# Patient Record
Sex: FEMALE | Race: BLACK OR AFRICAN AMERICAN
[De-identification: names, ages, dates, MRNs, and addresses within clinical notes are randomized per-mention and may not be internally consistent; named-entity substitution may affect disease eponyms.]

---

## 2017-11-11 NOTE — ER DOCUMENT REPORT
HPI





- HPI


Patient complains to provider of: foot ulcer


Onset: Other - Chronic, worse over 2 days


Onset/Duration: Worse


Quality of pain: Achy


Pain Level: 2


Context: 





Patient states that she has a chronic wound to her left foot that started as a 

blister one year ago and then developed into a callus.  Patient states she cut 

the callus off 2 days ago after he started to lift up.  Patient states she has 

noticed malodorous drainage from her foot.  Patient denies any fever or 

significant foot tenderness.  Patient does have a history of high blood 

pressure diabetes although has not been on any medication for the past 3 months 

as she had temporarily lost her insurance.  Patient does have insurance now and 

has an appointment with her primary doctor in 2 weeks for recheck.


Associated Symptoms: Other - Foot ulcer.  denies: Fever


Exacerbated by: Denies


Relieved by: Denies


Similar symptoms previously: Yes


Recently seen / treated by doctor: No





- ROS


ROS below otherwise negative: Yes


Systems Reviewed and Negative: Yes All other systems reviewed and negative





- CONSTITUTIONAL


Constitutional: DENIES: Fever, Chills





- NEURO


Neurology: DENIES: Headache





- CARDIOVASCULAR


Cardiovascular: DENIES: Chest pain





- RESPIRATORY


Respiratory: DENIES: Trouble Breathing, Coughing





- GASTROINTESTINAL


Gastrointestinal: DENIES: Nausea





- REPRODUCTIVE


Reproductive: DENIES: Pregnant:





- MUSCULOSKELETAL


Musculoskeletal: DENIES: Swelling





- DERM


Skin Color: Normal


Skin Problems: Ulcer





Past Medical History





- General


Information source: Patient





- Social History


Smoking Status: Current Every Day Smoker


Smoking Education Provided: Yes


Drug Abuse: None


Occupation: Assembly


Lives with: Family


Family History: Reviewed & Not Pertinent, Hypertension


Patient has suicidal ideation: No


Patient has homicidal ideation: No





- Past Medical History


Cardiac Medical History: Reports: Hx Hypertension


Endocrine Medical History: Reports: Hx Diabetes Mellitus Type 2


Renal/ Medical History: Denies: Hx Peritoneal Dialysis


Surgical Hx: Negative





- Immunizations


Hx Diphtheria, Pertussis, Tetanus Vaccination: No





Vertical Provider Document





- CONSTITUTIONAL


Agree With Documented VS: Yes


Exam Limitations: No Limitations


General Appearance: WD/WN, No Apparent Distress, Obese





- INFECTION CONTROL


TRAVEL OUTSIDE OF THE [place] IN LAST 30 DAYS: No





- HEENT


HEENT: Atraumatic, Normocephalic





- NECK


Neck: Normal Inspection, Supple





- RESPIRATORY


Respiratory: Breath Sounds Normal, No Respiratory Distress


O2 Sat by Pulse Oximetry: 99





- CARDIOVASCULAR


Cardiovascular: Regular Rate, Regular Rhythm


Pulses: Normal: Dorsalis pedis





- BACK


Back: Normal Inspection





- MUSCULOSKELETAL/EXTREMETIES


Musculoskeletal/Extremeties: SARAH, FROM





- NEURO


Level of Consciousness: Awake, Alert, Appropriate


Motor/Sensory: No Motor Deficit





- DERM


Integumentary: Warm, Dry.  negative: Abscess


Notes: 





Patient with chronic foot ulcer to plantar surface of left foot.  Wound 

nontender.  No surrounding erythema.  No overt odor appreciated.  Wound with a 

sloughy yellow-brown appearance





Course





- Re-evaluation


Re-evalutation: 





11/11/17 04:15


Patient states that she has previously taken glipizide to manage her diabetes 

but ran out a few months ago.  Patient is uncertain of the blood pressure 

medication but does suspect that she has taken lisinopril to treat her blood 

pressure.





Discussed results of patient's diagnostic tests with her.  Patient encouraged 

to follow-up with primary doctor for recheck of her foot wound as well as her 

high blood pressure and diabetes management.  Patient encouraged to follow-up 

with the wound clinic for further management of her chronic foot ulcer.  

Patient without any signs concerning for osteomyelitis or cellulitis at this 

time.  Will place patient on antibiotic to cover given her reported history of 

malodorous drainage.





- Vital Signs


Vital signs: 


 











Temp Pulse Resp BP Pulse Ox


 


 98.9 F   85   16   182/119 H  99 


 


 11/11/17 00:58  11/11/17 00:58  11/11/17 02:20  11/11/17 00:58  11/11/17 02:20














- Laboratory


Result Diagrams: 


 11/11/17 01:57





 11/11/17 01:57


Laboratory results interpreted by me: 





11/11/17 06:02


 Labs- Entire Visit











  11/11/17 11/11/17





  01:57 01:57


 


WBC  5.9 


 


RBC  5.21 


 


Hgb  14.1 


 


Hct  41.9 


 


MCV  80 


 


MCH  26.9 L 


 


MCHC  33.5 


 


RDW  14.3 H 


 


Plt Count  198 


 


Seg Neutrophils %  46.8 


 


Lymphocytes %  45.3 H 


 


Monocytes %  5.7 


 


Eosinophils %  1.7 


 


Basophils %  0.5 


 


Absolute Neutrophils  2.7 


 


Absolute Lymphocytes  2.7 


 


Absolute Monocytes  0.3 


 


Absolute Eosinophils  0.1 


 


Absolute Basophils  0.0 


 


Sodium   139.8


 


Potassium   3.6


 


Chloride   102


 


Carbon Dioxide   25


 


Anion Gap   13


 


BUN   14


 


Creatinine   0.57


 


Est GFR ( Amer)   > 60


 


Est GFR (Non-Af Amer)   > 60


 


Glucose   251 H


 


Calcium   8.9


 


Total Bilirubin   0.6


 


Direct Bilirubin   0.3


 


Indirect Bilirubin   Not Reportable


 


Neonat Total Bilirubin   Not Reportable


 


AST   29


 


ALT   20


 


Alkaline Phosphatase   79


 


Total Protein   7.7


 


Albumin   3.8














- Diagnostic Test


Radiology reviewed: Reports reviewed





Discharge





- Discharge


Clinical Impression: 


 Hx of essential hypertension





Diabetic foot ulcer


Qualifiers:


 Diabetic foot ulcer location: unspecified part of foot Diabetes mellitus type: 

type 2 Laterality: left Non-pressure ulcer stage: unspecified non-pressure 

ulcer stage Qualified Code(s): E11.621 - Type 2 diabetes mellitus with foot 

ulcer





Condition: Stable


Disposition: HOME, SELF-CARE


Instructions:  Diabetes ([facility]), Foot or Leg Ulcer ([facility]), High Blood Pressure, 

Requiring Treatment ([facility])


Additional Instructions: 


Return immediately for any new or worsening symptoms





Followup with your primary care provider, call tomorrow to make a followup 

appointment





Follow-up with the wound clinic for further management of diabetic foot ulcer





Keep a log of your blood pressure as well as your blood sugar readings to take 

to your appointment with your primary doctor in 2 weeks.





Monitor your blood sugar daily


Prescriptions: 


Ciprofloxacin HCl [Cipro 500 mg Tablet] 500 mg PO BID #14 tablet


Glipizide [Glipizide ER] 2.5 mg PO DAILY #15 tab.er.[id number]


Lisinopril 10 mg PO DAILY #15 tablet


Forms:  Elevated Blood Pressure


Referrals: 


ERIKA CHILD MD [Primary Care Provider] - Follow up in 3-5 days


[facility] [Provider Group] - 11/13/17

## 2017-11-11 NOTE — RADIOLOGY REPORT (SQ)
EXAM DESCRIPTION:  FOOT LEFT COMPLETE



COMPLETED DATE/TIME:  11/11/2017 3:07 am



REASON FOR STUDY:  foot wound, hx DM



COMPARISON:  None.



NUMBER OF VIEWS:  Three views.



TECHNIQUE:  AP, lateral and oblique  radiographic images acquired of the left foot.



LIMITATIONS:  None.



FINDINGS:  MINERALIZATION: Normal.

BONES: No acute fracture or dislocation.  No worrisome bone lesions.  Small calcaneal enthesophytes. 
 Minimal osteophyte of the navicular at the talonavicular joint.

JOINTS: No effusions.

SOFT TISSUES: No soft tissue swelling.  No foreign body.

OTHER: No other significant finding.



IMPRESSION:  No acute findings.



TECHNICAL DOCUMENTATION:  JOB ID:  [id number]

 2011 [employer]- All Rights Reserved

## 2019-02-27 ENCOUNTER — HOSPITAL ENCOUNTER (EMERGENCY)
Dept: HOSPITAL 62 - ER | Age: 34
Discharge: HOME | End: 2019-02-27
Payer: COMMERCIAL

## 2019-02-27 VITALS — DIASTOLIC BLOOD PRESSURE: 105 MMHG | SYSTOLIC BLOOD PRESSURE: 156 MMHG

## 2019-02-27 DIAGNOSIS — O16.1: ICD-10-CM

## 2019-02-27 DIAGNOSIS — E11.9: ICD-10-CM

## 2019-02-27 DIAGNOSIS — O99.281: Primary | ICD-10-CM

## 2019-02-27 DIAGNOSIS — R42: ICD-10-CM

## 2019-02-27 DIAGNOSIS — Z79.84: ICD-10-CM

## 2019-02-27 DIAGNOSIS — E86.0: ICD-10-CM

## 2019-02-27 DIAGNOSIS — O24.111: ICD-10-CM

## 2019-02-27 DIAGNOSIS — R11.0: ICD-10-CM

## 2019-02-27 DIAGNOSIS — Z3A.01: ICD-10-CM

## 2019-02-27 DIAGNOSIS — O26.891: ICD-10-CM

## 2019-02-27 LAB
ADD MANUAL DIFF: NO
ALBUMIN SERPL-MCNC: 3.9 G/DL (ref 3.5–5)
ALP SERPL-CCNC: 65 U/L (ref 38–126)
ALT SERPL-CCNC: 16 U/L (ref 9–52)
ANION GAP SERPL CALC-SCNC: 11 MMOL/L (ref 5–19)
APPEARANCE UR: CLEAR
APTT PPP: YELLOW S
AST SERPL-CCNC: 13 U/L (ref 14–36)
BASOPHILS # BLD AUTO: 0.1 10^3/UL (ref 0–0.2)
BASOPHILS NFR BLD AUTO: 1 % (ref 0–2)
BILIRUB DIRECT SERPL-MCNC: 0.2 MG/DL (ref 0–0.4)
BILIRUB SERPL-MCNC: 0.6 MG/DL (ref 0.2–1.3)
BILIRUB UR QL STRIP: NEGATIVE
BUN SERPL-MCNC: 9 MG/DL (ref 7–20)
CALCIUM: 9.5 MG/DL (ref 8.4–10.2)
CHLORIDE SERPL-SCNC: 100 MMOL/L (ref 98–107)
CO2 SERPL-SCNC: 23 MMOL/L (ref 22–30)
EOSINOPHIL # BLD AUTO: 0.1 10^3/UL (ref 0–0.6)
EOSINOPHIL NFR BLD AUTO: 1.9 % (ref 0–6)
ERYTHROCYTE [DISTWIDTH] IN BLOOD BY AUTOMATED COUNT: 16.1 % (ref 11.5–14)
GLUCOSE SERPL-MCNC: 256 MG/DL (ref 75–110)
GLUCOSE UR STRIP-MCNC: >=500 MG/DL
HCT VFR BLD CALC: 38.6 % (ref 36–47)
HGB BLD-MCNC: 13.2 G/DL (ref 12–15.5)
KETONES UR STRIP-MCNC: (no result) MG/DL
LYMPHOCYTES # BLD AUTO: 1.8 10^3/UL (ref 0.5–4.7)
LYMPHOCYTES NFR BLD AUTO: 34.6 % (ref 13–45)
MCH RBC QN AUTO: 26.3 PG (ref 27–33.4)
MCHC RBC AUTO-ENTMCNC: 34.2 G/DL (ref 32–36)
MCV RBC AUTO: 77 FL (ref 80–97)
MONOCYTES # BLD AUTO: 0.4 10^3/UL (ref 0.1–1.4)
MONOCYTES NFR BLD AUTO: 8.3 % (ref 3–13)
NEUTROPHILS # BLD AUTO: 2.9 10^3/UL (ref 1.7–8.2)
NEUTS SEG NFR BLD AUTO: 54.2 % (ref 42–78)
NITRITE UR QL STRIP: NEGATIVE
PH UR STRIP: 6 [PH] (ref 5–9)
PLATELET # BLD: 234 10^3/UL (ref 150–450)
POTASSIUM SERPL-SCNC: 4.5 MMOL/L (ref 3.6–5)
PROT SERPL-MCNC: 7.3 G/DL (ref 6.3–8.2)
PROT UR STRIP-MCNC: NEGATIVE MG/DL
RBC # BLD AUTO: 5.02 10^6/UL (ref 3.72–5.28)
SODIUM SERPL-SCNC: 134.3 MMOL/L (ref 137–145)
SP GR UR STRIP: 1.04
TOTAL CELLS COUNTED % (AUTO): 100 %
UROBILINOGEN UR-MCNC: NEGATIVE MG/DL (ref ?–2)
WBC # BLD AUTO: 5.3 10^3/UL (ref 4–10.5)

## 2019-02-27 PROCEDURE — 96361 HYDRATE IV INFUSION ADD-ON: CPT

## 2019-02-27 PROCEDURE — 96360 HYDRATION IV INFUSION INIT: CPT

## 2019-02-27 PROCEDURE — 81025 URINE PREGNANCY TEST: CPT

## 2019-02-27 PROCEDURE — 99284 EMERGENCY DEPT VISIT MOD MDM: CPT

## 2019-02-27 PROCEDURE — 36415 COLL VENOUS BLD VENIPUNCTURE: CPT

## 2019-02-27 PROCEDURE — 85025 COMPLETE CBC W/AUTO DIFF WBC: CPT

## 2019-02-27 PROCEDURE — 81001 URINALYSIS AUTO W/SCOPE: CPT

## 2019-02-27 PROCEDURE — 87086 URINE CULTURE/COLONY COUNT: CPT

## 2019-02-27 PROCEDURE — 82962 GLUCOSE BLOOD TEST: CPT

## 2019-02-27 PROCEDURE — 80053 COMPREHEN METABOLIC PANEL: CPT

## 2019-02-27 NOTE — ER DOCUMENT REPORT
ED General





- General


Chief Complaint: Dizziness


Stated Complaint: DIZZY, NAUSEA, VISION ISSUE


Time Seen by Provider: 02/27/19 08:19


Primary Care Provider: 


ERIKA CHILD MD [Primary Care Provider] - Follow up as needed


Notes: 





Patient is a 33-year-old female presents to the emergency department for 

generalized nausea, dizziness vision loss morning.  Patient states she feels as 

though she was standing for an extended period of time when she got really 

nauseous, dizzy, lightheaded broke out in a sweat.  Patient states she feels as 

though she was seeing spots and potentially had tunnel vision.  States she sat 

down and overall felt a lot better.  Patient states she was sitting in a dark 

room at work and feels as though she may have fell asleep and felt a whole lot 

better.  Patient states when she stood up again to go back to work she continued

to feel nauseous so her job told her to come to the emergency room.  Patient 

states currently while lying flat in the hospital bed she feels a whole lot 

better.  She states she has a slight amount of nausea but is denying any 

dizziness, lightheadedness, headache, change in vision, chest pain, abdominal 

pain.  Patient is denying any actual syncopal episode, she is denying hitting 

her head, neck, back or pain in any.





Past medical history: Diabetes, hypertension


Medications: Clonidine, glipizide Janumet


Allergies: None


Last menstrual period 1/15/2019


TRAVEL OUTSIDE OF THE [place] IN LAST 30 DAYS: No





- Related Data


Allergies/Adverse Reactions: 


                                        





No Known Allergies Allergy (Verified 02/27/19 07:26)


   











Past Medical History





- General


Information source: Patient





- Social History


Smoking Status: Unknown if Ever Smoked


Family History: Reviewed & Not Pertinent, Hypertension


Patient has suicidal ideation: No


Patient has homicidal ideation: No





- Past Medical History


Cardiac Medical History: Reports: Hx Hypertension


Endocrine Medical History: Reports: Hx Diabetes Mellitus Type 2


Renal/ Medical History: Denies: Hx Peritoneal Dialysis





- Immunizations


Hx Diphtheria, Pertussis, Tetanus Vaccination: No





Review of Systems





- Review of Systems


Constitutional: See HPI


EENT: See HPI


Cardiovascular: See HPI


Respiratory: No symptoms reported


Gastrointestinal: See HPI


Genitourinary: No symptoms reported


Female Genitourinary: See HPI


Musculoskeletal: No symptoms reported


Skin: No symptoms reported


Hematologic/Lymphatic: No symptoms reported


Neurological/Psychological: See HPI





Physical Exam





- Vital signs


Vitals: 


                                        











Temp Pulse Resp BP Pulse Ox


 


 98.6 F   79   18   154/99 H  100 


 


 02/27/19 07:31  02/27/19 07:31  02/27/19 07:31  02/27/19 07:31  02/27/19 07:31














- Notes


Notes: 





GENERAL: Alert, interacts well. No acute distress.


HEAD: Normocephalic, atraumatic.


EYES: Pupils equal, round, and reactive to light. Extraocular movements intact.


ENT: Oral mucosa moist, tongue midline. 


NECK: Full range of motion. Supple. Trachea midline.


LUNGS: Clear to auscultation bilaterally, no wheezes, rales, or rhonchi. No 

respiratory distress.


HEART: Regular rate and rhythm. No murmur


ABDOMEN: Soft, non-tender. Non-distended. Bowel sounds present in all 4 

quadrants.


EXTREMITIES: Moves all 4 extremities spontaneously. No edema, normal radial and 

dorsalis pedis pulses bilaterally. No cyanosis.


BACK: no cervical, thoracic, lumbar midline tenderness. No saddle anesthesia, 

normal distal neurovascular exam. 


NEUROLOGICAL: Alert and oriented x3. Normal speech. cranial nerves II through 

XII grossly intact 


PSYCH: Normal affect, normal mood.


SKIN: Warm, dry, normal turgor. No rashes or lesions noted.








Course





- Re-evaluation


Re-evalutation: 





02/27/19 10:23


Patient's labs reveal no signs of leukocytosis, no signs of anemia.  Patient's 

sodium is 134.3, treated with normal saline solution in the emergency 

department.  Patient's initial blood sugar glucose is 256 with positive ketones 

noted on her urine.  Patient's specific gravity was also elevated at 1.043.  

Treated with 2 total liters of fluid resuscitation in the emergency room.  

Patient does have a positive hCG.  Patient also had positive orthostatics noted 

with a heart rate at 66 while lying flat and went up to 92 while standing.  

Again patient fluid resuscitated in the emergency department and overall feels a

lot better.


02/27/19 12:47


After fluid resuscitation in the emergency department patient states she overall

feels a lot better.  Patient's blood sugar glucose is down to 172.  Discussed 

close follow-up with primary care provider to inevitably get in with an OB/GYN. 

Discussed continued care of her diabetes closely and need to discuss continued 

medications for her hypertension now due to her being pregnant.  Patient voices 

understanding and states she will call her primary care provider today.  Close 

return precautions discussed.





- Vital Signs


Vital signs: 


                                        











Temp Pulse Resp BP Pulse Ox


 


 98.6 F   66   20   137/79 H  100 


 


 02/27/19 07:31  02/27/19 10:14  02/27/19 10:13  02/27/19 10:14  02/27/19 10:13














- Laboratory


Result Diagrams: 


                                 02/27/19 08:35





                                 02/27/19 08:35


Laboratory results interpreted by me: 


                                        











  02/27/19 02/27/19 02/27/19





  08:35 08:35 09:03


 


MCV  77 L  


 


MCH  26.3 L  


 


RDW  16.1 H  


 


Sodium   134.3 L 


 


Glucose   256 H 


 


POC Glucose   


 


AST   13 L 


 


Urine Glucose (UA)    >=500 H


 


Urine Ketones    TRACE H


 


Urine HCG, Qual    POSITIVE H














  02/27/19





  12:05


 


MCV 


 


MCH 


 


RDW 


 


Sodium 


 


Glucose 


 


POC Glucose  172 H


 


AST 


 


Urine Glucose (UA) 


 


Urine Ketones 


 


Urine HCG, Qual 














Discharge





- Discharge


Clinical Impression: 


 Dehydration





Pregnancy


Qualifiers:


 Weeks of gestation: less than 8 weeks Qualified Code(s): Z3A.01 - Less than 8 

weeks gestation of pregnancy





Condition: Stable


Disposition: HOME, SELF-CARE


Instructions:  Dehydration ([facility]), Pregnancy ([facility])


Additional Instructions: 


As we discussed today your labs reveal signs of dehydration.  You are also 

pregnant.  It is unsure of exactly how far along you are C need to follow-up 

with your primary care provider and inevitably OB/GYN.  Also as we discussed you

need to call your primary care provider today in order to make an appointment in

a rather emergent fashion due to the medications that you are on and them not 

being safe in pregnancy.  Please make sure if you have any other concerns to 

return to the emergency room.


Referrals: 


ERIKA CHILD MD [Primary Care Provider] - Follow up as needed

## 2019-03-05 ENCOUNTER — HOSPITAL ENCOUNTER (OUTPATIENT)
Dept: HOSPITAL 62 - ER | Age: 34
Setting detail: OBSERVATION
LOS: 2 days | Discharge: HOME | End: 2019-03-07
Attending: INTERNAL MEDICINE | Admitting: INTERNAL MEDICINE
Payer: COMMERCIAL

## 2019-03-05 DIAGNOSIS — Z82.49: ICD-10-CM

## 2019-03-05 DIAGNOSIS — Z79.899: ICD-10-CM

## 2019-03-05 DIAGNOSIS — O26.891: ICD-10-CM

## 2019-03-05 DIAGNOSIS — O99.211: ICD-10-CM

## 2019-03-05 DIAGNOSIS — E11.65: ICD-10-CM

## 2019-03-05 DIAGNOSIS — Z3A.01: ICD-10-CM

## 2019-03-05 DIAGNOSIS — E87.1: ICD-10-CM

## 2019-03-05 DIAGNOSIS — O16.1: ICD-10-CM

## 2019-03-05 DIAGNOSIS — E66.01: ICD-10-CM

## 2019-03-05 DIAGNOSIS — Z79.84: ICD-10-CM

## 2019-03-05 DIAGNOSIS — O24.111: Primary | ICD-10-CM

## 2019-03-05 LAB
ADD MANUAL DIFF: NO
ALBUMIN SERPL-MCNC: 4.2 G/DL (ref 3.5–5)
ALP SERPL-CCNC: 71 U/L (ref 38–126)
ALT SERPL-CCNC: 24 U/L (ref 9–52)
ANION GAP SERPL CALC-SCNC: 11 MMOL/L (ref 5–19)
APPEARANCE UR: CLEAR
APTT PPP: YELLOW S
AST SERPL-CCNC: 14 U/L (ref 14–36)
BASOPHILS # BLD AUTO: 0.1 10^3/UL (ref 0–0.2)
BASOPHILS NFR BLD AUTO: 0.8 % (ref 0–2)
BILIRUB DIRECT SERPL-MCNC: 0.2 MG/DL (ref 0–0.4)
BILIRUB SERPL-MCNC: 0.6 MG/DL (ref 0.2–1.3)
BILIRUB UR QL STRIP: NEGATIVE
BUN SERPL-MCNC: 11 MG/DL (ref 7–20)
CALCIUM: 9.4 MG/DL (ref 8.4–10.2)
CHLORIDE SERPL-SCNC: 97 MMOL/L (ref 98–107)
CO2 SERPL-SCNC: 24 MMOL/L (ref 22–30)
EOSINOPHIL # BLD AUTO: 0.1 10^3/UL (ref 0–0.6)
EOSINOPHIL NFR BLD AUTO: 1.2 % (ref 0–6)
ERYTHROCYTE [DISTWIDTH] IN BLOOD BY AUTOMATED COUNT: 16 % (ref 11.5–14)
GLUCOSE SERPL-MCNC: 271 MG/DL (ref 75–110)
GLUCOSE UR STRIP-MCNC: >=500 MG/DL
HCT VFR BLD CALC: 38.3 % (ref 36–47)
HGB BLD-MCNC: 12.9 G/DL (ref 12–15.5)
KETONES UR STRIP-MCNC: NEGATIVE MG/DL
LYMPHOCYTES # BLD AUTO: 2.6 10^3/UL (ref 0.5–4.7)
LYMPHOCYTES NFR BLD AUTO: 31.7 % (ref 13–45)
MCH RBC QN AUTO: 25.9 PG (ref 27–33.4)
MCHC RBC AUTO-ENTMCNC: 33.6 G/DL (ref 32–36)
MCV RBC AUTO: 77 FL (ref 80–97)
MONOCYTES # BLD AUTO: 0.7 10^3/UL (ref 0.1–1.4)
MONOCYTES NFR BLD AUTO: 8.2 % (ref 3–13)
NEUTROPHILS # BLD AUTO: 4.7 10^3/UL (ref 1.7–8.2)
NEUTS SEG NFR BLD AUTO: 58.1 % (ref 42–78)
NITRITE UR QL STRIP: NEGATIVE
PH UR STRIP: 6 [PH] (ref 5–9)
PLATELET # BLD: 255 10^3/UL (ref 150–450)
POTASSIUM SERPL-SCNC: 4.2 MMOL/L (ref 3.6–5)
PROT SERPL-MCNC: 7.6 G/DL (ref 6.3–8.2)
PROT UR STRIP-MCNC: NEGATIVE MG/DL
RBC # BLD AUTO: 4.96 10^6/UL (ref 3.72–5.28)
SODIUM SERPL-SCNC: 131.5 MMOL/L (ref 137–145)
SP GR UR STRIP: 1.03
TOTAL CELLS COUNTED % (AUTO): 100 %
UROBILINOGEN UR-MCNC: NEGATIVE MG/DL (ref ?–2)
WBC # BLD AUTO: 8.2 10^3/UL (ref 4–10.5)

## 2019-03-05 PROCEDURE — 81025 URINE PREGNANCY TEST: CPT

## 2019-03-05 PROCEDURE — 80048 BASIC METABOLIC PNL TOTAL CA: CPT

## 2019-03-05 PROCEDURE — 36415 COLL VENOUS BLD VENIPUNCTURE: CPT

## 2019-03-05 PROCEDURE — 85025 COMPLETE CBC W/AUTO DIFF WBC: CPT

## 2019-03-05 PROCEDURE — 76801 OB US < 14 WKS SINGLE FETUS: CPT

## 2019-03-05 PROCEDURE — 84460 ALANINE AMINO (ALT) (SGPT): CPT

## 2019-03-05 PROCEDURE — 80053 COMPREHEN METABOLIC PANEL: CPT

## 2019-03-05 PROCEDURE — 82962 GLUCOSE BLOOD TEST: CPT

## 2019-03-05 PROCEDURE — 96360 HYDRATION IV INFUSION INIT: CPT

## 2019-03-05 PROCEDURE — 96361 HYDRATE IV INFUSION ADD-ON: CPT

## 2019-03-05 PROCEDURE — 84550 ASSAY OF BLOOD/URIC ACID: CPT

## 2019-03-05 PROCEDURE — 84443 ASSAY THYROID STIM HORMONE: CPT

## 2019-03-05 PROCEDURE — 99284 EMERGENCY DEPT VISIT MOD MDM: CPT

## 2019-03-05 PROCEDURE — 84450 TRANSFERASE (AST) (SGOT): CPT

## 2019-03-05 PROCEDURE — 81001 URINALYSIS AUTO W/SCOPE: CPT

## 2019-03-05 PROCEDURE — G0378 HOSPITAL OBSERVATION PER HR: HCPCS

## 2019-03-05 PROCEDURE — 84439 ASSAY OF FREE THYROXINE: CPT

## 2019-03-05 PROCEDURE — 83036 HEMOGLOBIN GLYCOSYLATED A1C: CPT

## 2019-03-05 NOTE — ER DOCUMENT REPORT
ED Medical Screen (RME)





- General


Chief Complaint: High Blood Sugar


Stated Complaint: BLOOD SUGAR ISSUE


Time Seen by Provider: 19 21:52


Primary Care Provider: 


ERIKA CHILD MD [Primary Care Provider] - Follow up as needed


Notes: 





Patient is a 33-year-old female recently told she was pregnant,  believe 

she is 7 weeks pregnant presents to the emergency department for an elevation in

her blood sugar.  Patient states she reported to this facility recently for 

generalized nausea and vomiting.  States she was told she was pregnant and 

followed up with her primary care.  Primary care changed her medications to 

Metformin and labetalol for her diabetes and hypertension.  Patient states they 

tried to place her on Humalog and then inevitably NovoLog but patient states 

that the medication was over $600 so she was on to get it.  Patient states she 

took her blood sugar today and it was over 300 which is why she presents to the 

emergency room.  Patient states she did vomit once today but states she feels as

though that may be associated with her morning sickness.





Past medical history: Diabetes, hypertension


Medications: Metformin, labetalol


Allergies: None





GENERAL: Alert, interacts well. No acute distress.


HEAD: Normocephalic, atraumatic.


ABDOMEN: Soft, non-tender. Non-distended. Bowel sounds present in all 4 

quadrants.


EXTREMITIES: Moves all 4 extremities spontaneously. No edema, normal radial and 

dorsalis pedis pulses bilaterally. No cyanosis.


SKIN: Warm, dry, normal turgor. No rashes or lesions noted.








I have greeted and performed a rapid initial assessment of this patient.  A 

comprehensive ED assessment and evaluation of the patient, analysis of test 

results and completion of the medical decision making process will be conducted 

by additional ED providers.


TRAVEL OUTSIDE OF THE [place] IN LAST 30 DAYS: No





- Related Data


Allergies/Adverse Reactions: 


                                        





No Known Allergies Allergy (Verified 19 07:26)


   











Past Medical History





- Past Medical History


Cardiac Medical History: Reports: Hx Hypertension


Endocrine Medical History: Reports: Hx Diabetes Mellitus Type 2


Renal/ Medical History: Denies: Hx Peritoneal Dialysis





- Immunizations


Hx Diphtheria, Pertussis, Tetanus Vaccination: No





Physical Exam





- Vital signs


Vitals: 





                                        











Temp Pulse Resp BP Pulse Ox


 


 99.5 F   77   18   190/110 H  100 


 


 19 20:05  19 20:05  19 20:05  19 20:05  19 20:05














Course





- Vital Signs


Vital signs: 





                                        











Temp Pulse Resp BP Pulse Ox


 


 99.5 F   77   18   190/110 H  100 


 


 19 20:05  19 20:05  19 20:05  19 20:05  19 20:05














Doctor's Discharge





- Discharge


Referrals: 


ERIKA CHILD MD [Primary Care Provider] - Follow up as needed

## 2019-03-06 LAB
ALT SERPL-CCNC: 19 U/L (ref 9–52)
ANION GAP SERPL CALC-SCNC: 10 MMOL/L (ref 5–19)
AST SERPL-CCNC: 12 U/L (ref 14–36)
BUN SERPL-MCNC: 9 MG/DL (ref 7–20)
CALCIUM: 9.6 MG/DL (ref 8.4–10.2)
CHLORIDE SERPL-SCNC: 101 MMOL/L (ref 98–107)
CO2 SERPL-SCNC: 24 MMOL/L (ref 22–30)
FREE T4 (FREE THYROXINE): 1.72 NG/DL (ref 0.78–2.19)
GLUCOSE SERPL-MCNC: 232 MG/DL (ref 75–110)
POTASSIUM SERPL-SCNC: 4.4 MMOL/L (ref 3.6–5)
SODIUM SERPL-SCNC: 135.4 MMOL/L (ref 137–145)
TSH SERPL-ACNC: 0.17 UIU/ML (ref 0.47–4.68)
URATE SERPL-MCNC: 2.7 MG/DL (ref 2.5–6.2)

## 2019-03-06 RX ADMIN — INSULIN LISPRO SCH UNIT: 100 INJECTION, SOLUTION INTRAVENOUS; SUBCUTANEOUS at 08:23

## 2019-03-06 RX ADMIN — METFORMIN HYDROCHLORIDE SCH: 500 TABLET, FILM COATED ORAL at 18:10

## 2019-03-06 RX ADMIN — LABETALOL HYDROCHLORIDE SCH MG: 200 TABLET, FILM COATED ORAL at 22:07

## 2019-03-06 RX ADMIN — INSULIN LISPRO SCH UNIT: 100 INJECTION, SOLUTION INTRAVENOUS; SUBCUTANEOUS at 18:06

## 2019-03-06 RX ADMIN — METFORMIN HYDROCHLORIDE SCH MG: 500 TABLET, FILM COATED ORAL at 10:01

## 2019-03-06 RX ADMIN — INSULIN LISPRO SCH UNIT: 100 INJECTION, SOLUTION INTRAVENOUS; SUBCUTANEOUS at 12:10

## 2019-03-06 NOTE — RADIOLOGY REPORT (SQ)
EXAM DESCRIPTION:  U/S [id number] TRNABD 1GES W/ODOP



COMPLETED DATE/TIME:  3/6/2019 12:10 pm



REASON FOR STUDY:  pt with pos preg test believed to be 7w per LMP



COMPARISON:  None.



TECHNIQUE:  Transabdominal static and realtime grayscale images acquired of the pelvis. Additional se
lected spectral and color Doppler images recorded. All images stored on PACs.

bHCG: None available

CLINICAL DATES:  1/15/2019



LIMITATIONS:  None.



FINDINGS:  FETUS:  Single Living intrauterine pregnancy.

ULTRASOUND EGA: 7 weeks 0 days

ULTRASOUND JONNA: 10/23/2019

EFW: Not applicable less than 20 weeks.

CRL:  9.8 mm

FHR: 157  beats per minute.

FETAL SURVEY: Too early to assess

AMNIOTIC FLUID: Adequate amount.

PLACENTA: Not yet developed due to early gestation.

SUBCHORIONIC BLEED: No

SIZE OF BLEED: Not applicable.

UTERUS: No masses. No anomalies.  Uterus is 11 x 6 x 5 cm in size

CERVICAL LENGTH: Closed, 2.4 cm in length

RIGHT ADNEXA: Not visualized due to adnexal bowel gas.

LEFT ADNEXA: Not visualized due to adnexal bowel gas

FREE FLUID: None.

OTHER: No other significant finding.



IMPRESSION:  LIVING INTRAUTERINE PREGNANCY.

EGA 7 weeks 0 days

Trimester of pregnancy:  First - 0 to 13 weeks.



TECHNICAL DOCUMENTATION:  JOB ID:  [id number]

 2011 [employer]- All Rights Reserved                            rev-5/18



Reading location - IP/workstation name: [email or web address]

## 2019-03-06 NOTE — PDOC CONSULTATION
Consultation


Consult Date: 19


Attending physician:: ERIKA CHILD


Consult reason:: HTN and Type II DM and newly pregnant





History of Present Illness


Admission Date/PCP: 


  19 00:12





  ERIKA CHILD





Patient complains of: hyperglycemia and poor control of sugars and BP


History of Present Illness: 


CHEPE SCHMITT is a 33 year old female  at 7+1ega by known LMP which is c/w 

US today.  JONNA 10/22/2019 by LMP.  She reports that she presented to ER for 

severely elevated BS at 300 on 3/5.  She reports that she was on Janumet and 

Clonidine and another pill prior to pregnancy.  She is unsure of dosage.  She 

reports that she was dx with DM and HTN approx 4 years ago.  Her Hb A1c per 

report at dx was approx 11.  HbA1c 8.7 today. She reports some morning sickness 

but o/w feels ok.  She denies any other medical issues at this time.  She was 

admitted by her PCM and PCM has consulted OB due to pregnancy with comorbidities

of HTN and DM








Past Medical History


LMP: 1/15/2019


Gynecological Infection: No


Cardiac Medical History: Reports: Hypertension


Endocrine Medical History: Reports: Diabetes Mellitus Type 2





Social History


Information Source: Patient


Lives with: Family


Smoking Status: Never Smoker


Frequency of Alcohol Use: None


Hx Recreational Drug Use: No


Drugs: None


Hx Prescription Drug Abuse: No





- Advance Directive


Resuscitation Status: Full Code





Family History


Family History: Reviewed & Not Pertinent, Hypertension


Parental Family History Reviewed: No


Children Family History Reviewed: NA


Sibling(s) Family History Reviewed.: NA





Medication/Allergy


Home Medications: 








Labetalol HCl 100 mg PO BID 19 


Metformin HCl 1,000 mg PO BID 19 








Allergies/Adverse Reactions: 


                                        





No Known Allergies Allergy (Verified 19 07:26)


   











Review of Systems


Constitutional: ABSENT: chills, fever(s), headache(s), weight gain, weight loss


Ears: ABSENT: hearing changes


Respiratory: ABSENT: cough, hemoptysis


Gastrointestinal: ABSENT: abdominal pain, constipation, diarrhea, hematemesis, 

hematochezia, nausea, vomiting


Genitourinary: ABSENT: dysuria, hematuria


Neurological: ABSENT: abnormal gait, abnormal speech, confusion, dizziness, 

focal weakness, syncope


Endocrine: ABSENT: cold intolerance, heat intolerance, polydipsia, polyuria


Hematologic/Lymphatic: ABSENT: easy bleeding, easy bruising





Physical Exam





- Physical Exam


Vital Signs: 


                                        











Temp Pulse Resp BP Pulse Ox


 


 98.6 F   71   18   154/95 H  100 


 


 19 16:02  19 16:02  19 16:02  19 16:02  19 16:02








                                 Intake & Output











 19





 06:59 06:59 06:59


 


Intake Total   


 


Balance   


 


Weight  149.232 kg 











General appearance: PRESENT: no acute distress, obese, well-developed, well-

nourished


Head exam: PRESENT: atraumatic, normocephalic


Neck exam: PRESENT: full ROM.  ABSENT: carotid bruit, JVD, lymphadenopathy, 

thyromegaly


Respiratory exam: PRESENT: clear to auscultation kim, symmetrical, unlabored


Cardiovascular exam: PRESENT: RRR.  ABSENT: diastolic murmur, rubs, systolic m

urmur


Pulses: PRESENT: normal dorsalis pedis pul, +2 pedal pulses bilateral


GI/Abdominal exam: PRESENT: normal bowel sounds, soft.  ABSENT: distended, 

guarding, mass, organolmegaly, rebound, tenderness


Rectal exam: PRESENT: deferred


Extremities exam: PRESENT: full ROM.  ABSENT: calf tenderness, clubbing, pedal 

edema


Neurological exam: PRESENT: alert, awake, oriented to person, oriented to place,

oriented to time, oriented to situation, CN II-XII grossly intact.  ABSENT: 

motor sensory deficit


Psychiatric exam: PRESENT: appropriate affect, normal mood.  ABSENT: homicidal 

ideation, suicidal ideation





Result


Laboratory Results: 


                                        





                                 19 22:12 





                                 19 09:06 





                                        











  19





  22:11 22:12 22:12


 


WBC   8.2 


 


RBC   4.96 


 


Hgb   12.9 


 


Hct   38.3 


 


MCV   77 L 


 


MCH   25.9 L 


 


MCHC   33.6 


 


RDW   16.0 H 


 


Plt Count   255 


 


Seg Neutrophils %   58.1 


 


Lymphocytes %   31.7 


 


Monocytes %   8.2 


 


Eosinophils %   1.2 


 


Basophils %   0.8 


 


Absolute Neutrophils   4.7 


 


Absolute Lymphocytes   2.6 


 


Absolute Monocytes   0.7 


 


Absolute Eosinophils   0.1 


 


Absolute Basophils   0.1 


 


Sodium    131.5 L


 


Potassium    4.2


 


Chloride    97 L


 


Carbon Dioxide    24


 


Anion Gap    11


 


BUN    11


 


Creatinine    0.58


 


Est GFR ( Amer)    > 60


 


Est GFR (Non-Af Amer)    > 60


 


Glucose    271 H


 


Calcium    9.4


 


Total Bilirubin    0.6


 


AST    14


 


ALT    24


 


Alkaline Phosphatase    71


 


Total Protein    7.6


 


Albumin    4.2


 


Urine Color  YELLOW  


 


Urine Appearance  CLEAR  


 


Urine pH  6.0  


 


Ur Specific Gravity  1.031  


 


Urine Protein  NEGATIVE  


 


Urine Glucose (UA)  >=500 H  


 


Urine Ketones  NEGATIVE  


 


Urine Blood  NEGATIVE  


 


Urine Nitrite  NEGATIVE  


 


Ur Leukocyte Esterase  NEGATIVE  


 


Urine WBC (Auto)  0  


 


Urine RBC (Auto)  0  














  19





  09:06


 


WBC 


 


RBC 


 


Hgb 


 


Hct 


 


MCV 


 


MCH 


 


MCHC 


 


RDW 


 


Plt Count 


 


Seg Neutrophils % 


 


Lymphocytes % 


 


Monocytes % 


 


Eosinophils % 


 


Basophils % 


 


Absolute Neutrophils 


 


Absolute Lymphocytes 


 


Absolute Monocytes 


 


Absolute Eosinophils 


 


Absolute Basophils 


 


Sodium  135.4 L


 


Potassium  4.4


 


Chloride  101


 


Carbon Dioxide  24


 


Anion Gap  10


 


BUN  9


 


Creatinine  0.64


 


Est GFR ( Amer)  > 60


 


Est GFR (Non-Af Amer)  > 60


 


Glucose  232 H


 


Calcium  9.6


 


Total Bilirubin 


 


AST 


 


ALT 


 


Alkaline Phosphatase 


 


Total Protein 


 


Albumin 


 


Urine Color 


 


Urine Appearance 


 


Urine pH 


 


Ur Specific Gravity 


 


Urine Protein 


 


Urine Glucose (UA) 


 


Urine Ketones 


 


Urine Blood 


 


Urine Nitrite 


 


Ur Leukocyte Esterase 


 


Urine WBC (Auto) 


 


Urine RBC (Auto) 











Impressions: 


                                        





Obstetrics Ultrasound  19 00:00


IMPRESSION:  LIVING INTRAUTERINE PREGNANCY.


EGA 7 weeks 0 days


Trimester of pregnancy:  First - 0 to 13 weeks.


 














Assessment & Plan





- Diagnosis


(1) Uncontrolled type 2 diabetes mellitus


Qualifiers: 


   Glycemic state: with hyperglycemia   Qualified Code(s): E11.65 - Type 2 

diabetes mellitus with hyperglycemia   


Is this a current diagnosis for this admission?: Yes   


Plan: 


Uncontrolled Type II DM


Prolonged discussion with patient regarding the risks to pregnancy with HbA1c of

8.7 - - including Miscarriage, IUFD, congenital fetal cardiac anomalies, 

labor,  delivery, PreE, NICU admission for baby etc.


Reviewed goals for DM in pregnancy are much more stringent than outside 

pregnancy and that intervention now to move toward those goals is important.


Metformin unfortunately is not achieving this goal was discontinued as Insulin 

is better to manage her accuchecks in pregnancy.


Dietician consult placed





Accucheck changed to fasting and 2hr pp


Will start insulin with targets as follows.


Accucheck Goals:


Fasting <90


2hr PP <120


(if needs to do 1 hr PP as outpatient then goal is <140)


Tighter SSI orders written.





SS consult for assistance with medicaid placed by [facility] - Thank you.





Insulin regimen starting dose based on weight and gestational age with 2/3 to 

1/3 rule (but this is just a starting dose and will need to be adjusted 

depending on her response.)  We are happy to manage this in hospital and 

outpatient.


0.3 units per kg (150Kg) - total 45 units of insulin per day in divided dosage


Per Calc:


NPH 20u QAM, 10u QHS with snack  - - adjusted to 15/10 to see how she responds 

then will increase from there.


Regular 10u ACbrkfast, 5u ACdinner 


May also need to add regular insulin coverage for lunch depending on values.


All insulin orders written.





Consults that will be needed for DM as outpatient: Ophtho for eye exam, 

Cardiology for Maternal ECHO and EKG, OB/GYN at [facility] for management of pregnancy 

(we will then place consult for MFM and Fetal ECHO).





Due to increased risks as above: needs TSH (ordered), Needs 24 hr UTP and P:C 

ratio (ordered), 











(2) Morbid (severe) obesity due to excess calories


Is this a current diagnosis for this admission?: Yes   


Plan: 


BMI 46 which puts patient at risk for PreE,  delivery, obstructed labor, 

fetal cardiac anomalies and peripartum thrombosis among many other things.


Recommendations are as above for already addressed DM.


Additionally will need SCDs and likely lovenox peripartum.


Will address these issues again in office. 








(3) Hypertension affecting pregnancy


Qualifiers: 


   Trimester: first trimester   Qualified Code(s): O16.1 - Unspecified maternal 

hypertension, first trimester   


Is this a current diagnosis for this admission?: Yes   


Plan: 


BPs still with very poor control.  Per patient reports she seems to have been on

several medications to manage her BPs.


24 hr UTP ordered.  Need to establish baseline renal function due to risk of 

PreE.


Will increase Labetolol to 200mg TID, may need a secondary agent as well.


Reviewed risks of HTN in pregnancy and postpartum.








- Time


Critical Time spent with patient: 25-34 minutes


Medications reviewed and adjusted accordingly: Yes


Anticipated discharge: Home


Within: within 48 hours





- Inpatient Certification


Based on my medical assessment, after consideration of the patient's comorbiditi

es, presenting symptoms, or acuity I expect that the services needed warrant 

INPATIENT care.: Yes


I certify that my determination is in accordance with my understanding of Med

icare's requirements for reasonable and necessary INPATIENT services [42 CFR 

412.3e].: Yes


Medical Necessity: Failure to Improve With Outpatient Therapy, Significant 

Comorbidiites Make Outpatient Treatment Too Risky, Need Close Monitoring Due to 

Risk of Patient Decompensation


Post Hospital Care: D/C Planner Documentation





- Plan Summary


Plan Summary: 


will try to continue to adjust meds and then continue management as an 

outpatient.

## 2019-03-06 NOTE — ER DOCUMENT REPORT
ED General





- General


Chief Complaint: High Blood Sugar


Stated Complaint: BLOOD SUGAR ISSUE


Time Seen by Provider: 19 21:52


Notes: 





Patient is a 33-year-old female recently told she was pregnant,  believe 

she is 7 weeks pregnant presents to the emergency department for an elevation in

her blood sugar.  Patient states she reported to this facility recently for 

generalized nausea and vomiting.  States she was told she was pregnant and 

followed up with her primary care.  Primary care changed her medications to 

Metformin and labetalol for her diabetes and hypertension.  Patient states they 

tried to place her on Humalog and then inevitably NovoLog but patient states 

that the medication was over $600 so she was on to get it.  Patient states she 

took her blood sugar today and it was over 300 which is why she presents to the 

emergency room.  Patient states she did vomit once today but states she feels as

though that may be associated with her morning sickness.





Past medical history: Diabetes, hypertension


Medications: Metformin, labetalol


Allergies: None


TRAVEL OUTSIDE OF THE [place] IN LAST 30 DAYS: No





- Related Data


Allergies/Adverse Reactions: 


                                        





No Known Allergies Allergy (Verified 19 07:26)


   











Past Medical History





- General


Information source: Patient





- Social History


Smoking Status: Never Smoker


Family History: Reviewed & Not Pertinent, Hypertension


Patient has suicidal ideation: No


Patient has homicidal ideation: No





- Past Medical History


Cardiac Medical History: Reports: Hx Hypertension


Endocrine Medical History: Reports: Hx Diabetes Mellitus Type 2


Renal/ Medical History: Denies: Hx Peritoneal Dialysis





- Immunizations


Hx Diphtheria, Pertussis, Tetanus Vaccination: No





Review of Systems





- Review of Systems


Constitutional: No symptoms reported


EENT: No symptoms reported


Cardiovascular: No symptoms reported.  denies: Chest pain, Dyspnea


Respiratory: No symptoms reported.  denies: Short of breath


Gastrointestinal: See HPI


Genitourinary: No symptoms reported


Female Genitourinary: No symptoms reported


Musculoskeletal: No symptoms reported


Skin: No symptoms reported


Hematologic/Lymphatic: No symptoms reported


Neurological/Psychological: No symptoms reported.  denies: Headaches





Physical Exam





- Vital signs


Vitals: 


                                        











Temp Pulse Resp BP Pulse Ox


 


 99.5 F   77   18   190/110 H  100 


 


 19 20:05  19 20:05  19 20:05  19 20:05  19 20:05














- Notes


Notes: 





GENERAL: Alert, interacts well. No acute distress.


HEAD: Normocephalic, atraumatic.


EYES: Pupils equal, round, and reactive to light. Extraocular movements intact.


ENT: Oral mucosa moist, tongue midline. 


NECK: Full range of motion. Supple. Trachea midline.


LUNGS: Clear to auscultation bilaterally, no wheezes, rales, or rhonchi. No 

respiratory distress.


HEART: Regular rate and rhythm. No murmur


ABDOMEN: Obese soft, non-tender. Non-distended. Bowel sounds present in all 4 

quadrants.


EXTREMITIES: Moves all 4 extremities spontaneously. No edema, normal radial and 

dorsalis pedis pulses bilaterally. No cyanosis.  5 out of 5 strength all 4 

extremities


BACK: no cervical, thoracic, lumbar midline tenderness. No saddle anesthesia, 

normal distal neurovascular exam. 


NEUROLOGICAL: Alert and oriented x3. Normal speech. cranial nerves II through 

XII grossly intact 


PSYCH: Normal affect, normal mood.


SKIN: Warm, dry, normal turgor. No rashes or lesions noted.








Course





- Re-evaluation


Re-evalutation: 


Discussed case with patient's primary care provider Dr. Ricci.  He is 

requesting admission to the hospital for her blood pressure control.  He is 

requesting sliding scale dosing for her blood sugar.


Patient's labs show an anion gap of 11, blood sugar 271, no ketones noted on her

urine.  She has not noted to be in diabetic ketoacidosis.  Her blood pressure is

noted to continue to be higher.  Treated with 100 mg of labetalol and admission 

to Dr. Ricci for observation.





- Vital Signs


Vital signs: 


                                        











Temp Pulse Resp BP Pulse Ox


 


 98.7 F   88   18   165/87 H  99 


 


 19 04:33  19 04:33  19 04:33  19 06:05  19 04:33














- Laboratory


Result Diagrams: 


                                 19 22:12





                                 19 22:12


Laboratory results interpreted by me: 


                                        











  19





  22:11 22:12 22:12


 


MCV   77 L 


 


MCH   25.9 L 


 


RDW   16.0 H 


 


Sodium    131.5 L


 


Chloride    97 L


 


Glucose    271 H


 


Urine Glucose (UA)  >=500 H  


 


Urine HCG, Qual  POSITIVE H  














Discharge





- Discharge


Clinical Impression: 


 Hyperglycemia





Hypertension affecting pregnancy


Qualifiers:


 Trimester: first trimester Qualified Code(s): O16.1 - Unspecified maternal 

hypertension, first trimester





Condition: Stable


Disposition: ADMITTED AS OBSERVATION


Admitting Provider: Radhames


Unit Admitted: Medical Floor

## 2019-03-06 NOTE — PDOC H&P
History of Present Illness


Admission Date/PCP: 


  03/06/19 00:12





  [facility]





Patient complains of: High blood sugar


History of Present Illness: 


CHEPE SCHMITT is a 33 year old female known to my practice who was recently 

diagnosed with intrauterine pregnancy. In view of her pregnancy her medication 

for diabetes mellitus and hypertension were changed accordingly. Patient 

reported to the ED due to elevated blood glucose level on home monitor. She 

claimed that her prescribed Humalog will cost about $600.00 out of pocket and 

she could not afford it. Her pharmacy is in the process of resubmitting Novolog 

insulin and she is not aware how much it will cost her due to her current 

prescription coverage limitation. She reported that her home accuchek reading 

remain persistently above 300 mg/dl. She denied any significant nausea, 

vomiting, abdominal pain, or abnormal vaginal bleeding. No fever or chills. No 

urinary frequency, dysuria or flank pain. No chest pain or difficulty with her 

breathing. Her initial evaluation in the ED was significant for hyperglycemia, 

elevated blood pressure and hyponatremia. Her morbidities include diabetes 

mellitus type 2, hypertension, and morbid obesity. She was advised 

hospitalization on observation bed for further evaluation and management.





Past Medical History


Cardiac Medical History: Reports: Hypertension


Endocrine Medical History: Reports: Diabetes Mellitus Type 2





Social History


Smoking Status: Never Smoker





- Advance Directive


Resuscitation Status: Full Code





Family History


Family History: Reviewed & Not Pertinent, Hypertension


Parental Family History Reviewed: Yes


Children Family History Reviewed: Yes


Sibling(s) Family History Reviewed.: Yes





Medication/Allergy


Home Medications: 








Labetalol HCl 100 mg PO BID 03/06/19 


Metformin HCl 1,000 mg PO BID 03/06/19 








Allergies/Adverse Reactions: 


                                        





No Known Allergies Allergy (Verified 02/27/19 07:26)


   











Review of Systems


Constitutional: ABSENT: chills, fever(s), headache(s), weight gain, weight loss


Eyes: ABSENT: visual disturbances


Ears: ABSENT: hearing changes


Cardiovascular: ABSENT: chest pain, dyspnea on exertion, edema, orthropnea, 

palpitations


Respiratory: ABSENT: cough, hemoptysis


Gastrointestinal: ABSENT: abdominal pain, constipation, diarrhea, hematemesis, 

hematochezia, nausea, vomiting


Genitourinary: ABSENT: dysuria, hematuria


Musculoskeletal: ABSENT: joint swelling


Integumentary: ABSENT: rash, wounds


Neurological: ABSENT: abnormal gait, abnormal speech, confusion, dizziness, 

focal weakness, syncope


Psychiatric: ABSENT: anxiety, depression, homidical ideation, suicidal ideation


Endocrine: ABSENT: cold intolerance, heat intolerance, polydipsia, polyuria


Hematologic/Lymphatic: ABSENT: easy bleeding, easy bruising, lymphadenopathy





Physical Exam


Vital Signs: 


                                        











Temp Pulse Resp BP Pulse Ox


 


 98.5 F   83   18   152/89 H  99 


 


 03/06/19 07:54  03/06/19 07:54  03/06/19 07:54  03/06/19 07:54  03/06/19 07:54








                                 Intake & Output











 03/05/19 03/06/19 03/07/19





 06:59 06:59 06:59


 


Intake Total  2000 


 


Balance  2000 


 


Weight  149.232 kg 











General appearance: PRESENT: no acute distress, morbidly obese


Head exam: PRESENT: atraumatic, normocephalic


Eye exam: PRESENT: conjunctiva pink, EOMI, PERRLA.  ABSENT: scleral icterus


Ear exam: PRESENT: normal external ear exam


Mouth exam: PRESENT: moist


Neck exam: PRESENT: full ROM.  ABSENT: JVD, lymphadenopathy, thyromegaly


Respiratory exam: PRESENT: clear to auscultation kim


Cardiovascular exam: PRESENT: RRR.  ABSENT: diastolic murmur, rubs, systolic 

murmur


Pulses: PRESENT: normal dorsalis pedis pul, +2 pedal pulses bilateral


Vascular exam: PRESENT: normal capillary refill.  ABSENT: pallor


GI/Abdominal exam: PRESENT: normal bowel sounds, soft.  ABSENT: distended, 

guarding, mass, organolmegaly, rebound, tenderness


Rectal exam: PRESENT: deferred


Extremities exam: ABSENT: pedal edema


Musculoskeletal exam: ABSENT: deformity


Neurological exam: PRESENT: alert, awake, oriented to person, oriented to place,

oriented to time, oriented to situation, CN II-XII grossly intact.  ABSENT: 

motor sensory deficit


Psychiatric exam: PRESENT: appropriate affect, normal mood.  ABSENT: homicidal 

ideation, suicidal ideation


Skin exam: PRESENT: dry, warm





Results


Laboratory Results: 


                                        





                                 03/05/19 22:12 





                                 03/05/19 22:12 





                                        











  03/05/19 03/05/19 03/05/19





  22:11 22:12 22:12


 


WBC   8.2 


 


RBC   4.96 


 


Hgb   12.9 


 


Hct   38.3 


 


MCV   77 L 


 


MCH   25.9 L 


 


MCHC   33.6 


 


RDW   16.0 H 


 


Plt Count   255 


 


Seg Neutrophils %   58.1 


 


Lymphocytes %   31.7 


 


Monocytes %   8.2 


 


Eosinophils %   1.2 


 


Basophils %   0.8 


 


Absolute Neutrophils   4.7 


 


Absolute Lymphocytes   2.6 


 


Absolute Monocytes   0.7 


 


Absolute Eosinophils   0.1 


 


Absolute Basophils   0.1 


 


Sodium    131.5 L


 


Potassium    4.2


 


Chloride    97 L


 


Carbon Dioxide    24


 


Anion Gap    11


 


BUN    11


 


Creatinine    0.58


 


Est GFR ( Amer)    > 60


 


Est GFR (Non-Af Amer)    > 60


 


Glucose    271 H


 


Calcium    9.4


 


Total Bilirubin    0.6


 


AST    14


 


ALT    24


 


Alkaline Phosphatase    71


 


Total Protein    7.6


 


Albumin    4.2


 


Urine Color  YELLOW  


 


Urine Appearance  CLEAR  


 


Urine pH  6.0  


 


Ur Specific Gravity  1.031  


 


Urine Protein  NEGATIVE  


 


Urine Glucose (UA)  >=500 H  


 


Urine Ketones  NEGATIVE  


 


Urine Blood  NEGATIVE  


 


Urine Nitrite  NEGATIVE  


 


Ur Leukocyte Esterase  NEGATIVE  


 


Urine WBC (Auto)  0  


 


Urine RBC (Auto)  0  














Assessment & Plan





- Diagnosis


(1) Uncontrolled type 2 diabetes mellitus


Qualifiers: 


   Glycemic state: with hyperglycemia   Qualified Code(s): E11.65 - Type 2 

diabetes mellitus with hyperglycemia   


Is this a current diagnosis for this admission?: Yes   


Plan: 


Maintain on qachs accuchek and Humalog Insulin sliding scale coverage and IV 

fluid support. She will remain on her preadmission Metformin 1000 mg p.o bid 

therapy.








(2) Uncontrolled stage 2 hypertension


Is this a current diagnosis for this admission?: Yes   


Plan: 


I will adjust her Labetalol dosage as indicated to keep her SBP < 140mmHg and 

DBP < 90 mmHg. 








(3) Pregnancy and non-insulin-dependent diabetes mellitus in first trimester


Is this a current diagnosis for this admission?: Yes   


Plan: 


Continue her medication management with Prenatal vitamin administration.








(4) Morbid (severe) obesity due to excess calories


Plan: 


Maintain on adequate calorie intake in view of her pregnancy. Encourage portion 

control and walking exercise.








- Time


Time Spent: 50 to 70 Minutes


Medications reviewed and adjusted accordingly: Yes


Anticipated discharge: Home


Within: within 24 hours





- Inpatient Certification


Post Hospital Care: D/C Planner Documentation - Patient may benefit from OB 

medicaid assistance to cover her medication during pregmnancy. I will request 

consultation with discharge planner on this issue. If her blood pressure remain 

fairly controlled and her blood glucose remain in reasonable ranhe she will be 

discharge home withing next 24 hours.





- Plan Summary


Plan Summary: 





See admitting attending physician orders as outline for above care plan.

## 2019-03-07 VITALS — SYSTOLIC BLOOD PRESSURE: 152 MMHG | DIASTOLIC BLOOD PRESSURE: 94 MMHG

## 2019-03-07 RX ADMIN — LABETALOL HYDROCHLORIDE SCH MG: 200 TABLET, FILM COATED ORAL at 06:52

## 2019-03-07 NOTE — PDOC DISCHARGE SUMMARY
General





- Admit/Disc Date/PCP


Admission Date/Primary Care Provider: 


  03/06/19 00:12





  ERIKA CHILD





Discharge Date: 03/07/19





- Discharge Diagnosis


(1) Uncontrolled type 2 diabetes mellitus


Is this a current diagnosis for this admission?: Yes   





(2) Uncontrolled stage 2 hypertension


Is this a current diagnosis for this admission?: Yes   





(3) Pregnancy and non-insulin-dependent diabetes mellitus in first trimester


Is this a current diagnosis for this admission?: Yes   





(4) Morbid (severe) obesity due to excess calories


Is this a current diagnosis for this admission?: Yes   





- Additional Information


Resuscitation Status: Full Code


Discharge Diet: Cardiac, Diabetic


Discharge Activity: Activity As Tolerated


Prescriptions: 


Insulin Regular, Human [Humulin R (Reg) Insulin 100 unit/mL] 0 - 12 unit SUBCUT 

ASDIR PRN #1000 unit


 PRN Reason: 


Labetalol HCl [Normodyne 200 mg Tablet] 200 mg PO Q8 #90 tablet


Metformin HCl 1,000 mg PO BID #60 tablet


NPH, Human Insulin Isophane [Humulin N (NPH) Insulin 100 unit/mL] 15 unit SUBCUT

BIDACBS #1000 unit


Prenatal Vit/Dha [Prenatal Multi + Dha Capsule] 1 cap PO DAILY #90 capsule


Syringe and Needle,Insulin,1Ml [Insulin Syringe 1 mL] 1 syr MC ASDIR PRN #100 

syringe


 PRN Reason: 


Home Medications: 








Insulin Regular, Human [Humulin R (Reg) Insulin 100 unit/mL] 0 - 12 unit SUBCUT 

ASDIR PRN #1000 unit 03/07/19 


Labetalol HCl [Normodyne 200 mg Tablet] 200 mg PO Q8 #90 tablet 03/07/19 


Metformin HCl 1,000 mg PO BID #60 tablet 03/07/19 


NPH, Human Insulin Isophane [Humulin N (NPH) Insulin 100 unit/mL] 15 unit SUBCUT

BIDACBS #1000 unit 03/07/19 


Prenatal Vit/Dha [Prenatal Multi + Dha Capsule] 1 cap PO DAILY #90 capsule 

03/07/19 


Syringe and Needle,Insulin,1Ml [Insulin Syringe 1 mL] 1 syr MC ASDIR PRN #100 

syringe 03/07/19 











History of Present Illness


Patient complains of: Elevated blood sugar


History of Present Illness: 


CHEPE SCHMITT is a 33 year old female known to my practice who was recently 

diagnosed with intrauterine pregnancy. In view of her pregnancy her medication 

for diabetes mellitus and hypertension were changed accordingly. Patient 

reported to the ED due to elevated blood glucose level on home monitor. She 

claimed that her prescribed Humalog will cost about $600.00 out of pocket and 

she could not afford it. Her pharmacy is in the process of resubmitting Novolog 

insulin and she is not aware how much it will cost her due to her current 

prescription coverage limitation. She reported that her home accuchek reading 

remain persistently above 300 mg/dl. She denied any significant nausea, 

vomiting, abdominal pain, or abnormal vaginal bleeding. No fever or chills. No 

urinary frequency, dysuria or flank pain. No chest pain or difficulty with her 

breathing. Her initial evaluation in the ED was significant for hyperglycemia, 

elevated blood pressure and hyponatremia. Her morbidities include diabetes 

mellitus type 2, hypertension, and morbid obesity. She was advised 

hospitalization on observation bed for further evaluation and management.





Hospital Course


Hospital Course: 


Patient was managed with IV fluid support and insulin therapy with improvement 

in her blood glucose level. She remain on oral Metformin with NPH insulin and 

humalog insulin sliding scale management. Her Labetalol dosage was adjusted for 

blood pressure control.. She was seen in consultation by Dr. Friedman, OB 

service, while on admission for her intrauterine pregnancy. She has been 

instructed to follow up upon discharge. She will follow up with me in the office

as instructed upon discharge.





Physical Exam


Vital Signs: 


                                        











Temp Pulse Resp BP Pulse Ox


 


 98.4 F   72   18   168/96 H  100 


 


 03/07/19 04:48  03/07/19 04:48  03/07/19 04:48  03/07/19 04:48  03/07/19 04:48








                                 Intake & Output











 03/06/19 03/07/19 03/08/19





 06:59 06:59 06:59


 


Intake Total 2000 1550 


 


Output Total  600 


 


Balance 2000 950 


 


Weight 149.232 kg 150.7 kg 











General appearance: PRESENT: no acute distress, morbidly obese


Head exam: PRESENT: atraumatic, normocephalic


Eye exam: PRESENT: conjunctiva pink, EOMI, PERRLA.  ABSENT: scleral icterus


Ear exam: PRESENT: normal external ear exam


Mouth exam: PRESENT: moist


Respiratory exam: PRESENT: clear to auscultation kim


Cardiovascular exam: PRESENT: RRR.  ABSENT: diastolic murmur, rubs, systolic 

murmur


Vascular exam: ABSENT: pallor


GI/Abdominal exam: PRESENT: normal bowel sounds, soft.  ABSENT: distended, 

guarding, mass, organolmegaly, rebound, tenderness


Extremities exam: ABSENT: pedal edema


Neurological exam: PRESENT: alert, awake, oriented to person, oriented to place,

oriented to time, oriented to situation, CN II-XII grossly intact.  ABSENT: 

motor sensory deficit


Psychiatric exam: PRESENT: appropriate affect, normal mood.  ABSENT: homicidal 

ideation, suicidal ideation


Skin exam: PRESENT: dry, warm





Results


Laboratory Results: 


                                        





                                 03/05/19 22:12 





                                 03/06/19 09:06 





                                        











  03/06/19 03/06/19 03/06/19





  09:06 09:06 09:06


 


Sodium  135.4 L  


 


Potassium  4.4  


 


Chloride  101  


 


Carbon Dioxide  24  


 


Anion Gap  10  


 


BUN  9  


 


Creatinine  0.64  


 


Est GFR ( Amer)  > 60  


 


Est GFR (Non-Af Amer)  > 60  


 


Glucose  232 H  


 


Uric Acid   2.7 


 


Calcium  9.6  


 


AST   12 L 


 


ALT   19 


 


TSH    0.17 L


 


Free T4    1.72











Impressions: 


                                        





Obstetrics Ultrasound  03/06/19 00:00


IMPRESSION:  LIVING INTRAUTERINE PREGNANCY.


EGA 7 weeks 0 days


Trimester of pregnancy:  First - 0 to 13 weeks.


 














Qualifiers





- *


PATIENT BEING DISCHARGED WITH ANY OF THE FOLLOWING DIAGNOSIS: No





Plan


Discharge Plan: 


D/C home today. Follow up with Dr. Friedman and myself as instructed upon 

discharge.

## 2019-04-12 ENCOUNTER — HOSPITAL ENCOUNTER (OUTPATIENT)
Dept: HOSPITAL 62 - OROUT | Age: 34
Discharge: HOME | End: 2019-04-12
Attending: OBSTETRICS & GYNECOLOGY
Payer: COMMERCIAL

## 2019-04-12 VITALS — DIASTOLIC BLOOD PRESSURE: 79 MMHG | SYSTOLIC BLOOD PRESSURE: 116 MMHG

## 2019-04-12 DIAGNOSIS — E11.9: ICD-10-CM

## 2019-04-12 DIAGNOSIS — O02.1: Primary | ICD-10-CM

## 2019-04-12 DIAGNOSIS — I10: ICD-10-CM

## 2019-04-12 DIAGNOSIS — E66.9: ICD-10-CM

## 2019-04-12 DIAGNOSIS — Z87.891: ICD-10-CM

## 2019-04-12 LAB
ERYTHROCYTE [DISTWIDTH] IN BLOOD BY AUTOMATED COUNT: 16.2 % (ref 11.5–14)
HCT VFR BLD CALC: 33 % (ref 36–47)
HGB BLD-MCNC: 11.3 G/DL (ref 12–15.5)
MCH RBC QN AUTO: 26.1 PG (ref 27–33.4)
MCHC RBC AUTO-ENTMCNC: 34.4 G/DL (ref 32–36)
MCV RBC AUTO: 76 FL (ref 80–97)
PLATELET # BLD: 256 10^3/UL (ref 150–450)
RBC # BLD AUTO: 4.35 10^6/UL (ref 3.72–5.28)
WBC # BLD AUTO: 4.7 10^3/UL (ref 4–10.5)

## 2019-04-12 PROCEDURE — 82962 GLUCOSE BLOOD TEST: CPT

## 2019-04-12 PROCEDURE — 36415 COLL VENOUS BLD VENIPUNCTURE: CPT

## 2019-04-12 PROCEDURE — 88305 TISSUE EXAM BY PATHOLOGIST: CPT

## 2019-04-12 PROCEDURE — 85027 COMPLETE CBC AUTOMATED: CPT

## 2019-04-12 NOTE — OPERATIVE REPORT E
Operative Report



NAME: CHEPE SCHMITT

MRN:  [id number]          : 1985 AGE:  33Y

DATE OF SURGERY: 2019                        ROOM:



PREOPERATIVE DIAGNOSIS:

MISSED AB AT 9 WEEKS.



POSTOPERATIVE DIAGNOSIS:

MISSED AB AT 9 WEEKS.



OPERATION:

SUCTION DILATION AND CURETTAGE.



SURGEON:

SATNAM RODRIGUEZ M.D.



ANESTHESIA STAFF:

Dr. Painter



ANESTHESIA:

General.



FINDINGS:

Uterus sounded to 14 cm.  Copious amounts of products of conception

obtained.



COMPLICATIONS:

None.



ESTIMATED BLOOD LOSS:

200 mL.



SPECIMENS REMOVED:

Products of conception.



PROCEDURE:

The patient was taken to the operating room, prepared and draped in normal

sterile fashion in dorsal lithotomy position under sterile condition.  In

and out catheterization was performed for approximately 300 mL of clear

urine.  The sterile speculum was placed in the vagina.  The cervix was

located and grasped on the anterior lip with a single-tooth tenaculum. 

The uterus was then sounded to approximately 14 cm.  The cervix was

dilated to accommodate an 8 mm curved curette, which was introduced using

the suction device, and suction and curettage was performed until no

further tissue was obtained.  A sharp curettage revealed small amount of

decidua that was removed at that point, and another pass of the suction

curettage was performed to ensure that products of conception were

completely evacuated from the uterus.  At the end of the procedure, the

cervix was reinspected and found to be hemostatic and closing.  The

instruments were removed.  Sponge, lap, and needle counts were correct x2,

and the patient was taken to recovery in stable condition.



DICTATING PHYSICIAN:  SATNAM RODRIGUEZ M.D.





[id number]                  DT: 2019    1650

Y#: [id number]            DD: 2019    1618

ID:   [id number]           JOB#: [id number]       ACCT: [id number]



cc:SATNAM RODRIGUEZ M.D.

>

## 2019-08-09 ENCOUNTER — HOSPITAL ENCOUNTER (EMERGENCY)
Dept: HOSPITAL 62 - ER | Age: 34
Discharge: HOME | End: 2019-08-09
Payer: COMMERCIAL

## 2019-08-09 VITALS — DIASTOLIC BLOOD PRESSURE: 82 MMHG | SYSTOLIC BLOOD PRESSURE: 139 MMHG

## 2019-08-09 DIAGNOSIS — E11.9: ICD-10-CM

## 2019-08-09 DIAGNOSIS — S89.92XA: Primary | ICD-10-CM
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
## 2019-08-09 DIAGNOSIS — F17.200: ICD-10-CM

## 2019-08-09 DIAGNOSIS — I10: ICD-10-CM
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
## 2019-08-09 DIAGNOSIS — M17.12: ICD-10-CM

## 2019-08-09 PROCEDURE — 99283 EMERGENCY DEPT VISIT LOW MDM: CPT

## 2019-08-09 PROCEDURE — 73564 X-RAY EXAM KNEE 4 OR MORE: CPT

## 2019-08-09 PROCEDURE — 96372 THER/PROPH/DIAG INJ SC/IM: CPT

## 2019-08-09 NOTE — RADIOLOGY REPORT (SQ)
EXAM DESCRIPTION:  KNEE LEFT 4 VIEW



COMPLETED DATE/TIME:  8/9/2019 11:32 am



REASON FOR STUDY:  knee pain s/p fall



COMPARISON:  None.



NUMBER OF VIEWS:  Four views.



TECHNIQUE:  AP, lateral, and both oblique radiographic images acquired of the left knee.



LIMITATIONS:  None.



FINDINGS:  MINERALIZATION: Normal.

BONES: No acute fracture or dislocation.  No worrisome bone lesions.

JOINT: Marginal osteophytes are present laterally with slight narrowing of the joint space.  Small po
sterior patellar and trochlear osteophytes are present.  There is no significant joint effusion.

SOFT TISSUES: No soft tissue swelling.  No radio-opaque foreign body.

OTHER: No other significant finding.



IMPRESSION:  Mild degenerative joint disease with no acute finding.



TECHNICAL DOCUMENTATION:  JOB ID:  [id number]

 2011 [employer]- All Rights Reserved



Reading location - IP/workstation name: [email or web address]

## 2019-08-09 NOTE — ER DOCUMENT REPORT
HPI





- HPI


Time Seen by Provider: 08/09/19 09:31


Pain Level: 3


Notes: 





Patient is a 33-year-old female presenting to the emergency department chief 

complaint of left knee pain.  Patient reports she was walking at work when she 

slipped on a wet spot on the floor and fell forward onto her left knee.  She 

reports pain with any movement of the knee.  She denies striking her head, 

denies any loss of consciousness.








- CONSTITUTIONAL


Constitutional: DENIES: Fever, Chills





- EENT


EENT: DENIES: Sore Throat, Ear Pain, Eye problems





- NEURO


Neurology: DENIES: Headache, Weakness, Vision blurred, Dizzinesss / Vertigo





- REPRODUCTIVE


Reproductive: DENIES: Pregnant:





- MUSCULOSKELETAL


Musculoskeletal: REPORTS: Extremity pain - L knee





Past Medical History





- General


Information source: Patient





- Social History


Smoking Status: Current Every Day Smoker


Frequency of alcohol use: None


Drug Abuse: None


Family History: Reviewed & Not Pertinent, Hypertension


Patient has suicidal ideation: No


Patient has homicidal ideation: No





- Past Medical History


Cardiac Medical History: Reports: Hx Hypertension


   Denies: Hx Coronary Artery Disease, Hx Heart Attack


Pulmonary Medical History: 


   Denies: Hx Asthma, Hx Bronchitis, Hx COPD, Hx Pneumonia


Neurological Medical History: Denies: Hx Cerebrovascular Accident, Hx Seizures


Endocrine Medical History: Reports: Hx Diabetes Mellitus Type 2


Renal/ Medical History: Denies: Hx Peritoneal Dialysis


Musculoskeletal Medical History: Denies Hx Arthritis





- Immunizations


Hx Diphtheria, Pertussis, Tetanus Vaccination: No





Vertical Provider Document





- CONSTITUTIONAL


Notes: 





PHYSICAL EXAMINATION:





GENERAL: Well-appearing, well-nourished and in no acute distress.





HEAD: Atraumatic, normocephalic.





EYES: Pupils equal round extraocular movements intact,  conjunctiva are normal.





ENT: Nares patent





NECK: Normal range of motion





LUNGS: No respiratory distress





Musculoskeletal: Limited range of motion to left knee, strong popliteal pulse, 

mild swelling noted without erythema or edema.





NEUROLOGICAL:  Normal speech. 





PSYCH: Normal mood, normal affect.





SKIN: Warm, Dry, normal turgor, no rashes or lesions noted.





- INFECTION CONTROL


TRAVEL OUTSIDE OF THE [place] IN LAST 30 DAYS: No





Course





- Re-evaluation


Re-evalutation: 





                                        





Knee X-Ray  08/09/19 10:40


IMPRESSION:  Mild degenerative joint disease with no acute finding.


 








X-ray was negative for any fracture dislocation.  Unable to rule out internal 

knee injury.  Will place patient in an Ace wrap due to her size and will have 

her use crutches.  Patient given information for orthopedics.  Patient will use 

ice, elevate, compression and ibuprofen at home.  We will follow-up with Ortho 

if not improving over the next several days.











- Vital Signs


Vital signs: 


                                        











Temp Pulse Resp BP Pulse Ox


 


 97.9 F   92   18   146/85 H  99 


 


 08/09/19 08:58  08/09/19 08:58  08/09/19 08:58  08/09/19 08:58  08/09/19 08:58














Procedures





- Immobilization


  ** Left knee


Pre-Proc Neuro Vasc Exam: Normal


Immobilizer type: Ace wrap, Crutches


Performed by: PCT


Post-Proc Neuro Vasc Exam: Normal


Alignment checked and good: Yes





Discharge





- Discharge


Clinical Impression: 


Left knee injury


Qualifiers:


 Encounter type: initial encounter Qualified Code(s): S89.92XA - Unspecified 

injury of left lower leg, initial encounter





Condition: Stable


Disposition: HOME, SELF-CARE


Instructions:  Use of Crutches ([facility]), Ice & Elevation ([facility]), Sprained Knee ([facility])


Additional Instructions: 


The x-ray of your left knee does not show any acute abnormalities such as 

fractures or dislocations.  This does not necessarily rule out an internal knee 

injury such as a torn ligament or tendon.  Please use the Ace wrap for 

compression, use crutches stay off of the left knee as much as possible.  Take 

ibuprofen 600 mg every 6 hours.  Use the pain medication prescribed for severe 

pain only.  If the pain does not resolve or improve over the next several days 

please follow-up with orthopedics a contact has been provided for you below.





Prescriptions: 


Hydrocodone Bit/Acetaminophen [Hydrocodon-Acetaminophen 5-325] 1 each PO Q4H #10

tablet


Forms:  Return to Work


Referrals: 


MARYSE FAIRCHILD DO [ACTIVE STAFF] - Follow up as needed

## 2019-12-26 ENCOUNTER — HOSPITAL ENCOUNTER (EMERGENCY)
Dept: HOSPITAL 62 - ER | Age: 34
Discharge: HOME | End: 2019-12-26
Payer: COMMERCIAL

## 2019-12-26 VITALS — DIASTOLIC BLOOD PRESSURE: 99 MMHG | SYSTOLIC BLOOD PRESSURE: 142 MMHG

## 2019-12-26 DIAGNOSIS — I10: ICD-10-CM
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
## 2019-12-26 DIAGNOSIS — R20.0: ICD-10-CM

## 2019-12-26 DIAGNOSIS — S39.012A: Primary | ICD-10-CM

## 2019-12-26 DIAGNOSIS — E11.9: ICD-10-CM

## 2019-12-26 DIAGNOSIS — M54.32: ICD-10-CM

## 2019-12-26 DIAGNOSIS — F17.200: ICD-10-CM

## 2019-12-26 PROCEDURE — 96372 THER/PROPH/DIAG INJ SC/IM: CPT

## 2019-12-26 PROCEDURE — 84703 CHORIONIC GONADOTROPIN ASSAY: CPT

## 2019-12-26 PROCEDURE — 36415 COLL VENOUS BLD VENIPUNCTURE: CPT

## 2019-12-26 PROCEDURE — 99283 EMERGENCY DEPT VISIT LOW MDM: CPT

## 2019-12-26 NOTE — ER DOCUMENT REPORT
ED Neck/Back Problem





- General


Chief Complaint: Back Pain


Stated Complaint: POSSIBLE PULLED MUSCLE IN BACK


Time Seen by Provider: 12/26/19 09:44


Primary Care Provider: 


ERIKA CHILD MD [Primary Care Provider] - Follow up in 3-5 days


Notes: 





34-year-old female presents with left lower back pain that started upon 

awakening.  Patient states she has associated numbness that runs down her left 

leg.  Patient denies any injury or trauma.  Patient denies any difficulty with 

urinating or defecating.  Patient different denies any saddle anesthesia.  

Patient denies any IV drug use.  Patient states she works on her feet all day 

and this makes it worse.


TRAVEL OUTSIDE OF THE [place] IN LAST 30 DAYS: No





- Related Data


Allergies/Adverse Reactions: 


                                        





No Known Allergies Allergy (Verified 08/09/19 08:46)


   








Home Medications: bp med.  novalin N and R insulins





Past Medical History





- Social History


Smoking Status: Current Every Day Smoker


Family History: Reviewed & Not Pertinent, Hypertension


Patient has suicidal ideation: No


Patient has homicidal ideation: No





- Past Medical History


Cardiac Medical History: Reports: Hx Hypertension


   Denies: Hx Coronary Artery Disease, Hx Heart Attack


Pulmonary Medical History: 


   Denies: Hx Asthma, Hx Bronchitis, Hx COPD, Hx Pneumonia


Neurological Medical History: Denies: Hx Cerebrovascular Accident, Hx Seizures


Endocrine Medical History: Reports: Hx Diabetes Mellitus Type 2


Renal/ Medical History: Denies: Hx Peritoneal Dialysis


Musculoskeletal Medical History: Denies Hx Arthritis





- Immunizations


Hx Diphtheria, Pertussis, Tetanus Vaccination: No





Review of Systems





- Review of Systems


Notes: 





Constitutional: Negative for fever.


HENT: Negative for sore throat.


Eyes: Negative for visual changes.


Cardiovascular: Negative for chest pain.


Respiratory: Negative for shortness of breath.


Gastrointestinal: Negative for abdominal pain, vomiting or diarrhea.


Genitourinary: Negative for dysuria.


Musculoskeletal: Positive for back pain.


Skin: Negative for rash.


Neurological: Negative for headaches, weakness or numbness.





10 point ROS negative except as marked above and in HPI.





Physical Exam





- Vital signs


Vitals: 


                                        











Temp Pulse Resp BP Pulse Ox


 


 97.7 F   110 H  16   155/110 H  100 


 


 12/26/19 06:57  12/26/19 06:57  12/26/19 06:57  12/26/19 06:57  12/26/19 06:57














- Notes


Notes: 





GENERAL: Well-appearing, well-nourished and uncomfortable.


HEAD: Atraumatic, normocephalic.


EYES: Extraocular movements intact, sclera anicteric, conjunctiva are normal.


ENT: TMs normal, nares patent, oropharynx clear without exudates.  Moist mucous 

membranes.


NECK: Normal range of motion, supple without lymphadenopathy or JVD.


ABDOMEN: Soft, nontender.  No guarding, no rebound.  No masses appreciated.


EXTREMITIES: Normal range of motion, no pitting or edema.  No clubbing or cyano

sis.


BACK: No spinal tenderness.  Tenderness to the left lower paraspinal muscles.


NEUROLOGICAL: Cranial nerves II through XII grossly intact.  Normal speech, 

normal gait.  Lower extremity strength equal bilaterally.


PSYCH: Normal mood, normal affect.


SKIN: Warm, Dry, normal turgor, no rashes or lesions noted.





Course





- Re-evaluation


Re-evalutation: 








12/26/19 34-year-old female presents with left low back that started upon 

awakening.  Patient has numbness down the left leg.  Patient denies any 

difficulty with urinating/defecating or saddle anesthesia.  Patient denies any 

history of IV drug abuse.  Patient is nontoxic, well-appearing.  Patient is 

tender over left lower lumbar paraspinal muscles.  No spinal tenderness.  No fu

rther labs or imaging warranted based off of exam.  Patient given shot of 

Toradol and Decadron.  And Flexeril.





12/26/19 11:43 Pt is feeling better.  Patient resting comfortably.  Patient 

given prescription for ibuprofen and prednisone.  Patient also given 

prescription for Nexium to prevent stomach ulcers.  Patient given prescription 

for Flexeril with sedation warnings.  Patient given close follow-up with PCP.  

Strict return precautions given.  Patient voices understanding and agrees with 

plan of care.











- Vital Signs


Vital signs: 


                                        











Temp Pulse Resp BP Pulse Ox


 


 97.7 F   110 H  16   155/110 H  100 


 


 12/26/19 06:57  12/26/19 06:57  12/26/19 06:57  12/26/19 06:57  12/26/19 06:57














Discharge





- Discharge


Clinical Impression: 


Sciatica


Qualifiers:


 Laterality: left Qualified Code(s): M54.32 - Sciatica, left side





Strain of lumbar paraspinal muscle


Qualifiers:


 Encounter type: initial encounter Qualified Code(s): S39.012A - Strain of 

muscle, fascia and tendon of lower back, initial encounter





Condition: Stable


Disposition: HOME, SELF-CARE


Instructions:  Low Back Pain ([facility]), Muscle Strain ([facility]), Warm Packs ([facility])


Additional Instructions: 


Please take medications as prescribed.  Please take Nexium while taking 

ibuprofen and steroids to prevent a stomach ulcer.  Please take Flexeril as 

prescribed.  Do not drink or drive while taking Flexeril as it may make you 

drowsy.  Please follow-up with your primary care doctor in 3 to 5 days.  Return 

immediately to ER if you start having any worsening symptoms, including numbness

to your private area, difficulty with urinating/defecating, worsening pain, 

fever, chest pain, shortness of breath, nausea/vomiting, or any other symptoms 

that are concerning to you.


Prescriptions: 


Prednisone [Deltasone] 20 mg PO BID #14 tablet


Cyclobenzaprine HCl [Flexeril 10 mg Tablet] 10 mg PO TIDP PRN #15 tab


 PRN Reason: 


Ibuprofen [Motrin 800 mg Tablet] 800 mg PO Q8H PRN #30 tab


 PRN Reason: 


Esomeprazole Mag Trihydrate [Nexium] 40 mg PO DAILY #14 capsule.dr


Forms:  Return to Work


Referrals: 


ERIKA CHILD MD [Primary Care Provider] - Follow up in 3-5 days

## 2020-09-23 ENCOUNTER — HOSPITAL ENCOUNTER (INPATIENT)
Dept: HOSPITAL 62 - ER | Age: 35
LOS: 7 days | Discharge: HOME HEALTH | DRG: 256 | End: 2020-09-30
Attending: INTERNAL MEDICINE | Admitting: INTERNAL MEDICINE
Payer: COMMERCIAL

## 2020-09-23 DIAGNOSIS — E11.65: ICD-10-CM

## 2020-09-23 DIAGNOSIS — L03.116: ICD-10-CM

## 2020-09-23 DIAGNOSIS — F17.210: ICD-10-CM

## 2020-09-23 DIAGNOSIS — Z79.899: ICD-10-CM

## 2020-09-23 DIAGNOSIS — I10: ICD-10-CM

## 2020-09-23 DIAGNOSIS — E11.52: Primary | ICD-10-CM

## 2020-09-23 DIAGNOSIS — B95.4: ICD-10-CM

## 2020-09-23 DIAGNOSIS — E11.621: ICD-10-CM

## 2020-09-23 DIAGNOSIS — I96: ICD-10-CM

## 2020-09-23 DIAGNOSIS — L97.528: ICD-10-CM

## 2020-09-23 DIAGNOSIS — Z20.828: ICD-10-CM

## 2020-09-23 DIAGNOSIS — B37.89: ICD-10-CM

## 2020-09-23 DIAGNOSIS — E66.01: ICD-10-CM

## 2020-09-23 DIAGNOSIS — Z79.4: ICD-10-CM

## 2020-09-23 LAB
ADD MANUAL DIFF: NO
ALBUMIN SERPL-MCNC: 3.8 G/DL (ref 3.5–5)
ALP SERPL-CCNC: 78 U/L (ref 38–126)
ANION GAP SERPL CALC-SCNC: 9 MMOL/L (ref 5–19)
APPEARANCE UR: CLEAR
APTT PPP: YELLOW S
AST SERPL-CCNC: 15 U/L (ref 14–36)
BASOPHILS # BLD AUTO: 0.1 10^3/UL (ref 0–0.2)
BASOPHILS NFR BLD AUTO: 0.6 % (ref 0–2)
BILIRUB DIRECT SERPL-MCNC: 0.4 MG/DL (ref 0–0.4)
BILIRUB SERPL-MCNC: 0.7 MG/DL (ref 0.2–1.3)
BILIRUB UR QL STRIP: NEGATIVE
BUN SERPL-MCNC: 7 MG/DL (ref 7–20)
CALCIUM: 9.2 MG/DL (ref 8.4–10.2)
CHLORIDE SERPL-SCNC: 97 MMOL/L (ref 98–107)
CO2 SERPL-SCNC: 27 MMOL/L (ref 22–30)
EOSINOPHIL # BLD AUTO: 0 10^3/UL (ref 0–0.6)
EOSINOPHIL NFR BLD AUTO: 0.2 % (ref 0–6)
ERYTHROCYTE [DISTWIDTH] IN BLOOD BY AUTOMATED COUNT: 17 % (ref 11.5–14)
GLUCOSE SERPL-MCNC: 205 MG/DL (ref 75–110)
GLUCOSE UR STRIP-MCNC: 150 MG/DL
HCT VFR BLD CALC: 34.5 % (ref 36–47)
HGB BLD-MCNC: 12.1 G/DL (ref 12–15.5)
KETONES UR STRIP-MCNC: NEGATIVE MG/DL
LYMPHOCYTES # BLD AUTO: 1.3 10^3/UL (ref 0.5–4.7)
LYMPHOCYTES NFR BLD AUTO: 11.2 % (ref 13–45)
MCH RBC QN AUTO: 25.6 PG (ref 27–33.4)
MCHC RBC AUTO-ENTMCNC: 35.2 G/DL (ref 32–36)
MCV RBC AUTO: 73 FL (ref 80–97)
MONOCYTES # BLD AUTO: 0.8 10^3/UL (ref 0.1–1.4)
MONOCYTES NFR BLD AUTO: 6.8 % (ref 3–13)
NEUTROPHILS # BLD AUTO: 9.5 10^3/UL (ref 1.7–8.2)
NEUTS SEG NFR BLD AUTO: 81.2 % (ref 42–78)
NITRITE UR QL STRIP: NEGATIVE
PH UR STRIP: 6 [PH] (ref 5–9)
PLATELET # BLD: 281 10^3/UL (ref 150–450)
POTASSIUM SERPL-SCNC: 3.9 MMOL/L (ref 3.6–5)
PROT SERPL-MCNC: 7.8 G/DL (ref 6.3–8.2)
PROT UR STRIP-MCNC: NEGATIVE MG/DL
RBC # BLD AUTO: 4.73 10^6/UL (ref 3.72–5.28)
SP GR UR STRIP: 1.01
TOTAL CELLS COUNTED % (AUTO): 100 %
UROBILINOGEN UR-MCNC: 2 MG/DL (ref ?–2)
WBC # BLD AUTO: 11.7 10^3/UL (ref 4–10.5)

## 2020-09-23 PROCEDURE — 83036 HEMOGLOBIN GLYCOSYLATED A1C: CPT

## 2020-09-23 PROCEDURE — 87635 SARS-COV-2 COVID-19 AMP PRB: CPT

## 2020-09-23 PROCEDURE — 93010 ELECTROCARDIOGRAM REPORT: CPT

## 2020-09-23 PROCEDURE — 80053 COMPREHEN METABOLIC PANEL: CPT

## 2020-09-23 PROCEDURE — 87077 CULTURE AEROBIC IDENTIFY: CPT

## 2020-09-23 PROCEDURE — 87040 BLOOD CULTURE FOR BACTERIA: CPT

## 2020-09-23 PROCEDURE — 82565 ASSAY OF CREATININE: CPT

## 2020-09-23 PROCEDURE — 96365 THER/PROPH/DIAG IV INF INIT: CPT

## 2020-09-23 PROCEDURE — 99285 EMERGENCY DEPT VISIT HI MDM: CPT

## 2020-09-23 PROCEDURE — 96366 THER/PROPH/DIAG IV INF ADDON: CPT

## 2020-09-23 PROCEDURE — 88311 DECALCIFY TISSUE: CPT

## 2020-09-23 PROCEDURE — 93005 ELECTROCARDIOGRAM TRACING: CPT

## 2020-09-23 PROCEDURE — 82962 GLUCOSE BLOOD TEST: CPT

## 2020-09-23 PROCEDURE — 80048 BASIC METABOLIC PNL TOTAL CA: CPT

## 2020-09-23 PROCEDURE — 93306 TTE W/DOPPLER COMPLETE: CPT

## 2020-09-23 PROCEDURE — 80202 ASSAY OF VANCOMYCIN: CPT

## 2020-09-23 PROCEDURE — 80061 LIPID PANEL: CPT

## 2020-09-23 PROCEDURE — 01480 ANES OPEN PX LOWER L/A/F NOS: CPT

## 2020-09-23 PROCEDURE — 87075 CULTR BACTERIA EXCEPT BLOOD: CPT

## 2020-09-23 PROCEDURE — 87205 SMEAR GRAM STAIN: CPT

## 2020-09-23 PROCEDURE — 81001 URINALYSIS AUTO W/SCOPE: CPT

## 2020-09-23 PROCEDURE — 85025 COMPLETE CBC W/AUTO DIFF WBC: CPT

## 2020-09-23 PROCEDURE — 83605 ASSAY OF LACTIC ACID: CPT

## 2020-09-23 PROCEDURE — 88305 TISSUE EXAM BY PATHOLOGIST: CPT

## 2020-09-23 PROCEDURE — 87070 CULTURE OTHR SPECIMN AEROBIC: CPT

## 2020-09-23 PROCEDURE — 36415 COLL VENOUS BLD VENIPUNCTURE: CPT

## 2020-09-23 PROCEDURE — C9803 HOPD COVID-19 SPEC COLLECT: HCPCS

## 2020-09-23 PROCEDURE — 96367 TX/PROPH/DG ADDL SEQ IV INF: CPT

## 2020-09-23 PROCEDURE — 81025 URINE PREGNANCY TEST: CPT

## 2020-09-23 RX ADMIN — INSULIN LISPRO SCH: 100 INJECTION, SOLUTION INTRAVENOUS; SUBCUTANEOUS at 22:30

## 2020-09-23 RX ADMIN — PIPERACILLIN AND TAZOBACTAM SCH MLS/HR: 3; .375 INJECTION, POWDER, LYOPHILIZED, FOR SOLUTION INTRAVENOUS; PARENTERAL at 22:41

## 2020-09-23 RX ADMIN — SODIUM CHLORIDE PRN MLS/HR: 9 INJECTION, SOLUTION INTRAVENOUS at 19:45

## 2020-09-23 RX ADMIN — ENOXAPARIN SODIUM SCH MG: 40 INJECTION SUBCUTANEOUS at 19:44

## 2020-09-23 NOTE — ER DOCUMENT REPORT
ED General





- General


Chief Complaint: Skin Sore(s)


Stated Complaint: LEFT PINKY TOE PAIN,LEG PAIN


Time Seen by Provider: 09/23/20 14:09


Primary Care Provider: 


ERIKA CHILD MD [Primary Care Provider] - Follow up as needed


Mode of Arrival: Ambulatory


Information source: Patient


Notes: 





Patient is a 35-year-old -American female with history of diabetes.  Com

es in today for left foot infection.  According to the patient several weeks ago

had a hot pack on her foot.  Blister formed as a result of the hot pack.  

Instead of healing the blister formed an ulceration particularly over the small 

toe.  Now patient is having redness and swelling of the left foot also having 

skin ulceration of the left small digit and there is also some blackening of the

skin of the second toe.  She reports chills.  No nausea or vomiting


TRAVEL OUTSIDE OF THE [place] IN LAST 30 DAYS: No





- Related Data


Allergies/Adverse Reactions: 


                                        





No Known Allergies Allergy (Verified 09/23/20 11:43)


   








Home Medications: metformin.  vitamin b 12.  acidophilus.  amlodipine.  iron.  

irbesartan.  hctz.  novolin.  humulin





Past Medical History





- General


Information source: Patient





- Social History


Smoking Status: Current Every Day Smoker


Chew tobacco use (# tins/day): No


Frequency of alcohol use: None


Drug Abuse: None


Family History: Reviewed & Not Pertinent, Hypertension


Patient has homicidal ideation: No





- Past Medical History


Cardiac Medical History: Reports: Hx Hypertension


   Denies: Hx Coronary Artery Disease, Hx Heart Attack


Pulmonary Medical History: 


   Denies: Hx Asthma, Hx Bronchitis, Hx COPD, Hx Pneumonia


Neurological Medical History: Denies: Hx Cerebrovascular Accident, Hx Seizures


Endocrine Medical History: Reports: Hx Diabetes Mellitus Type 2


Renal/ Medical History: Denies: Hx Peritoneal Dialysis


Musculoskeletal Medical History: Denies Hx Arthritis


Past Surgical History: Reports: Hx Gynecologic Surgery - D&C 2019





- Immunizations


Hx Diphtheria, Pertussis, Tetanus Vaccination: No





Review of Systems





- Review of Systems


Notes: 





Constitutional:  No fevers. +chills.





EENT: No eye redness. No eye pain. No ear pain. No sore throat.





Cardiovascular:  No chest pain. No palpitations.





Respiratory: No cough. No shortness of breath. No respiratory distress.





Gastrointestinal: No abdominal pain. No nausea, vomiting, or diarrhea.





Genitourinary: Atraumatic. No lesions. No pain. No discharge.





Musculoskeletal: Positive left foot infection





Skin: Positive ulceration left foot





Lymphatic: No swollen lymph nodes.





Neurologic: No headache. No syncope.





Psychiatric: No suicidal or homicidal ideation.





Physical Exam





- Vital signs


Vitals: 


                                        











Temp Pulse Resp BP Pulse Ox


 


 99.3 F   128 H  19   164/101 H  100 


 


 09/23/20 10:38  09/23/20 10:38  09/23/20 10:38  09/23/20 10:38  09/23/20 10:38














- Notes


Notes: 





General: Well-developed, well-nourished. In no acute distress. Non-toxic 

appearing.





Cardiac: Well-perfused.  Tachycardia .no murmurs, rubs, or gallops. 





Pulmonary: No respiratory distress. No cyanosis. Bilateral lung fields are clear

to auscultation.





Abdominal: Non-distended. Non-rigid. Bowels sounds are present in all four 

quadrants. No guarding or rebound.





HEENT: Head is atraumatic. Conjunctivae not reddened. No tearing. PERRL. EOMI. 

Orbits atraumatic. No periorbital swelling or erythema. Oropharynx is without 

erythema, swelling, or exudates.





Neck: Supple. No adenopathy. No meningismus.





Dermatologic: Warm with good turgor. No rash. Atraumatic.





Chest: Atraumatic. No chest wall tenderness to palpation.





Musculoskeletal: Left foot examined.  Left small toe is ulcerated over the 

dorsal surface.  There is formation of some black eschar.  Left second toe is 

also turning black over the middle phalanx





Genitourinary: Examination deferred





Neurologic: No gross neurologic deficits.





Psychiatric: Normal mood. 











Course





- Re-evaluation


Re-evalutation: 





09/23/20 15:55


Multiple phone calls placed to Dr. Child cell phone as well as his office.  

No response from either end.  Hospital  will continue trying.


09/23/20 17:06


I was finally able to get through to Dr. Child.  He agrees to admit her.





- Vital Signs


Vital signs: 


                                        











Temp Pulse Resp BP Pulse Ox


 


 99.1 F   128 H  23 H  164/101 H  98 


 


 09/23/20 15:25  09/23/20 10:38  09/23/20 16:00  09/23/20 10:38  09/23/20 16:00














- Laboratory


Result Diagrams: 


                                 09/23/20 12:25





                                 09/23/20 12:25


Laboratory results interpreted by me: 


                                        











  09/23/20 09/23/20





  12:25 12:25


 


WBC  11.7 H 


 


Hct  34.5 L 


 


MCV  73 L 


 


MCH  25.6 L 


 


RDW  17.0 H 


 


Lymph % (Auto)  11.2 L 


 


Absolute Neuts (auto)  9.5 H 


 


Seg Neutrophils %  81.2 H 


 


Sodium   132.6 L


 


Chloride   97 L


 


Glucose   205 H














Discharge





- Discharge


Clinical Impression: 


 Cellulitis of foot





Diabetic ulcer of toe


Qualifiers:


 Diabetes mellitus type: type 2 Laterality: left Non-pressure ulcer stage: with 

other severity Qualified Code(s): E11.621 - Type 2 diabetes mellitus with foot 

ulcer; L97.528 - Non-pressure chronic ulcer of other part of left foot with 

other specified severity





Condition: Good


Disposition: ADMITTED AS INPATIENT


Admitting Provider: Radhames


Unit Admitted: Medical Floor

## 2020-09-23 NOTE — RADIOLOGY REPORT (SQ)
EXAM DESCRIPTION:  FOOT LEFT COMPLETE



IMAGES COMPLETED DATE/TIME:  9/23/2020 12:42 pm



REASON FOR STUDY:  necrosis



COMPARISON:  11/11/2017



NUMBER OF VIEWS:  Three views.



TECHNIQUE:  AP, lateral and oblique  radiographic images acquired of the left foot.



LIMITATIONS:  None.



FINDINGS:  MINERALIZATION: Normal.

BONES: No acute fracture or dislocation.  No worrisome bone lesions.

JOINTS: No effusions.

SOFT TISSUES: No soft tissue swelling.  No foreign body.

OTHER: No other significant finding.



IMPRESSION:  Negative exam.  No conventional radiographic evidence of osteomyelitis.



TECHNICAL DOCUMENTATION:  JOB ID:  [id number]

 [employer]- All Rights Reserved



Reading location - IP/workstation name: [email or web address]

## 2020-09-23 NOTE — ER DOCUMENT REPORT
ED Medical Screen (RME)





- General


Chief Complaint: Skin Sore(s)


Stated Complaint: LEFT PINKY TOE PAIN,LEG PAIN


Primary Care Provider: 


ERIKA CHILD MD [Primary Care Provider] - Follow up as needed


Notes: 





35-year-old female with past medical history of hypertension, diabetes, 

neuropathy presenting today with left foot pain for approximately 3 weeks.  3 

weeks ago she put a hot pack on her left foot due to her feet being swollen and 

a blister developed.  The blister slowly progressed and spread.  She has not 

seen her primary care provider.  She does not know what her blood sugars have 

been.  She cannot feel the bottom portion of her foot.  She takes NovoLog and 

amlodipine.  She denies says that she has had some chills.  No fever.  No 

additional symptoms reported at this time.





Physical exam: Left foot-left second toe is necrotic, necrosis also on the left 

little toe.  She has no sensation along the plantar lateral aspect of her foot, 

no sensation along the big toe.  She has palpable dorsalis pedis pulse.  

Associated erythema and warmth distal to the toes.





I have greeted and performed a rapid initial assessment of this patient. A 

comprehesive ED assessment and evaluation of this patient, analysis of test 

results and completion of the medical decision-making process will be conducted 

by additional ED providers.


TRAVEL OUTSIDE OF THE [place] IN LAST 30 DAYS: No





- Related Data


Allergies/Adverse Reactions: 


                                        





No Known Allergies Allergy (Verified 09/23/20 11:43)


   








Home Medications: metformin.  vitamin b 12.  acidophilus.  amlodipine.  iron.  

irbesartan.  hctz.  novolin.  humulin





Past Medical History





- Social History


Chew tobacco use (# tins/day): No


Frequency of alcohol use: None


Drug Abuse: None





- Past Medical History


Cardiac Medical History: Reports: Hx Hypertension


   Denies: Hx Coronary Artery Disease, Hx Heart Attack


Pulmonary Medical History: 


   Denies: Hx Asthma, Hx Bronchitis, Hx COPD, Hx Pneumonia


Neurological Medical History: Denies: Hx Cerebrovascular Accident, Hx Seizures


Endocrine Medical History: Reports: Hx Diabetes Mellitus Type 2


Renal/ Medical History: Denies: Hx Peritoneal Dialysis


Musculoskeltal Medical History: Denies Hx Arthritis





- Immunizations


Hx Diphtheria, Pertussis, Tetanus Vaccination: No





Physical Exam





- Vital signs


Vitals: 





                                        











Temp Pulse Resp BP Pulse Ox


 


 99.3 F   128 H  19   164/101 H  100 


 


 09/23/20 10:38  09/23/20 10:38  09/23/20 10:38  09/23/20 10:38  09/23/20 10:38














Course





- Vital Signs


Vital signs: 





                                        











Temp Pulse Resp BP Pulse Ox


 


 99.3 F   128 H  19   164/101 H  100 


 


 09/23/20 11:43  09/23/20 10:38  09/23/20 10:38  09/23/20 10:38  09/23/20 10:38














Doctor's Discharge





- Discharge


Referrals: 


ERIKA CHILD MD [Primary Care Provider] - Follow up as needed

## 2020-09-24 LAB
ADD MANUAL DIFF: NO
ALBUMIN SERPL-MCNC: 3.2 G/DL (ref 3.5–5)
ALP SERPL-CCNC: 71 U/L (ref 38–126)
ANION GAP SERPL CALC-SCNC: 8 MMOL/L (ref 5–19)
AST SERPL-CCNC: 14 U/L (ref 14–36)
BASOPHILS # BLD AUTO: 0 10^3/UL (ref 0–0.2)
BASOPHILS NFR BLD AUTO: 0.5 % (ref 0–2)
BILIRUB DIRECT SERPL-MCNC: 0.4 MG/DL (ref 0–0.4)
BILIRUB SERPL-MCNC: 0.8 MG/DL (ref 0.2–1.3)
BUN SERPL-MCNC: 7 MG/DL (ref 7–20)
CALCIUM: 8.6 MG/DL (ref 8.4–10.2)
CHLORIDE SERPL-SCNC: 103 MMOL/L (ref 98–107)
CHOLEST SERPL-MCNC: 138.83 MG/DL (ref 0–200)
CO2 SERPL-SCNC: 25 MMOL/L (ref 22–30)
EOSINOPHIL # BLD AUTO: 0.1 10^3/UL (ref 0–0.6)
EOSINOPHIL NFR BLD AUTO: 1.1 % (ref 0–6)
ERYTHROCYTE [DISTWIDTH] IN BLOOD BY AUTOMATED COUNT: 16.9 % (ref 11.5–14)
GLUCOSE SERPL-MCNC: 248 MG/DL (ref 75–110)
HCT VFR BLD CALC: 32.1 % (ref 36–47)
HGB BLD-MCNC: 11 G/DL (ref 12–15.5)
LDLC SERPL DIRECT ASSAY-MCNC: 81 MG/DL (ref ?–100)
LYMPHOCYTES # BLD AUTO: 1.8 10^3/UL (ref 0.5–4.7)
LYMPHOCYTES NFR BLD AUTO: 20.8 % (ref 13–45)
MCH RBC QN AUTO: 25.1 PG (ref 27–33.4)
MCHC RBC AUTO-ENTMCNC: 34.2 G/DL (ref 32–36)
MCV RBC AUTO: 73 FL (ref 80–97)
MONOCYTES # BLD AUTO: 0.9 10^3/UL (ref 0.1–1.4)
MONOCYTES NFR BLD AUTO: 10.6 % (ref 3–13)
NEUTROPHILS # BLD AUTO: 5.7 10^3/UL (ref 1.7–8.2)
NEUTS SEG NFR BLD AUTO: 67 % (ref 42–78)
PLATELET # BLD: 271 10^3/UL (ref 150–450)
POTASSIUM SERPL-SCNC: 4 MMOL/L (ref 3.6–5)
PROT SERPL-MCNC: 6.8 G/DL (ref 6.3–8.2)
RBC # BLD AUTO: 4.38 10^6/UL (ref 3.72–5.28)
TOTAL CELLS COUNTED % (AUTO): 100 %
TRIGL SERPL-MCNC: 149 MG/DL (ref ?–150)
VLDLC SERPL CALC-MCNC: 30 MG/DL (ref 10–31)
WBC # BLD AUTO: 8.6 10^3/UL (ref 4–10.5)

## 2020-09-24 RX ADMIN — LOSARTAN POTASSIUM SCH MG: 50 TABLET, FILM COATED ORAL at 09:34

## 2020-09-24 RX ADMIN — INSULIN LISPRO SCH UNIT: 100 INJECTION, SOLUTION INTRAVENOUS; SUBCUTANEOUS at 12:01

## 2020-09-24 RX ADMIN — ENOXAPARIN SODIUM SCH MG: 40 INJECTION SUBCUTANEOUS at 09:36

## 2020-09-24 RX ADMIN — PIPERACILLIN AND TAZOBACTAM SCH MLS/HR: 3; .375 INJECTION, POWDER, LYOPHILIZED, FOR SOLUTION INTRAVENOUS; PARENTERAL at 21:35

## 2020-09-24 RX ADMIN — SODIUM CHLORIDE PRN MLS/HR: 9 INJECTION, SOLUTION INTRAVENOUS at 17:07

## 2020-09-24 RX ADMIN — INSULIN LISPRO SCH UNIT: 100 INJECTION, SOLUTION INTRAVENOUS; SUBCUTANEOUS at 22:21

## 2020-09-24 RX ADMIN — PIPERACILLIN AND TAZOBACTAM SCH MLS/HR: 3; .375 INJECTION, POWDER, LYOPHILIZED, FOR SOLUTION INTRAVENOUS; PARENTERAL at 09:36

## 2020-09-24 RX ADMIN — PROBIOTIC PRODUCT - TAB SCH MG: TAB at 17:09

## 2020-09-24 RX ADMIN — PIPERACILLIN AND TAZOBACTAM SCH MLS/HR: 3; .375 INJECTION, POWDER, LYOPHILIZED, FOR SOLUTION INTRAVENOUS; PARENTERAL at 02:44

## 2020-09-24 RX ADMIN — VANCOMYCIN HYDROCHLORIDE SCH MLS/HR: 1 INJECTION, POWDER, LYOPHILIZED, FOR SOLUTION INTRAVENOUS at 13:58

## 2020-09-24 RX ADMIN — VANCOMYCIN HYDROCHLORIDE SCH MLS/HR: 1 INJECTION, POWDER, LYOPHILIZED, FOR SOLUTION INTRAVENOUS at 22:22

## 2020-09-24 RX ADMIN — FERROUS SULFATE TAB 325 MG (65 MG ELEMENTAL FE) SCH MG: 325 (65 FE) TAB at 09:34

## 2020-09-24 RX ADMIN — INSULIN LISPRO SCH UNIT: 100 INJECTION, SOLUTION INTRAVENOUS; SUBCUTANEOUS at 17:09

## 2020-09-24 RX ADMIN — PIPERACILLIN AND TAZOBACTAM SCH MLS/HR: 3; .375 INJECTION, POWDER, LYOPHILIZED, FOR SOLUTION INTRAVENOUS; PARENTERAL at 17:09

## 2020-09-24 RX ADMIN — AMLODIPINE BESYLATE SCH MG: 10 TABLET ORAL at 09:33

## 2020-09-24 RX ADMIN — HYDROCHLOROTHIAZIDE SCH MG: 12.5 CAPSULE ORAL at 09:33

## 2020-09-24 RX ADMIN — TRAMADOL HYDROCHLORIDE PRN MG: 50 TABLET, FILM COATED ORAL at 09:33

## 2020-09-24 RX ADMIN — METFORMIN HYDROCHLORIDE SCH MG: 500 TABLET, FILM COATED ORAL at 17:09

## 2020-09-24 RX ADMIN — INSULIN LISPRO SCH UNIT: 100 INJECTION, SOLUTION INTRAVENOUS; SUBCUTANEOUS at 09:36

## 2020-09-24 RX ADMIN — PANTOPRAZOLE SODIUM SCH MG: 40 TABLET, DELAYED RELEASE ORAL at 05:47

## 2020-09-24 RX ADMIN — METFORMIN HYDROCHLORIDE SCH MG: 500 TABLET, FILM COATED ORAL at 09:34

## 2020-09-24 RX ADMIN — PROBIOTIC PRODUCT - TAB SCH MG: TAB at 09:33

## 2020-09-24 RX ADMIN — INSULIN HUMAN SCH UNIT: 100 INJECTION, SUSPENSION SUBCUTANEOUS at 17:10

## 2020-09-24 RX ADMIN — INSULIN HUMAN SCH UNIT: 100 INJECTION, SUSPENSION SUBCUTANEOUS at 09:35

## 2020-09-24 NOTE — PDOC PROGRESS REPORT
Subjective


Progress Note for:: 09/24/20


Subjective:: 





Patient reported pain in her left foot. Awaiting surgical evaluation. No chest 

pain, difficulty with breathing, nausea, vomiting, or abdominal pain. She 

demonstrate persistent hypoglycemia so far since admission.


Reason For Visit: 


DIABETIC ULCER OF TOE/CELLULITIS








Physical Exam


Vital Signs: 


                                        











Temp Pulse Resp BP Pulse Ox


 


 99.3 F   84   16   125/83   100 


 


 09/23/20 22:27  09/24/20 02:00  09/23/20 22:27  09/23/20 22:27  09/23/20 22:27








                                 Intake & Output











 09/23/20 09/24/20 09/25/20





 06:59 06:59 06:59


 


Intake Total  1000 


 


Balance  1000 


 


Weight  161 kg 











General appearance: PRESENT: mild distress - due to left foot pain, morbidly 

obese


Head exam: PRESENT: atraumatic, normocephalic


Eye exam: PRESENT: conjunctiva pink.  ABSENT: scleral icterus


Mouth exam: PRESENT: moist


Respiratory exam: PRESENT: clear to auscultation kim


Cardiovascular exam: PRESENT: RRR, +S1, +S2.  ABSENT: diastolic murmur, rubs, 

systolic murmur


Vascular exam: ABSENT: pallor


GI/Abdominal exam: PRESENT: normal bowel sounds, soft.  ABSENT: distended, 

guarding, mass, organolmegaly, rebound, tenderness


Extremities exam: ABSENT: pedal edema


Neurological exam: PRESENT: alert, awake, oriented to person, oriented to place,

oriented to time, oriented to situation, CN II-XII grossly intact.  ABSENT: 

motor sensory deficit


Psychiatric exam: PRESENT: appropriate affect, normal mood.  ABSENT: homicidal 

ideation, suicidal ideation


Skin exam: PRESENT: dry, warm.  ABSENT: intact - there is open wound involving 

left 5th toe and to certain degree left 2nd toe. Some improvement in her left 

foot swelling is probably due to elevated position.





Results


Laboratory Results: 


                                        





                                 09/24/20 06:04 





                                 09/24/20 06:04 





                                        











  09/23/20 09/23/20 09/23/20





  12:25 12:25 12:25


 


WBC  11.7 H  


 


RBC  4.73  


 


Hgb  12.1  


 


Hct  34.5 L  


 


MCV  73 L  


 


MCH  25.6 L  


 


MCHC  35.2  


 


RDW  17.0 H  


 


Plt Count  281  


 


Seg Neutrophils %  81.2 H  


 


Sodium   132.6 L 


 


Potassium   3.9 


 


Chloride   97 L 


 


Carbon Dioxide   27 


 


Anion Gap   9 


 


BUN   7 


 


Creatinine   0.59 


 


Est GFR ( Amer)   > 60 


 


Glucose   205 H 


 


Lactic Acid    1.9


 


Calcium   9.2 


 


Total Bilirubin   0.7 


 


AST   15 


 


Alkaline Phosphatase   78 


 


Total Protein   7.8 


 


Albumin   3.8 


 


Triglycerides   


 


Cholesterol   


 


LDL Cholesterol Direct   


 


VLDL Cholesterol   


 


HDL Cholesterol   


 


Urine Color   


 


Urine Appearance   


 


Urine pH   


 


Ur Specific Gravity   


 


Urine Protein   


 


Urine Glucose (UA)   


 


Urine Ketones   


 


Urine Blood   


 


Urine Nitrite   


 


Ur Leukocyte Esterase   


 


Urine WBC (Auto)   


 


Urine RBC (Auto)   














  09/23/20 09/24/20 09/24/20





  17:30 06:04 06:04


 


WBC   8.6 


 


RBC   4.38 


 


Hgb   11.0 L 


 


Hct   32.1 L 


 


MCV   73 L 


 


MCH   25.1 L 


 


MCHC   34.2 


 


RDW   16.9 H 


 


Plt Count   271 


 


Seg Neutrophils %   67.0 


 


Sodium    135.6 L


 


Potassium    4.0


 


Chloride    103


 


Carbon Dioxide    25


 


Anion Gap    8


 


BUN    7


 


Creatinine    0.66


 


Est GFR ( Amer)    > 60


 


Glucose    248 H


 


Lactic Acid   


 


Calcium    8.6


 


Total Bilirubin    0.8


 


AST    14


 


Alkaline Phosphatase    71


 


Total Protein    6.8


 


Albumin    3.2 L


 


Triglycerides    149


 


Cholesterol    138.83


 


LDL Cholesterol Direct    81


 


VLDL Cholesterol    30.0


 


HDL Cholesterol    30 L


 


Urine Color  YELLOW  


 


Urine Appearance  CLEAR  


 


Urine pH  6.0  


 


Ur Specific Gravity  1.014  


 


Urine Protein  NEGATIVE  


 


Urine Glucose (UA)  150 H  


 


Urine Ketones  NEGATIVE  


 


Urine Blood  NEGATIVE  


 


Urine Nitrite  NEGATIVE  


 


Ur Leukocyte Esterase  NEGATIVE  


 


Urine WBC (Auto)  0  


 


Urine RBC (Auto)  1  











Impressions: 


                                        





Foot X-Ray  09/23/20 12:12


IMPRESSION:  Negative exam.  No conventional radiographic evidence of 

osteomyelitis.


 














Assessment & Plan





- Diagnosis


(1) Cellulitis of foot


Is this a current diagnosis for this admission?: Yes   


Plan: 


Continue current antibiotic coverage with Zosyn and Vancomycin ads per pharmacy 

protocol.








(2) Diabetic ulcer of toe


Qualifiers: 


   Diabetes mellitus type: type 2   Laterality: left   Non-pressure ulcer stage:

with other severity   Qualified Code(s): E11.621 - Type 2 diabetes mellitus with

foot ulcer; L97.528 - Non-pressure chronic ulcer of other part of left foot with

other specified severity   


Is this a current diagnosis for this admission?: Yes   


Plan: 


Follow up with surgical consult request for evaluation of her left foot wound 

and consideration of debridement as necessary. Continue daily wet to dry 

dressing.








(3) Uncontrolled stage 2 hypertension


Is this a current diagnosis for this admission?: Yes   


Plan: 


There is fair improvement in her blood pressure control. Continue to monitor 

renal indices as needed.








(4) Uncontrolled type 2 diabetes mellitus


Qualifiers: 


   Glycemic state: with hyperglycemia   Qualified Code(s): E11.65 - Type 2 

diabetes mellitus with hyperglycemia   


Is this a current diagnosis for this admission?: Yes   


Plan: 


Continue current sliding scale coverage. I will restart her preadmission 

diabetic medication since her oral intake is better.








(5) Morbid (severe) obesity due to excess calories


Is this a current diagnosis for this admission?: Yes   


Plan: 


Maintain on dietary choice and calorie restrictions.








- Time


Time Spent with patient: 25-34 minutes


Level of Care: IMCU


Medications reviewed and adjusted accordingly: Yes


Anticipated discharge: Home with Homehealth


Anticipated DC Timeframe: within 72 hours





- Inpatient Certification


Based on my medical assessment, after consideration of the patient's 

comorbidities, presenting symptoms, or acuity I expect that the services needed 

warrant INPATIENT care.: Yes


I certify that my determination is in accordance with my understanding of 

Medicare's requirements for reasonable and necessary INPATIENT services [42 CFR 

412.3e].: Yes


Medical Necessity: Significant Comorbidiites Make Outpatient Treatment Too Risky

, Need Close Monitoring Due to Risk of Patient Decompensation, Need For IV 

Fluids, Need For Continuous Telemetry Monitoring, Need for IV Antibiotics, Need 

for Surgery, Risk of Complication if Not Cared For in Hospital, Risk of 

Diagnosis Which Will Require Inpatient Eval/Care/Monitoring


Post Hospital Care: D/C Planner Documentation





- Plan Summary


Plan Summary: 





See attending physician orders for details about care plan. Repeat request for 

surgical consultation for her left foot wound evaluation and further management 

as necessary.

## 2020-09-24 NOTE — PDOC CONSULTATION
Consultation


Consult Date: 09/24/20


Provider Consulted: ALPESH HALE


Consult reason:: Left foot infection.





History of Present Illness


Admission Date/PCP: 


  09/23/20 18:12





  ERIKA CHILD





Patient complains of: Left foot pain


History of Present Illness: 


CHEPE SCHMITT is a 35 year old female with history of diabetes and hypertension 

presenting with a several week history of a blister on her left foot fifth toe 

which turned black with some associated pain.  Patient developed similar finding

on the second toe more recently.  She has no history of peripheral vascular dise

ase and she denies any claudication symptoms.  No history of atrial fib





Past Medical History


Cardiac Medical History: Reports: Hypertension


   Denies: Coronary Artery Disease, Myocardial Infarction


Pulmonary Medical History: 


   Denies: Asthma, Bronchitis, Chronic Obstructive Pulmonary Disease (COPD), 

Pneumonia


Neurological Medical History: 


   Denies: Seizures


Endocrine Medical History: Reports: Diabetes Mellitus Type 2


Musculoskeltal Medical History: 


   Denies: Arthritis


Psychiatric Medical History: 


   Denies: Depression


Hematology: 


   Denies: Anemia





Social History


Smoking Status: Current Every Day Smoker


Cigarettes Packs Per Day: 0.5


Electronic Cigarette use?: No


Frequency of Alcohol Use: None


Hx Recreational Drug Use: No


Drugs: None


Hx Prescription Drug Abuse: No





- Advance Directive


Resuscitation Status: Full Code





Family History


Family History: Reviewed & Not Pertinent, Hypertension


Parental Family History Reviewed: Yes - High blood pressure and diabetes


Children Family History Reviewed: Yes


Sibling(s) Family History Reviewed.: Yes





Medication/Allergy


Home Medications: 








Metformin HCl 1,000 mg PO BID #60 tablet 03/07/19 


Amlodipine Besylate [Norvasc 10 mg Tablet] 10 mg PO DAILY 09/23/20 


Cyanocobalamin (Vitamin B-12) [Vitamin B-12] 50 mcg PO DAILY 09/23/20 


Ferrous Sulfate [Feosol 325 mg Tablet] 325 mg PO DAILY 09/23/20 


Hydrochlorothiazide [Hydrodiuril 12.5 mg Tablet] 12.5 mg PO DAILY 09/23/20 


Insulin NPH Human Isophane [Novolin N Flexpen] 30 units SQ QPM 09/23/20 


Insulin NPH Human Isophane [Novolin N Flexpen] 50 units SQ QAM 09/23/20 


Insulin Regular, Human [Novolin R] 0 units SQ .PERSLIDINGSCALE MDD AT LUNCH 

09/23/20 


Irbesartan 300 mg PO DAILY 09/23/20 


Lactobacillus Acidophilus/Fos [Acidophilus Probiotic Tablet] 1 tab PO BID 

09/23/20 








Allergies/Adverse Reactions: 


                                        





No Known Allergies Allergy (Verified 09/23/20 11:43)


   











Physical Exam


Vital Signs: 


                                        











Temp Pulse Resp BP Pulse Ox


 


 98.6 F   81   17   120/74   100 


 


 09/24/20 11:33  09/24/20 11:33  09/24/20 11:33  09/24/20 11:33  09/24/20 11:33








                                 Intake & Output











 09/23/20 09/24/20 09/25/20





 06:59 06:59 06:59


 


Intake Total  1000 972


 


Balance  1000 972


 


Weight  161 kg 











General appearance: PRESENT: no acute distress, cooperative


Eye exam: PRESENT: conjunctiva pink


Respiratory exam: PRESENT: clear to auscultation kim


Cardiovascular exam: PRESENT: RRR


Vascular exam: PRESENT: other - 2+ palpable dorsalis pedis pulses bilaterally


GI/Abdominal exam: PRESENT: other - Soft, nondistended, nontender to palpation.


Extremities exam: PRESENT: other - No finger splinter hemorrhages.  Right foot 

appears normal with no ulcerations and no lesions.  Left forefoot has diffuse 

swelling with black discoloration of the fifth toe with purulent discharge and 

surrounding erythema.  The left second toe has a black discoloration but no 

discharge.  No crepitus.


Neurological exam: PRESENT: alert, awake


Psychiatric exam: PRESENT: appropriate affect


Skin exam: PRESENT: warm





Results


Laboratory Results: 


                                        





                                 09/24/20 06:04 





                                 09/24/20 06:04 





                                        











  09/23/20 09/24/20 09/24/20





  17:30 06:04 06:04


 


WBC   8.6 


 


RBC   4.38 


 


Hgb   11.0 L 


 


Hct   32.1 L 


 


MCV   73 L 


 


MCH   25.1 L 


 


MCHC   34.2 


 


RDW   16.9 H 


 


Plt Count   271 


 


Seg Neutrophils %   67.0 


 


Sodium    135.6 L


 


Potassium    4.0


 


Chloride    103


 


Carbon Dioxide    25


 


Anion Gap    8


 


BUN    7


 


Creatinine    0.66


 


Est GFR ( Amer)    > 60


 


Glucose    248 H


 


Calcium    8.6


 


Total Bilirubin    0.8


 


AST    14


 


Alkaline Phosphatase    71


 


Total Protein    6.8


 


Albumin    3.2 L


 


Triglycerides    149


 


Cholesterol    138.83


 


LDL Cholesterol Direct    81


 


VLDL Cholesterol    30.0


 


HDL Cholesterol    30 L


 


Urine Color  YELLOW  


 


Urine Appearance  CLEAR  


 


Urine pH  6.0  


 


Ur Specific Gravity  1.014  


 


Urine Protein  NEGATIVE  


 


Urine Glucose (UA)  150 H  


 


Urine Ketones  NEGATIVE  


 


Urine Blood  NEGATIVE  


 


Urine Nitrite  NEGATIVE  


 


Ur Leukocyte Esterase  NEGATIVE  


 


Urine WBC (Auto)  0  


 


Urine RBC (Auto)  1  











Impressions: 


                                        





Foot X-Ray  09/23/20 12:12


IMPRESSION:  Negative exam.  No conventional radiographic evidence of 

osteomyelitis.


 














Assessment & Plan





- Diagnosis


(1) Diabetic infection of left foot


Is this a current diagnosis for this admission?: Yes   


Plan: 


Of the left fifth toe.  Will need  5th toe amputation.  It has the appearance of

wet gangrene.  Patient with black discoloration of the left second toe without 

purulent discharge.Patient will benefit from amputation of this toe as well at 

the same time.  Patient has excellent pulses of her left foot.  Recommend 

cardiology consultation for echocardiography to rule out a embolic focus.  Plan 

left fifth and second toe amputation tomorrow.  We will make the patient n.p.o. 

post midnight.  I have discussed with the patient the risk and benefits of 

surgery including risk of poor wound healing, need for additional surgery, need 

for additional surgery, infection, bleeding, cardiopulmonary risks.  Patient 

understands and agrees to proceed.

## 2020-09-24 NOTE — PDOC H&P
History of Present Illness


Admission Date/PCP: 


  09/23/20 18:12





  [facility]





Patient complains of: Left foot pain, swelling, sore


History of Present Illness: 


CHEPE SCHMITT is a 35 year old female patient known to my practice who presented

to the ED with several weeks of left foot and leg pain. Patient reported usage 

of warm compress on her left foot for pain. She subsequently developed blister 

lesion on her pinky toe that eventually developed into a sore.  Patient reported

development of warmth and swelling over her left foot. There is development of 

black discoloration over her second toe with increasing pain that prompted her 

coming to the ED. She denied any definite fever or elevated temperature but 

admitted to intermittent chills. She has history of diabetes mellitus type 2 and

admitted to poor glycemic control. Her medication and dietary compliance remain 

a concern. She denied any chest pain, abdominal pain, nausea, or vomiting. Her 

morbidities are as listed below. She was advised hospitalization for further 

evaluation and management.








Past Medical History


Cardiac Medical History: Reports: Hypertension


   Denies: Coronary Artery Disease, Myocardial Infarction


Pulmonary Medical History: 


   Denies: Asthma, Bronchitis, Chronic Obstructive Pulmonary Disease (COPD), 

Pneumonia


Neurological Medical History: 


   Denies: Seizures


Endocrine Medical History: Reports: Diabetes Mellitus Type 2


Musculoskeltal Medical History: 


   Denies: Arthritis


Psychiatric Medical History: 


   Denies: Depression


Hematology: 


   Denies: Anemia





Social History


Smoking Status: Current Every Day Smoker


Cigarettes Packs Per Day: 0.5


Electronic Cigarette use?: No


Frequency of Alcohol Use: None


Hx Recreational Drug Use: No


Drugs: None


Hx Prescription Drug Abuse: No





- Advance Directive


Resuscitation Status: Full Code





Family History


Family History: Reviewed & Not Pertinent, Hypertension


Parental Family History Reviewed: Yes


Children Family History Reviewed: Yes


Sibling(s) Family History Reviewed.: Yes





Medication/Allergy


Home Medications: 








Metformin HCl 1,000 mg PO BID #60 tablet 03/07/19 


Amlodipine Besylate [Norvasc 10 mg Tablet] 10 mg PO DAILY 09/23/20 


Cyanocobalamin (Vitamin B-12) [Vitamin B-12] 50 mcg PO DAILY 09/23/20 


Ferrous Sulfate [Feosol 325 mg Tablet] 325 mg PO DAILY 09/23/20 


Hydrochlorothiazide [Hydrodiuril 12.5 mg Tablet] 12.5 mg PO DAILY 09/23/20 


Insulin NPH Human Isophane [Novolin N Flexpen] 30 units SQ QPM 09/23/20 


Insulin NPH Human Isophane [Novolin N Flexpen] 50 units SQ QAM 09/23/20 


Insulin Regular, Human [Novolin R] 0 units SQ .PERSLIDINGSCALE MDD AT LUNCH 09 /23/20 


Irbesartan 300 mg PO DAILY 09/23/20 


Lactobacillus Acidophilus/Fos [Acidophilus Probiotic Tablet] 1 tab PO BID 

09/23/20 








Allergies/Adverse Reactions: 


                                        





No Known Allergies Allergy (Verified 09/23/20 11:43)


   











Review of Systems


Constitutional: PRESENT: chills.  ABSENT: fever(s), headache(s)


Eyes: ABSENT: visual disturbances


Ears: ABSENT: hearing changes


Nose, Mouth, and Throat: ABSENT: headache(s), vertigo


Cardiovascular: ABSENT: chest pain, dyspnea on exertion, edema, orthropnea, 

palpitations


Respiratory: ABSENT: cough, dyspnea, hemoptysis, sputum


Gastrointestinal: ABSENT: abdominal pain, constipation, diarrhea, hematemesis, 

hematochezia, nausea, vomiting


Genitourinary: ABSENT: difficulty urinating, dysuria, hematuria


Musculoskeletal: PRESENT: other - foot swelling with pain and open wound on left

5th and blister lesion on 2nd toe.  ABSENT: joint swelling


Integumentary: ABSENT: rash, wounds


Neurological: ABSENT: abnormal gait, abnormal speech, confusion, dizziness, 

focal weakness, syncope


Psychiatric: ABSENT: anxiety, depression, homidical ideation, suicidal ideation


Endocrine: ABSENT: cold intolerance, heat intolerance, menstrual abnormalities, 

polydipsia, polyuria


Hematologic/Lymphatic: ABSENT: easy bleeding, easy bruising, lymphadenopathy


Allergic/Immunologic: ABSENT: seasonal rhinorrhea





Physical Exam


Vital Signs: 


                                        











Temp Pulse Resp BP Pulse Ox


 


 99.3 F   84   16   125/83   100 


 


 09/23/20 22:27  09/24/20 02:00  09/23/20 22:27  09/23/20 22:27  09/23/20 22:27








                                 Intake & Output











 09/23/20 09/24/20 09/25/20





 06:59 06:59 06:59


 


Intake Total  1000 


 


Balance  1000 


 


Weight  161 kg 











General appearance: PRESENT: no acute distress, morbidly obese


Head exam: PRESENT: atraumatic, normocephalic


Eye exam: PRESENT: conjunctiva pink, EOMI, PERRLA.  ABSENT: scleral icterus


Ear exam: PRESENT: normal external ear exam


Mouth exam: PRESENT: moist, tongue midline


Neck exam: PRESENT: full ROM.  ABSENT: carotid bruit, JVD, lymphadenopathy, 

thyromegaly


Respiratory exam: PRESENT: clear to auscultation kim


Cardiovascular exam: PRESENT: RRR, +S1, +S2.  ABSENT: diastolic murmur, rubs, 

systolic murmur


Pulses: PRESENT: normal dorsalis pedis pul, +2 pedal pulses bilateral


Vascular exam: PRESENT: normal capillary refill.  ABSENT: pallor


GI/Abdominal exam: PRESENT: normal bowel sounds, soft.  ABSENT: distended, 

guarding, mass, organolmegaly, rebound, tenderness


Rectal exam: PRESENT: deferred


Extremities exam: PRESENT: tenderness - left foot wth sweling, open wound on 

left 5th toe and around 2nd toe..  ABSENT: pedal edema


Musculoskeletal exam: PRESENT: ambulatory


Neurological exam: PRESENT: alert, awake, oriented to person, oriented to place,

oriented to time, oriented to situation, CN II-XII grossly intact.  ABSENT: 

motor sensory deficit


Psychiatric exam: PRESENT: appropriate affect, normal mood.  ABSENT: homicidal 

ideation, suicidal ideation


Skin exam: PRESENT: dry, intact, warm.  ABSENT: cyanosis, rash





Results


Laboratory Results: 


                                        





                                 09/24/20 06:04 





                                 09/24/20 06:04 





                                        











  09/23/20 09/23/20 09/23/20





  12:25 12:25 12:25


 


WBC  11.7 H  


 


RBC  4.73  


 


Hgb  12.1  


 


Hct  34.5 L  


 


MCV  73 L  


 


MCH  25.6 L  


 


MCHC  35.2  


 


RDW  17.0 H  


 


Plt Count  281  


 


Seg Neutrophils %  81.2 H  


 


Sodium   132.6 L 


 


Potassium   3.9 


 


Chloride   97 L 


 


Carbon Dioxide   27 


 


Anion Gap   9 


 


BUN   7 


 


Creatinine   0.59 


 


Est GFR ( Amer)   > 60 


 


Glucose   205 H 


 


Lactic Acid    1.9


 


Calcium   9.2 


 


Total Bilirubin   0.7 


 


AST   15 


 


Alkaline Phosphatase   78 


 


Total Protein   7.8 


 


Albumin   3.8 


 


Triglycerides   


 


Cholesterol   


 


LDL Cholesterol Direct   


 


VLDL Cholesterol   


 


HDL Cholesterol   


 


Urine Color   


 


Urine Appearance   


 


Urine pH   


 


Ur Specific Gravity   


 


Urine Protein   


 


Urine Glucose (UA)   


 


Urine Ketones   


 


Urine Blood   


 


Urine Nitrite   


 


Ur Leukocyte Esterase   


 


Urine WBC (Auto)   


 


Urine RBC (Auto)   














  09/23/20 09/24/20 09/24/20





  17:30 06:04 06:04


 


WBC   8.6 


 


RBC   4.38 


 


Hgb   11.0 L 


 


Hct   32.1 L 


 


MCV   73 L 


 


MCH   25.1 L 


 


MCHC   34.2 


 


RDW   16.9 H 


 


Plt Count   271 


 


Seg Neutrophils %   67.0 


 


Sodium    135.6 L


 


Potassium    4.0


 


Chloride    103


 


Carbon Dioxide    25


 


Anion Gap    8


 


BUN    7


 


Creatinine    0.66


 


Est GFR ( Amer)    > 60


 


Glucose    248 H


 


Lactic Acid   


 


Calcium    8.6


 


Total Bilirubin    0.8


 


AST    14


 


Alkaline Phosphatase    71


 


Total Protein    6.8


 


Albumin    3.2 L


 


Triglycerides    149


 


Cholesterol    138.83


 


LDL Cholesterol Direct    81


 


VLDL Cholesterol    30.0


 


HDL Cholesterol    30 L


 


Urine Color  YELLOW  


 


Urine Appearance  CLEAR  


 


Urine pH  6.0  


 


Ur Specific Gravity  1.014  


 


Urine Protein  NEGATIVE  


 


Urine Glucose (UA)  150 H  


 


Urine Ketones  NEGATIVE  


 


Urine Blood  NEGATIVE  


 


Urine Nitrite  NEGATIVE  


 


Ur Leukocyte Esterase  NEGATIVE  


 


Urine WBC (Auto)  0  


 


Urine RBC (Auto)  1  











Impressions: 


                                        





Foot X-Ray  09/23/20 12:12


IMPRESSION:  Negative exam.  No conventional radiographic evidence of 

osteomyelitis.


 














Assessment & Plan





- Diagnosis


(1) Cellulitis of foot


Is this a current diagnosis for this admission?: Yes   


Plan: 


See admitting attending physician orders for details.








(2) Diabetic ulcer of toe


Qualifiers: 


   Diabetes mellitus type: type 2   Laterality: left   Non-pressure ulcer stage:

with other severity   Qualified Code(s): E11.621 - Type 2 diabetes mellitus with

foot ulcer; L97.528 - Non-pressure chronic ulcer of other part of left foot with

other specified severity   


Is this a current diagnosis for this admission?: Yes   


Plan: 


See admitting attending physician orders for details.








(3) Uncontrolled stage 2 hypertension


Is this a current diagnosis for this admission?: Yes   


Plan: 


See admitting attending physician orders for details.








(4) Uncontrolled type 2 diabetes mellitus


Qualifiers: 


   Glycemic state: with hyperglycemia   Qualified Code(s): E11.65 - Type 2 

diabetes mellitus with hyperglycemia   


Is this a current diagnosis for this admission?: Yes   


Plan: 


See admitting attending physician orders for details.








(5) Morbid (severe) obesity due to excess calories


Is this a current diagnosis for this admission?: Yes   


Plan: 


See admitting attending physician orders for details.








- Time


Time Spent: 50 to 70 Minutes


Medications reviewed and adjusted accordingly: Yes


Anticipated Discharge Disposition: Home with Home Health


Anticipated Discharge Timeframe: within 72 hours





- Inpatient Certification


Based on my medical assessment, after consideration of the patient's comorbid

ities, presenting symptoms, or acuity I expect that the services needed warrant 

INPATIENT care.: Yes


I certify that my determination is in accordance with my understanding of 

Medicare's requirements for reasonable and necessary INPATIENT services [42 CFR 

412.3e].: Yes


Medical Necessity: Significant Comorbidiites Make Outpatient Treatment Too 

Risky, Need Close Monitoring Due to Risk of Patient Decompensation, Need For IV 

Fluids, Need For Continuous Telemetry Monitoring, Need for IV Antibiotics, Need 

for Surgery, Risk of Complication if Not Cared For in Hospital, Risk of 

Diagnosis Which Will Require Inpatient Eval/Care/Monitoring


Post Hospital Care: D/C Planner Documentation





- Plan Summary


Plan Summary: 





See admitting attending physician orders for details.

## 2020-09-24 NOTE — PDOC CONSULTATION
Consultation-Blank


Consultation: 





CARDIOLOGY consultation by Dr. Saumya Duke on 9/24/2020.  Patient seen at

9:15 PM.  60 minutes spent on this patient more than 50% of time spent in direct

patient care.





REASON FOR CONSULTATION: Preoperative cardiac risk assessment for left fifth toe

amputation.





CONSULT REQUESTING PHYSICIAN: Dr. Ricci.





HISTORY of PRESENT ILLNESS: Patient is a 35-year-old Afro-American female with 

known history of hypertension, diabetes mellitus type 2 insulin-dependent 

admitted with a few weeks history of blister in the left fifth toe which is 

turned into a diabetic ulcer with gangrene and infection.  Hence patient's for 

amputation of the left fifth toe.  She also has early changes of diabetic 

infection/gangrene of the left second toe.  The patient denies any chest pain or

discomfort.  There is no shortness of breath.  Although she is a smoker there is

no history of COPD.  There is no history of sleep apnea.  There is no history of

asthma.  The patient denies any symptoms suggestive of cold infection.  She has 

no cough or upper respiratory tract infection.  She has no history of dyspnea on

exertion.  She is obese but has no history of sleep apnea.  She has no chest 

pain discomfort.  There is no prior history of MI angina coronary artery 

disease.  She has no history of atrial fibrillation or other cardiac 

arrhythmias.  There is no history of congestive heart failure.  No history of 

palpitations or syncope.











Past Medical History


Cardiac Medical History: Reports: Hypertension


   Denies: Coronary Artery Disease, Myocardial Infarction


Pulmonary Medical History: 


   Denies: Asthma, Bronchitis, Chronic Obstructive Pulmonary Disease (COPD), 

Pneumonia


Neurological Medical History: 


   Denies: Seizures


Endocrine Medical History: Reports: Diabetes Mellitus Type 2


Musculoskeltal Medical History: 


   Denies: Arthritis


Psychiatric Medical History: 


   Denies: Depression


Hematology: 


   Denies: Anemia





Social History


Smoking Status: Current Every Day Smoker


Cigarettes Packs Per Day: 0.5


Electronic Cigarette use?: No


Frequency of Alcohol Use: None


Hx Recreational Drug Use: No


Drugs: None


Hx Prescription Drug Abuse: No





- Advance Directive


Resuscitation Status: Full Code.  The patient's mother is her surrogate 

healthcare decision maker.





Family History


Family History: Reviewed & Not Pertinent, Hypertension


Parental Family History Reviewed: Yes


Children Family History Reviewed: Yes


Sibling(s) Family History Reviewed.: Yes





Medication/Allergy


Home Medications: 








Metformin HCl 1,000 mg PO BID #60 tablet 03/07/19 


Amlodipine Besylate [Norvasc 10 mg Tablet] 10 mg PO DAILY 09/23/20 


Cyanocobalamin (Vitamin B-12) [Vitamin B-12] 50 mcg PO DAILY 09/23/20 


Ferrous Sulfate [Feosol 325 mg Tablet] 325 mg PO DAILY 09/23/20 


Hydrochlorothiazide [Hydrodiuril 12.5 mg Tablet] 12.5 mg PO DAILY 09/23/20 


Insulin NPH Human Isophane [Novolin N Flexpen] 30 units SQ QPM 09/23/20 


Insulin NPH Human Isophane [Novolin N Flexpen] 50 units SQ QAM 09/23/20 


Insulin Regular, Human [Novolin R] 0 units SQ .PERSLIDINGSCALE MDD AT LUNCH 

09/23/20 


Irbesartan 300 mg PO DAILY 09/23/20 


Lactobacillus Acidophilus/Fos [Acidophilus Probiotic Tablet] 1 tab PO BID 

09/23/20 








Allergies/Adverse Reactions: No Known Allergies Allergy (Verified 09/23/20 

11:43)


   


Current Medications











Generic Name Dose Route Start Last Admin





  Trade Name Brisa  PRN Reason Stop Dose Admin


 


Amlodipine Besylate  10 mg  09/24/20 10:00  09/24/20 09:33





  Norvasc 10 Mg Tablet  PO  10/24/20 09:59  10 mg





  DAILY NARA   Administration


 


Dextrose  12.5 gm  09/23/20 18:42 





  Dextrose Inj 50% Syringe (25 Gm/50 Ml)  IV  10/23/20 18:41 





  PRN PRN  





  FOR BG 50-69 IN ALERT PATIENT  





  Protocol  


 


Dextrose  25 gm  09/23/20 18:42 





  Dextrose Inj 50% Syringe (25 Gm/50 Ml)  IV  10/23/20 18:41 





  PRN PRN  





  PER PROTOCOL  





  Protocol  


 


Enoxaparin Sodium  40 mg  09/23/20 19:30  09/24/20 09:36





  Lovenox Inj 40 Mg/0.4 Ml Disp.Syrin  SUBCUT  10/23/20 19:29  40 mg





  DAILY NARA   Administration


 


Ferrous Sulfate  325 mg  09/24/20 10:00  09/24/20 09:34





  Feosol 325 Mg Tablet  PO  10/24/20 09:59  325 mg





  DAILY NARA   Administration


 


Glucagon  1 mg  09/23/20 18:42 





  Glucagen Inj 1 Mg Vial  IM  10/23/20 18:41 





  PRN PRN  





  Evaluate for BG < 70  





  Protocol  


 


Glucose  15 gm  09/23/20 18:42 





  Glutose 40% Gel 15 Gm Tube  PO  10/23/20 18:41 





  PRN PRN  





  FOR BG 50-69 IN ALERT PATIENT  





  Protocol  


 


Glucose  30 gm  09/23/20 18:42 





  Glutose 40% Gel 15 Gm Tube  PO  10/23/20 18:41 





  PRN PRN  





  FOR BG < 50 IN ALERT PATIENT   





  Protocol  


 


Hydrochlorothiazide  12.5 mg  09/24/20 10:00  09/24/20 09:33





  Hydrodiuril 12.5 Mg Tablet  PO  10/24/20 09:59  12.5 mg





  DAILY NARA   Administration


 


Sodium Chloride  1,000 mls @ 100 mls/hr  09/23/20 18:42  09/24/20 17:07





  Nacl 0.9% 1000 Ml Iv Soln  IV  10/23/20 18:41  100 mls/hr





  CONTINUOUS PRN   Administration





  THIS MED IS NOT "PRN"  


 


Piperacillin Sod/Tazobactam  100 mls @ 200 mls/hr  09/23/20 21:00  09/24/20 

21:35





  Sod 3.375 gm/ Sodium Chloride  IV  09/30/20 20:59  200 mls/hr





  Q6A NARA   Administration


 


Vancomycin HCl 1,500 mg/  250 mls @ 166.667 mls/hr  09/24/20 14:00  09/24/20 

22:22





  Dextrose  IV  10/01/20 13:59  166.66 mls/hr





  Q8 NARA   Administration


 


Insulin Human Lispro  0 - 12 unit  09/23/20 22:00  09/24/20 22:21





  Humalog Insulin 100 Unit/1 Ml 3 Ml Vial  SUBCUT  10/23/20 21:59  4 unit





  ACHS NARA   Administration





  Protocol  


 


Insulin Human NPH  50 unit  09/24/20 08:00  09/24/20 09:35





  Humulin N (Nph) Insulin 100 Unit/1 Ml 3 Ml  SUBCUT  10/24/20 07:59  50 unit





  QAM NARA   Administration


 


Insulin Human NPH  30 unit  09/24/20 18:00  09/24/20 17:10





  Humulin N (Nph) Insulin 100 Unit/1 Ml 3 Ml  SUBCUT  10/24/20 17:59  30 unit





  QPM NARA   Administration


 


Lactobacillus Acidophilus  250 mg  09/24/20 10:00  09/24/20 17:09





  Bacid 250 Mg Tablet  PO  10/24/20 09:59  250 mg





  BID NARA   Administration


 


Losartan Potassium  100 mg  09/24/20 10:00  09/24/20 09:34





  Cozaar 50 Mg Tablet  PO  10/24/20 09:59  100 mg





  DAILY NARA   Administration


 


Metformin HCl  1,000 mg  09/24/20 08:00  09/24/20 17:09





  Glucophage 500 Mg Tablet  PO  10/24/20 07:59  1,000 mg





  BIDACBS NARA   Administration


 


Pantoprazole Sodium  40 mg  09/24/20 06:00  09/24/20 05:47





  Protonix 40 Mg Dr Tablet  PO  10/24/20 05:59  40 mg





  Q6AM NARA   Administration


 


Patient Own Medication  50 mcg  09/24/20 10:00 





  Cyanocobalamin (Vitamin B-12) [Vitamin B-12]  PO  10/24/20 09:59 





  .DAILY NARA  


 


Tramadol HCl  50 mg  09/24/20 08:30  09/24/20 09:33





  Ultram 50 Mg Tablet  PO  10/01/20 08:29  50 mg





  Q6HP PRN   Administration





  FOR PAIN  














Discontinued Medications














Generic Name Dose Route Start Last Admin





  Trade Name Freq  PRN Reason Stop Dose Admin


 


Acetaminophen  975 mg  09/23/20 14:47  09/23/20 14:52





  Tylenol 325 Mg Tablet  PO  09/23/20 14:48  975 mg





  NOW ONE   Administration


 


Sodium Chloride  1,000 mls @ 0 mls/hr  09/23/20 14:26  09/23/20 15:35





  Nacl 0.9% 1000 Ml Iv Soln  IV  09/23/20 14:27  Infused





  BOLUS ONE   Infusion





  Wide Open  


 


Vancomycin HCl 2,000 mg/  500 mls @ 250 mls/hr  09/24/20 06:00  09/24/20 05:47





  Dextrose  IV  10/01/20 05:59  250 mls/hr





  Q12A NARA   250 mls/hr





    Administration


 


Piperacillin Sod/Tazobactam Sod  3.375 gm  09/23/20 12:16  09/23/20 14:22





  Zosyn Inj 3.375 Gm Vial  IV  09/23/20 12:17  3.375 gm





  IVBAG (ED) ONE   Administration


 


Vancomycin HCl  2,000 mg  09/23/20 12:14  09/23/20 15:22





  Vancocin Inj 1000 Mg Vial  IV  09/23/20 12:15  2,000 mg





  IVBAG (ED) ONE   Administration

















Review of Systems


Constitutional: PRESENT: chills.  ABSENT: fever(s), headache(s)


Eyes: ABSENT: visual disturbances


Ears: ABSENT: hearing changes


Nose, Mouth, and Throat: ABSENT: headache(s), vertigo


Cardiovascular: ABSENT: chest pain, dyspnea on exertion, edema, orthropnea, 

palpitations


Respiratory: ABSENT: cough, dyspnea, hemoptysis, sputum


Gastrointestinal: ABSENT: abdominal pain, constipation, diarrhea, hematemesis, 

hematochezia, nausea, vomiting


Genitourinary: ABSENT: difficulty urinating, dysuria, hematuria


Musculoskeletal: PRESENT: other - foot swelling with pain and open wound on left

5th and blister lesion on 2nd toe.  ABSENT: joint swelling


Integumentary: ABSENT: rash, wounds


Neurological: ABSENT: abnormal gait, abnormal speech, confusion, dizziness, 

focal weakness, syncope


Psychiatric: ABSENT: anxiety, depression, homidical ideation, suicidal ideation


Endocrine: ABSENT: cold intolerance, heat intolerance, menstrual abnormalities, 

polydipsia, polyuria


Hematologic/Lymphatic: ABSENT: easy bleeding, easy bruising, lymphadenopathy


Allergic/Immunologic: ABSENT: seasonal rhinorrhea





PHYSICAL EXAMINATION: The patient is morbidly obese.  In no acute distress.


                                                                Selected Entries











  09/24/20





  20:00


 


Temperature 98.9 F


 


Temperature Oral





Source 


 


Pulse Rate 92


 


Respiratory 14





Rate 


 


Blood Pressure 143/85 H





[Upper Arm] 


 


Blood Pressure 104





Mean [Upper Arm 





] 


 


Blood Pressure Sitting





Position [Upper 





Arm] 


 


Blood Pressure 143 H





Systolic [Upper 





Arm] 


 


O2 Sat by Pulse 100





Oximetry 


 


Oxygen Delivery Room Air





Method ( 





includes room 





air) 





HEAD: Is atraumatic normocephalic.  EYES: Pupils are equal round regular 

reactive to light accommodation.  Extraocular movements are normal.  There is no

conjunctival pallor.  There is no scleral icterus.  EARS: Tympanic membranes are

intact.  External auditory canals are clear.  NOSE: There is no deviated nasal 

septum.  There is no inflammation of the nasal mucous membrane.  MOUTH: Mucous 

membranes of mouth are moist.  Tongue is moist.  There is no ulcers.  There is 

no bleeding from the gums.  THROAT: There is no redness of the oropharynx.  

There is no exudates.  SKIN: There is no skin rashes.  There is no petechia or 

ecchymosis.  There is no skin lesions.  NECK: Is supple.  There is no JVD.  

Carotids are equal there is no bruit.  There is no lymphadenopathy.  There is no

goiter.  There is no accessory muscle respiration use.  Trachea central.  LUNGS:

Is clear to auscultation percussion.  HEART: S1-S2 is heard.  There is no S4 

gallop there is no S3 gallop.  There is systolic murmur left sternal border and 

the apex without radiation.  THERE is no rub.  ABDOMEN: Is obese.  Nontender 

there is no hepatosplenomegaly.  Bowel sounds are well heard.  Bowel sounds are 

normal.  There is no tender areas of.  EXTREMITIES: Femorals are deep.  There is

no femoral bruits.  Leg pulses are well felt.  There is no pedal edema.  There 

is no DVT cellulitis.  There is no calf tenderness.  There is early gangrenous 

changes of the left second toe.  There is wet gangrene with infection of the 

left fifth toe CNS: The patient is conscious awake alert oriented x3 with no 

focal deficits.  PSYCHIATRIC: The patient judgment site are intact her affect is

normal.





EKG: Sinus rhythm within normal limits.





ECHOCARDIOGRAM: Normal left ventricle chamber size.  There is mild LVH.  Normal 

left ventricular diastolic function.  There is no regional or focal wall motion 

abnormality.  LV ejection fraction is normal.  There is trace mitral 

regurgitation and trace tricuspid regurgitation.  There is no mitral stenosis or

mitral prolapse.  There is no aortic stenosis or aortic regurgitation.  The 

tricuspid regurgitant jet is not enough to estimate right ventricle systolic 

pressure.  There is no pericardial effusion.


                               Labs- Entire Visit











  09/23/20 09/23/20 09/23/20





  12:25 12:25 12:25


 


WBC  11.7 H  


 


RBC  4.73  


 


Hgb  12.1  


 


Hct  34.5 L  


 


MCV  73 L  


 


MCH  25.6 L  


 


MCHC  35.2  


 


RDW  17.0 H  


 


Plt Count  281  


 


Lymph % (Auto)  11.2 L  


 


Mono % (Auto)  6.8  


 


Eos % (Auto)  0.2  


 


Baso % (Auto)  0.6  


 


Absolute Neuts (auto)  9.5 H  


 


Absolute Lymphs (auto)  1.3  


 


Absolute Monos (auto)  0.8  


 


Absolute Eos (auto)  0.0  


 


Absolute Basos (auto)  0.1  


 


Seg Neutrophils %  81.2 H  


 


Sodium   132.6 L 


 


Potassium   3.9 


 


Chloride   97 L 


 


Carbon Dioxide   27 


 


Anion Gap   9 


 


BUN   7 


 


Creatinine   0.59 


 


Est GFR ( Amer)   > 60 


 


Est GFR (MDRD) Non-Af   > 60 


 


Glucose   205 H 


 


POC Glucose   


 


Hemoglobin A1c %   


 


Lactic Acid    1.9


 


Calcium   9.2 


 


Total Bilirubin   0.7 


 


Direct Bilirubin   0.4 


 


Neonat Total Bilirubin   Not Reportable 


 


Neonat Direct Bilirubin   Not Reportable 


 


Neonat Indirect Bili   Not Reportable 


 


AST   15 


 


ALT   13 


 


Alkaline Phosphatase   78 


 


Total Protein   7.8 


 


Albumin   3.8 


 


Triglycerides   


 


Cholesterol   


 


LDL Cholesterol Direct   


 


VLDL Cholesterol   


 


HDL Cholesterol   


 


Urine Color   


 


Urine Appearance   


 


Urine pH   


 


Ur Specific Gravity   


 


Urine Protein   


 


Urine Glucose (UA)   


 


Urine Ketones   


 


Urine Blood   


 


Urine Nitrite   


 


Urine Bilirubin   


 


Urine Urobilinogen   


 


Ur Leukocyte Esterase   


 


Urine WBC (Auto)   


 


Urine RBC (Auto)   


 


Squamous Epi Cells Auto   


 


Urine Mucus (Auto)   


 


Urine Ascorbic Acid   


 


Urine HCG, Qual   


 


SARS-CoV-2 (PCR)   














  09/23/20 09/24/20 09/24/20





  17:30 06:04 06:04


 


WBC   8.6 


 


RBC   4.38 


 


Hgb   11.0 L 


 


Hct   32.1 L 


 


MCV   73 L 


 


MCH   25.1 L 


 


MCHC   34.2 


 


RDW   16.9 H 


 


Plt Count   271 


 


Lymph % (Auto)   20.8 


 


Mono % (Auto)   10.6 


 


Eos % (Auto)   1.1 


 


Baso % (Auto)   0.5 


 


Absolute Neuts (auto)   5.7 


 


Absolute Lymphs (auto)   1.8 


 


Absolute Monos (auto)   0.9 


 


Absolute Eos (auto)   0.1 


 


Absolute Basos (auto)   0.0 


 


Seg Neutrophils %   67.0 


 


Sodium    135.6 L


 


Potassium    4.0


 


Chloride    103


 


Carbon Dioxide    25


 


Anion Gap    8


 


BUN    7


 


Creatinine    0.66


 


Est GFR ( Amer)    > 60


 


Est GFR (MDRD) Non-Af    > 60


 


Glucose    248 H


 


POC Glucose   


 


Hemoglobin A1c %   


 


Lactic Acid   


 


Calcium    8.6


 


Total Bilirubin    0.8


 


Direct Bilirubin    0.4


 


Neonat Total Bilirubin    Not Reportable


 


Neonat Direct Bilirubin    Not Reportable


 


Neonat Indirect Bili    Not Reportable


 


AST    14


 


ALT    9


 


Alkaline Phosphatase    71


 


Total Protein    6.8


 


Albumin    3.2 L


 


Triglycerides    149


 


Cholesterol    138.83


 


LDL Cholesterol Direct    81


 


VLDL Cholesterol    30.0


 


HDL Cholesterol    30 L


 


Urine Color  YELLOW  


 


Urine Appearance  CLEAR  


 


Urine pH  6.0  


 


Ur Specific Gravity  1.014  


 


Urine Protein  NEGATIVE  


 


Urine Glucose (UA)  150 H  


 


Urine Ketones  NEGATIVE  


 


Urine Blood  NEGATIVE  


 


Urine Nitrite  NEGATIVE  


 


Urine Bilirubin  NEGATIVE  


 


Urine Urobilinogen  2.0 H  


 


Ur Leukocyte Esterase  NEGATIVE  


 


Urine WBC (Auto)  0  


 


Urine RBC (Auto)  1  


 


Squamous Epi Cells Auto  1  


 


Urine Mucus (Auto)  RARE  


 


Urine Ascorbic Acid  NEGATIVE  


 


Urine HCG, Qual  NEGATIVE  


 


SARS-CoV-2 (PCR)   














  09/24/20 09/24/20 09/24/20





  06:04 07:26 11:34


 


WBC   


 


RBC   


 


Hgb   


 


Hct   


 


MCV   


 


MCH   


 


MCHC   


 


RDW   


 


Plt Count   


 


Lymph % (Auto)   


 


Mono % (Auto)   


 


Eos % (Auto)   


 


Baso % (Auto)   


 


Absolute Neuts (auto)   


 


Absolute Lymphs (auto)   


 


Absolute Monos (auto)   


 


Absolute Eos (auto)   


 


Absolute Basos (auto)   


 


Seg Neutrophils %   


 


Sodium   


 


Potassium   


 


Chloride   


 


Carbon Dioxide   


 


Anion Gap   


 


BUN   


 


Creatinine   


 


Est GFR ( Amer)   


 


Est GFR (MDRD) Non-Af   


 


Glucose   


 


POC Glucose   288 H  267 H


 


Hemoglobin A1c %  10.7 H  


 


Lactic Acid   


 


Calcium   


 


Total Bilirubin   


 


Direct Bilirubin   


 


Neonat Total Bilirubin   


 


Neonat Direct Bilirubin   


 


Neonat Indirect Bili   


 


AST   


 


ALT   


 


Alkaline Phosphatase   


 


Total Protein   


 


Albumin   


 


Triglycerides   


 


Cholesterol   


 


LDL Cholesterol Direct   


 


VLDL Cholesterol   


 


HDL Cholesterol   


 


Urine Color   


 


Urine Appearance   


 


Urine pH   


 


Ur Specific Gravity   


 


Urine Protein   


 


Urine Glucose (UA)   


 


Urine Ketones   


 


Urine Blood   


 


Urine Nitrite   


 


Urine Bilirubin   


 


Urine Urobilinogen   


 


Ur Leukocyte Esterase   


 


Urine WBC (Auto)   


 


Urine RBC (Auto)   


 


Squamous Epi Cells Auto   


 


Urine Mucus (Auto)   


 


Urine Ascorbic Acid   


 


Urine HCG, Qual   


 


SARS-CoV-2 (PCR)   














  09/24/20 09/24/20 09/24/20





  15:22 17:00 21:37


 


WBC   


 


RBC   


 


Hgb   


 


Hct   


 


MCV   


 


MCH   


 


MCHC   


 


RDW   


 


Plt Count   


 


Lymph % (Auto)   


 


Mono % (Auto)   


 


Eos % (Auto)   


 


Baso % (Auto)   


 


Absolute Neuts (auto)   


 


Absolute Lymphs (auto)   


 


Absolute Monos (auto)   


 


Absolute Eos (auto)   


 


Absolute Basos (auto)   


 


Seg Neutrophils %   


 


Sodium   


 


Potassium   


 


Chloride   


 


Carbon Dioxide   


 


Anion Gap   


 


BUN   


 


Creatinine   


 


Est GFR ( Amer)   


 


Est GFR (MDRD) Non-Af   


 


Glucose   


 


POC Glucose   169 H  202 H


 


Hemoglobin A1c %   


 


Lactic Acid   


 


Calcium   


 


Total Bilirubin   


 


Direct Bilirubin   


 


Neonat Total Bilirubin   


 


Neonat Direct Bilirubin   


 


Neonat Indirect Bili   


 


AST   


 


ALT   


 


Alkaline Phosphatase   


 


Total Protein   


 


Albumin   


 


Triglycerides   


 


Cholesterol   


 


LDL Cholesterol Direct   


 


VLDL Cholesterol   


 


HDL Cholesterol   


 


Urine Color   


 


Urine Appearance   


 


Urine pH   


 


Ur Specific Gravity   


 


Urine Protein   


 


Urine Glucose (UA)   


 


Urine Ketones   


 


Urine Blood   


 


Urine Nitrite   


 


Urine Bilirubin   


 


Urine Urobilinogen   


 


Ur Leukocyte Esterase   


 


Urine WBC (Auto)   


 


Urine RBC (Auto)   


 


Squamous Epi Cells Auto   


 


Urine Mucus (Auto)   


 


Urine Ascorbic Acid   


 


Urine HCG, Qual   


 


SARS-CoV-2 (PCR)  NEGATIVE  








                                        





Foot X-Ray  09/23/20 12:12


IMPRESSION:  Negative exam.  No conventional radiographic evidence of 

osteomyelitis.


 





IMPRESSION/RECOMMENDATION:





1.  Diabetic ulcer with gangrene of the left fifth toe and left second toe.  For

amputation of the left fifth toe.


2.  Hypertension: Patient's blood pressure reasonably controlled continue 

current antihypertensive.


3.  Diabetes mellitus type 2 insulin-dependent.  Continue current antidiabetic 

treatment and Accu-Cheks regularly.


4.  Systolic murmur most likely flow murmur no valvular lesions by 

echocardiography.


5.  Clinically and by history no evidence of coronary artery disease.  


6.  Preoperative cardiac risk assessment work.





MY RECOMMENDATION/OPINION IS THAT THIS PATIENT WILL BE LOW  CARDIAC RISK FOR 

THIS SURGICAL PROCEDURE.





Medications reviewed.  Medical regiment and management plan discussed with Dr. Ricci.  Medical decision making is of high complexity.  60 minutes spent with

patient with more than 50% of time spent in direct patient care.  Will follow.

## 2020-09-24 NOTE — XCELERA REPORT
[address] [address]

                               Tel: [phone]

                               Fax: [phone]



                      Transthoracic Echocardiogram Report

_______________________________________________________________________________



Name: CHEPE SCHMITT

MRN: [id number]                           Age: 35 yrs

Gender: Female                            : 1985

Patient Status: Inpatient                 Patient Location: [address] [place] [address]

Account #: [id number]

Study Date: 2020 08:01 PM

Accession #: [id number]

_______________________________________________________________________________



Height: 71 in        Weight: 354 lb        BSA: 2.7 m2

_______________________________________________________________________________

Procedure: A two-dimensional transthoracic echocardiogram with color flow and

Doppler was performed. Study Quality: Good.

Reason For Study: Left foot cellulitis with toe gangrene, DMT2, HTN



History: MURMUR / HTN / PRE_OP( Indication Discussed with ).

Ordering Physician: ERIKA CHILD



Performed By: Kacy Barrios

_______________________________________________________________________________



Interpretation Summary

The left ventricle is normal in size.

There is mild concentric left ventricular hypertrophy.

Left ventricular systolic function is normal.

LV EF is 65%

Doppler measurements suggest normal left ventricular diastolic function

The left ventricular wall motion is normal.

There is no thrombus.

No ASD,VSD,or PFO seen.

The right ventricle is normal in size and function.

The right atrium is normal.

The left atrial size is normal.

There is no evidence of mitral valve prolapse.

There is no vegetation seen on the mitral valve.

There is no mitral valve stenosis.

There is a trace amount of mitral regurgitation

There is no aortic valvular vegetation.

There is no aortic valve stenosis

There is no LVOT obstruction.

No aortic regurgitation is present.

There is no tricuspid stenosis.

There is a trace amount of tricuspid regurgitation

Tricuspid regurgitation jet envelope not well defined to measure RV systolic

pressure accurately.

There is no pulmonic valvular stenosis.

There is no pulmonic valvular regurgitation.

The aortic root is normal size.

The inferior vena cava appeared normal and decreased > 50% with respiration

(RAP 5-10 mmHg)

There is no pericardial effusion.



MMode/2D Measurements & Calculations

RVDd: 3.2 cm  LVIDd: 5.1 cm   FS: 38.6 %             Ao root diam: 3.4 cm



IVSd: 1.2 cm  LVIDs: 3.1 cm   EDV(Teich): 124.0 ml   Ao root area: 9.0 cm2

              LVPWd: 1.2 cm   ESV(Teich): 38.9 ml    LA dimension: 2.9 cm

                              EF(Teich): 68.6 %



Doppler Measurements & Calculations

MV E max jerad:       MV P1/2t max jerad:     Ao V2 max:        LV V1 max P.3 cm/sec        145.4 cm/sec          182.6 cm/sec      7.1 mmHg

MV A max jerad:       MV P1/2t: 78.4 msec   Ao max PG:        LV V1 max:

93.8 cm/sec         MVA(P1/2t): 2.8 cm2   13.3 mmHg         132.8 cm/sec

MV E/A: 1.4         MV dec slope:



                    543.2 cm/sec2

                    MV dec time: 0.23 sec

        _______________________________________________________________

PA V2 max:          MV P1/2t-pr_phl:

89.8 cm/sec         78.4 msec

PA max PG: 3.2 mmHg





Left Ventricle

The left ventricle is normal in size. There is mild concentric left

ventricular hypertrophy. Left ventricular systolic function is normal. LV EF

is 65%. Doppler measurements suggest normal left ventricular diastolic

function. The left ventricular wall motion is normal. There is no thrombus. No

ASD,VSD,or PFO seen.



Right Ventricle

The right ventricle is normal in size and function.



Atria

The right atrium is normal. The left atrial size is normal.



Mitral Valve

There is no evidence of mitral valve prolapse. There is no vegetation seen on

the mitral valve. There is no mitral valve stenosis. There is a trace amount

of mitral regurgitation.





Aortic Valve

There is no aortic valvular vegetation. There is no aortic valve stenosis.

There is no LVOT obstruction. No aortic regurgitation is present.



Tricuspid Valve

There is no tricuspid stenosis. There is a trace amount of tricuspid

regurgitation. Tricuspid regurgitation jet envelope not well defined to

measure RV systolic pressure accurately.



Pulmonic Valve

There is no pulmonic valvular stenosis. There is no pulmonic valvular

regurgitation.



Great Vessels

The aortic root is normal size. The inferior vena cava appeared normal and

decreased > 50% with respiration (RAP 5-10 mmHg).



Effusions

There is no pericardial effusion.





_______________________________________________________________________________

_______________________________________________________________________________



Electronically signed by:      Saumya Duke      on 2020 09:49 PM



CC: ERIKA CHILD, Saumya

## 2020-09-25 LAB — VANCOMYCIN,TROUGH: 11.5 UG/ML (ref 5–20)

## 2020-09-25 PROCEDURE — 0Y6Y0Z0 DETACHMENT AT LEFT 5TH TOE, COMPLETE, OPEN APPROACH: ICD-10-PCS | Performed by: SURGERY

## 2020-09-25 RX ADMIN — PROBIOTIC PRODUCT - TAB SCH: TAB at 10:45

## 2020-09-25 RX ADMIN — PROBIOTIC PRODUCT - TAB SCH MG: TAB at 17:46

## 2020-09-25 RX ADMIN — INSULIN LISPRO SCH UNIT: 100 INJECTION, SOLUTION INTRAVENOUS; SUBCUTANEOUS at 16:57

## 2020-09-25 RX ADMIN — INSULIN LISPRO SCH UNIT: 100 INJECTION, SOLUTION INTRAVENOUS; SUBCUTANEOUS at 22:14

## 2020-09-25 RX ADMIN — INSULIN HUMAN SCH UNIT: 100 INJECTION, SUSPENSION SUBCUTANEOUS at 17:46

## 2020-09-25 RX ADMIN — VANCOMYCIN HYDROCHLORIDE SCH MLS/HR: 1 INJECTION, POWDER, LYOPHILIZED, FOR SOLUTION INTRAVENOUS at 14:25

## 2020-09-25 RX ADMIN — VANCOMYCIN HYDROCHLORIDE SCH MLS/HR: 1 INJECTION, POWDER, LYOPHILIZED, FOR SOLUTION INTRAVENOUS at 05:10

## 2020-09-25 RX ADMIN — ENOXAPARIN SODIUM SCH: 40 INJECTION SUBCUTANEOUS at 10:47

## 2020-09-25 RX ADMIN — PIPERACILLIN AND TAZOBACTAM SCH MLS/HR: 3; .375 INJECTION, POWDER, LYOPHILIZED, FOR SOLUTION INTRAVENOUS; PARENTERAL at 03:00

## 2020-09-25 RX ADMIN — INSULIN LISPRO SCH: 100 INJECTION, SOLUTION INTRAVENOUS; SUBCUTANEOUS at 14:01

## 2020-09-25 RX ADMIN — PIPERACILLIN AND TAZOBACTAM SCH MLS/HR: 3; .375 INJECTION, POWDER, LYOPHILIZED, FOR SOLUTION INTRAVENOUS; PARENTERAL at 21:18

## 2020-09-25 RX ADMIN — PANTOPRAZOLE SODIUM SCH: 40 TABLET, DELAYED RELEASE ORAL at 05:09

## 2020-09-25 RX ADMIN — LOSARTAN POTASSIUM SCH: 50 TABLET, FILM COATED ORAL at 10:46

## 2020-09-25 RX ADMIN — METFORMIN HYDROCHLORIDE SCH MG: 500 TABLET, FILM COATED ORAL at 16:57

## 2020-09-25 RX ADMIN — PIPERACILLIN AND TAZOBACTAM SCH MLS/HR: 3; .375 INJECTION, POWDER, LYOPHILIZED, FOR SOLUTION INTRAVENOUS; PARENTERAL at 16:57

## 2020-09-25 RX ADMIN — AMLODIPINE BESYLATE SCH MG: 10 TABLET ORAL at 10:51

## 2020-09-25 RX ADMIN — INSULIN LISPRO SCH: 100 INJECTION, SOLUTION INTRAVENOUS; SUBCUTANEOUS at 10:43

## 2020-09-25 RX ADMIN — HYDROCHLOROTHIAZIDE SCH MG: 12.5 CAPSULE ORAL at 10:51

## 2020-09-25 RX ADMIN — INSULIN HUMAN SCH: 100 INJECTION, SUSPENSION SUBCUTANEOUS at 10:55

## 2020-09-25 RX ADMIN — METFORMIN HYDROCHLORIDE SCH: 500 TABLET, FILM COATED ORAL at 10:43

## 2020-09-25 RX ADMIN — VANCOMYCIN HYDROCHLORIDE SCH MLS/HR: 1 INJECTION, POWDER, LYOPHILIZED, FOR SOLUTION INTRAVENOUS at 22:15

## 2020-09-25 RX ADMIN — SODIUM CHLORIDE PRN MLS/HR: 9 INJECTION, SOLUTION INTRAVENOUS at 10:51

## 2020-09-25 RX ADMIN — FERROUS SULFATE TAB 325 MG (65 MG ELEMENTAL FE) SCH: 325 (65 FE) TAB at 10:46

## 2020-09-25 RX ADMIN — PIPERACILLIN AND TAZOBACTAM SCH MLS/HR: 3; .375 INJECTION, POWDER, LYOPHILIZED, FOR SOLUTION INTRAVENOUS; PARENTERAL at 10:54

## 2020-09-25 NOTE — PDOC PROGRESS REPORT
Subjective


Progress Note for:: 09/25/20


Subjective:: 





35-year-old female with a severe diabetic foot infection and wet gangrene of the

left fifth toe.  She reports erythema extending up the dorsum of the foot.  She 

reports left foot pain, malaise, and fatigue.  She denies chest pain, shortness 

of breath, dizziness, orthostasis, nausea, vomiting, abdominal pain, headache.


Reason For Visit: 


DIABETIC ULCER OF TOE/CELLULITIS








Physical Exam


Vital Signs: 


                                        











Temp Pulse Resp BP Pulse Ox


 


 98.7 F   81   16   98/71 L  99 


 


 09/25/20 16:34  09/25/20 16:34  09/25/20 14:03  09/25/20 16:34  09/25/20 16:34








                                 Intake & Output











 09/24/20 09/25/20 09/26/20





 06:59 06:59 06:59


 


Intake Total 2000 2962 990


 


Balance 2000 2962 990


 


Weight 161 kg 155.4 kg 











General appearance: PRESENT: no acute distress, morbidly obese


Head exam: PRESENT: atraumatic, normocephalic


Eye exam: PRESENT: EOMI, PERRLA.  ABSENT: scleral icterus


Mouth exam: PRESENT: moist, neck supple


Neck exam: ABSENT: meningismus, tenderness, thyromegaly, tracheal deviation


Cardiovascular exam: ABSENT: tachycardia


GI/Abdominal exam: PRESENT: soft.  ABSENT: tenderness


Rectal exam: PRESENT: deferred


Extremities exam: PRESENT: other - Wet gangrene of the left fifth toe.  It 

extends to the base of the toe, with erythema extending up the dorsum of the 

foot.


Neurological exam: PRESENT: alert, awake, oriented to person, oriented to place,

oriented to time, oriented to situation


Psychiatric exam: ABSENT: agitated, anxious, depressed


Focused psych exam: ABSENT: delusional


Skin exam: PRESENT: erythema - See extremity exam.  ABSENT: jaundice





Results


Laboratory Results: 


                                        





                                 09/24/20 06:04 





                                 09/24/20 06:04 





                                        





09/23/20 18:59   Foot - Left   Gram Stain - Final


09/23/20 18:59   Foot - Left   Wound Culture - Final


                            Group B Beta Streptococcus


                            Skin Melody








Impressions: 


                                        





Foot X-Ray  09/23/20 12:12


IMPRESSION:  Negative exam.  No conventional radiographic evidence of 

osteomyelitis.


 














Assessment & Plan





- Diagnosis


(1) Gangrene of left foot


Is this a current diagnosis for this admission?: Yes   





- Time


Anticipated Discharge Disposition: unknwon


Anticipated Discharge Timeframe: unknown





- Plan Summary


Plan Summary: 





This is a 35-year-old female with wet gangrene of the left foot.  She has 

obvious necrosis of the left fifth toe, with erythema extending up the dorsum of

the foot.  Plan for amputation of the left fifth toe today, with drainage of any

purulent material of the foot.  The patient also has an eschar/callus of the 

left second toe.  At this time I do not identify any obvious infection.  I have 

discussed foot care with her, and the patient does not desire any other 

amputations (except for the left fifth toe) at this time.  Continue to monitor 

the left second toe very closely.  If at any time it appears compromised, she 

may require amputation of this toe.

## 2020-09-25 NOTE — PROGRESS NOTE
Provider Note


Provider Note: 





CARDIOLOGY PROGRESS NOTE by Dr. Saumya Duke on 9/25/2020.





SUBJECTIVE: The patient had amputation of her toes.  She is stable and without 

any chest pain or discomfort.  There is no shortness of breath there is no PND 

orthopnea or leg edema.  There is no arrhythmias seen on the monitor.





PHYSICAL EXAMINATION: The patient morbidly obese.  In no acute distress


                                                                Selected Entries











  09/25/20 09/25/20





  14:03 16:34


 


Temperature 97.8 F 


 


Pulse Rate 79 


 


Respiratory 16 





Rate  


 


Blood Pressure 130/84 H 


 


O2 Sat by Pulse 100 





Oximetry  


 


Oxygen Delivery  Room Air





Method  





HEAD: Is atraumatic normocephalic.  EYES: Pupils are equal round regular 

reactive to light accommodation.  Extraocular movements are normal.  There is no

conjunctival pallor.  There is no scleral icterus.  EARS: Tympanic membranes are

intact.  External auditory canals are clear.  NOSE: There is no deviated nasal s

eptum.  There is no inflammation of the nasal mucous membrane.  MOUTH: Mucous 

membranes of mouth are moist.  Tongue is moist.  There is no ulcers.  There is 

no bleeding from the gums.  THROAT: There is no redness of the oropharynx.  

There is no exudates.  SKIN: There is no skin rashes.  There is no petechia or 

ecchymosis.  There is no skin lesions.  NECK: Is supple.  There is no JVD.  

Carotids are equal there is no bruit.  There is no lymphadenopathy.  There is no

goiter.  There is no accessory muscle respiration use.  Trachea central.  LUNGS:

Is clear to auscultation percussion.  HEART: S1-S2 is heard.  There is no S4 

gallop there is no S3 gallop.  There is systolic murmur left sternal border and 

the apex without radiation.  THERE is no rub.  ABDOMEN: Is obese.  Nontender 

there is no hepatosplenomegaly.  Bowel sounds are well heard.  Bowel sounds are 

normal.  There is no tender areas of.  EXTREMITIES: Femorals are deep.  There is

no femoral bruits.  Leg pulses are well felt.  There is no pedal edema.  There 

is no DVT cellulitis.  There is no calf tenderness.  There is dressing in the 

left foot which is clean and dry.  CNS: The patient is conscious awake alert 

oriented x3 with no focal deficits.  PSYCHIATRIC: The patient judgment site are 

intact her affect is normal.


                              Labs- All tests 24 hr











  09/25/20 09/25/20 09/25/20





  16:46 21:16 21:16


 


Creatinine   0.70 


 


Est GFR ( Amer)   > 60 


 


Est GFR (MDRD) Non-Af   > 60 


 


POC Glucose  229 H  


 


Time Trough Drawn    2116


 


Vancomycin Trough    11.5














  09/25/20





  21:27


 


Creatinine 


 


Est GFR ( Amer) 


 


Est GFR (MDRD) Non-Af 


 


POC Glucose  204 H


 


Time Trough Drawn 


 


Vancomycin Trough 








                                        





Foot X-Ray  09/23/20 12:12


IMPRESSION:  Negative exam.  No conventional radiographic evidence of 

osteomyelitis.


 








Cardiac status is stable.


IMPRESSION/RECOMMENDATION:





1.  Diabetic ulcer with gangrene of the left fifth toe and left second toe.  

Status post amputation of the second and fifth left toes.  Postop patient stable


2.  Hypertension: Patient's blood pressure reasonably controlled continue 

current antihypertensive.


3.  Diabetes mellitus type 2 insulin-dependent.  Continue current antidiabetic 

treatment and Accu-Cheks regularly.


4.  Systolic murmur most likely flow murmur no valvular lesions by 

echocardiograph.


5.  Although clinicallyCAD or by history, patient's risk factor for coronary 

artery disease are namely age, hypertension, diabetes mellitus, and tobacco 

abuse disorder hence later would recommend patient have an IV Lexiscan C

ardiolite stress test.  This can be done as an outpatient if the patient so 

desires.





Medications reviewed.  Medical regimen and management plan discussed with the 

attending provider on the case.  Medical decision making is of moderate 

complexity.  Cardiac status is stable.  We will sign off.  40 minutes spent as 

patient more than 50% of time spent in direct patient care.  If the patient so 

desires she can follow-up with me in the office.

## 2020-09-25 NOTE — PDOC PROGRESS REPORT
Subjective


Progress Note for:: 09/25/20


Subjective:: 





Patient denied any chest pain or difficulty with breathing. No nausea, vomiting,

or abdominal pain. No fever or chills. She is post left foot 2nd and 5th toes 

amputation. Dressing is currently satisfactory.


Reason For Visit: 


DIABETIC ULCER OF TOE/CELLULITIS








Physical Exam


Vital Signs: 


                                        











Temp Pulse Resp BP Pulse Ox


 


 97.8 F   79   16   130/84 H  100 


 


 09/25/20 14:03  09/25/20 14:03  09/25/20 14:03  09/25/20 14:03  09/25/20 14:03








                                 Intake & Output











 09/24/20 09/25/20 09/26/20





 06:59 06:59 06:59


 


Intake Total 2000 2962 500


 


Balance 2000 2962 500


 


Weight 161 kg 155.4 kg 











Physical Exam: 


General appearance: PRESENT: appropriate in responses, morbidly obese


Head exam: PRESENT: atraumatic, normocephalic


Eye exam: PRESENT: conjunctiva pink.  ABSENT: pallor, scleral icterus


Mouth exam: PRESENT: moist


Respiratory exam: PRESENT: clear to auscultation kim


Cardiovascular exam: PRESENT: RRR, +S1, +S2.  ABSENT: diastolic murmur, rubs, 

systolic murmur


GI/Abdominal exam: PRESENT: normal bowel sounds, soft.  ABSENT: distended, 

guarding, mass, organomegaly, rebound, tenderness.


Extremities exam: ABSENT: pedal edema, s/p left 2nd and 5th toes amputation.


Neurological exam: PRESENT: alert, awake, oriented to person, oriented to place,

oriented to time, oriented to situation, CN II-XII grossly intact.  ABSENT: 

motor sensory deficit


Psychiatric exam: PRESENT: appropriate affect, normal mood.  ABSENT: homicidal 

ideation, suicidal ideation


Skin exam: PRESENT: dry, warm. Satisfactory left foot dressing.





Results


Laboratory Results: 


                                        





                                 09/24/20 06:04 





                                 09/24/20 06:04 





                                        





09/23/20 18:59   Foot - Left   Gram Stain - Final


09/23/20 18:59   Foot - Left   Wound Culture - Final


                            Group B Beta Streptococcus


                            Skin Melody








Impressions: 


                                        





Foot X-Ray  09/23/20 12:12


IMPRESSION:  Negative exam.  No conventional radiographic evidence of o

steomyelitis.


 














Assessment & Plan





- Diagnosis


(1) Cellulitis of foot


Is this a current diagnosis for this admission?: Yes   





(2) Diabetic ulcer of toe


Qualifiers: 


   Diabetes mellitus type: type 2   Laterality: left   Non-pressure ulcer stage:

with other severity   Qualified Code(s): E11.621 - Type 2 diabetes mellitus with

foot ulcer; L97.528 - Non-pressure chronic ulcer of other part of left foot with

other specified severity   


Is this a current diagnosis for this admission?: Yes   





(3) Uncontrolled stage 2 hypertension


Is this a current diagnosis for this admission?: Yes   





(4) Uncontrolled type 2 diabetes mellitus


Qualifiers: 


   Glycemic state: with hyperglycemia   Qualified Code(s): E11.65 - Type 2 

diabetes mellitus with hyperglycemia   


Is this a current diagnosis for this admission?: Yes   





(5) Morbid (severe) obesity due to excess calories


Is this a current diagnosis for this admission?: Yes   





- Time


Time Spent with patient: 25-34 minutes


Level of Care: IMCU


Medications reviewed and adjusted accordingly: Yes


Anticipated discharge: Home with Homehealth


Anticipated DC Timeframe: within 72 hours





- Inpatient Certification


Based on my medical assessment, after consideration of the patient's 

comorbidities, presenting symptoms, or acuity I expect that the services needed 

warrant INPATIENT care.: Yes


I certify that my determination is in accordance with my understanding of 

Medicare's requirements for reasonable and necessary INPATIENT services [42 CFR 

412.3e].: Yes


Medical Necessity: Significant Comorbidiites Make Outpatient Treatment Too 

Risky, Need Close Monitoring Due to Risk of Patient Decompensation, Need For IV 

Fluids, Need For Continuous Telemetry Monitoring, Need for IV Antibiotics, Need 

for Surgery, Risk of Complication if Not Cared For in Hospital, Risk of 

Diagnosis Which Will Require Inpatient Eval/Care/Monitoring


Post Hospital Care: D/C Planner Documentation





- Plan Summary


Plan Summary: 





Continue all current medication management. Wound care at the direction of the 

surgical team.

## 2020-09-25 NOTE — OPERATIVE REPORT
Nonrecallable Operative Report


DATE OF SURGERY: 09/25/20


PREOPERATIVE DIAGNOSIS: Wet gangrene of the left fifth toe


POSTOPERATIVE DIAGNOSIS: 1.  Same as above.  2.  Diabetic foot abscess, left.


OPERATION: Ray amputation of the left fifth toe, with drainage of accompanying 

left foot abscess


SURGEON: TEHE RODRIGUEZ


ANESTHESIA: LMAC


TISSUE REMOVED OR ALTERED: 1.  Left fifth toe and metatarsal head.  2.  Wound 

culture of abscess cavity


COMPLICATIONS: 





None apparent


ESTIMATED BLOOD LOSS: Minimal


PROCEDURE: 





Drains/implants: 4 x 4 gauze soaked in Kerlix.





Procedure in detail: After informed consent was obtained, the patient was 

brought to the operating room and laid in the supine position.  The area of the 

left foot was prepped and draped in a normal sterile fashion.  Local anesthesia 

was used to anesthetize the skin and soft tissues of the foot.  An incision was 

created around the left fifth toe.  The incision was carried proximally on the 

dorsal/lateral aspect of the foot over the metatarsal.  The toe was 

disarticulated sharply, with a scalpel.  It was then passed off the field.  As 

the incision was carried proximally, and an abscess cavity was identified on the

dorsum of the foot.  There was purulent material present.  This was cultured.  

Next, all nonviable tissue was debrided away sharply.  This included skin, fatty

tissue, and some muscle.  Once the tissue appeared healthy, the metatarsal head 

was divided using the large bone cutters.  It was passed off the field.  Next 2-

0 Vicryl suture was used to provide soft tissue coverage over the cut bone 

surface.  The wound was then irrigated, and packed with a 4 x 4 gauze soaked in 

Betadine.  The skin was left open.  A dressing was placed, and the procedure was

concluded.  All sponge, instrument, and needle counts were correct x2.





Condition: Fair.

## 2020-09-26 LAB
ADD MANUAL DIFF: NO
ANION GAP SERPL CALC-SCNC: 9 MMOL/L (ref 5–19)
BASOPHILS # BLD AUTO: 0 10^3/UL (ref 0–0.2)
BASOPHILS NFR BLD AUTO: 0.6 % (ref 0–2)
BUN SERPL-MCNC: 7 MG/DL (ref 7–20)
CALCIUM: 8.6 MG/DL (ref 8.4–10.2)
CHLORIDE SERPL-SCNC: 103 MMOL/L (ref 98–107)
CO2 SERPL-SCNC: 24 MMOL/L (ref 22–30)
EOSINOPHIL # BLD AUTO: 0.3 10^3/UL (ref 0–0.6)
EOSINOPHIL NFR BLD AUTO: 3.7 % (ref 0–6)
ERYTHROCYTE [DISTWIDTH] IN BLOOD BY AUTOMATED COUNT: 16.4 % (ref 11.5–14)
GLUCOSE SERPL-MCNC: 184 MG/DL (ref 75–110)
HCT VFR BLD CALC: 32 % (ref 36–47)
HGB BLD-MCNC: 10.7 G/DL (ref 12–15.5)
LYMPHOCYTES # BLD AUTO: 1.5 10^3/UL (ref 0.5–4.7)
LYMPHOCYTES NFR BLD AUTO: 20.2 % (ref 13–45)
MCH RBC QN AUTO: 24.5 PG (ref 27–33.4)
MCHC RBC AUTO-ENTMCNC: 33.5 G/DL (ref 32–36)
MCV RBC AUTO: 73 FL (ref 80–97)
MONOCYTES # BLD AUTO: 0.9 10^3/UL (ref 0.1–1.4)
MONOCYTES NFR BLD AUTO: 11.8 % (ref 3–13)
NEUTROPHILS # BLD AUTO: 4.8 10^3/UL (ref 1.7–8.2)
NEUTS SEG NFR BLD AUTO: 63.7 % (ref 42–78)
PLATELET # BLD: 303 10^3/UL (ref 150–450)
POTASSIUM SERPL-SCNC: 4.1 MMOL/L (ref 3.6–5)
RBC # BLD AUTO: 4.37 10^6/UL (ref 3.72–5.28)
TOTAL CELLS COUNTED % (AUTO): 100 %
WBC # BLD AUTO: 7.6 10^3/UL (ref 4–10.5)

## 2020-09-26 RX ADMIN — PROBIOTIC PRODUCT - TAB SCH MG: TAB at 18:35

## 2020-09-26 RX ADMIN — INSULIN LISPRO SCH UNIT: 100 INJECTION, SOLUTION INTRAVENOUS; SUBCUTANEOUS at 11:13

## 2020-09-26 RX ADMIN — INSULIN HUMAN SCH UNIT: 100 INJECTION, SUSPENSION SUBCUTANEOUS at 11:12

## 2020-09-26 RX ADMIN — PIPERACILLIN AND TAZOBACTAM SCH MLS/HR: 3; .375 INJECTION, POWDER, LYOPHILIZED, FOR SOLUTION INTRAVENOUS; PARENTERAL at 21:00

## 2020-09-26 RX ADMIN — INSULIN LISPRO SCH: 100 INJECTION, SOLUTION INTRAVENOUS; SUBCUTANEOUS at 12:11

## 2020-09-26 RX ADMIN — INSULIN LISPRO SCH UNIT: 100 INJECTION, SOLUTION INTRAVENOUS; SUBCUTANEOUS at 16:02

## 2020-09-26 RX ADMIN — INSULIN LISPRO SCH UNIT: 100 INJECTION, SOLUTION INTRAVENOUS; SUBCUTANEOUS at 22:11

## 2020-09-26 RX ADMIN — VANCOMYCIN HYDROCHLORIDE SCH MLS/HR: 1 INJECTION, POWDER, LYOPHILIZED, FOR SOLUTION INTRAVENOUS at 05:50

## 2020-09-26 RX ADMIN — METFORMIN HYDROCHLORIDE SCH MG: 500 TABLET, FILM COATED ORAL at 16:02

## 2020-09-26 RX ADMIN — AMLODIPINE BESYLATE SCH MG: 10 TABLET ORAL at 11:11

## 2020-09-26 RX ADMIN — HYDROCHLOROTHIAZIDE SCH MG: 12.5 CAPSULE ORAL at 11:11

## 2020-09-26 RX ADMIN — PIPERACILLIN AND TAZOBACTAM SCH MLS/HR: 3; .375 INJECTION, POWDER, LYOPHILIZED, FOR SOLUTION INTRAVENOUS; PARENTERAL at 10:58

## 2020-09-26 RX ADMIN — ENOXAPARIN SODIUM SCH MG: 40 INJECTION SUBCUTANEOUS at 11:14

## 2020-09-26 RX ADMIN — SODIUM CHLORIDE PRN MLS/HR: 9 INJECTION, SOLUTION INTRAVENOUS at 14:04

## 2020-09-26 RX ADMIN — PROBIOTIC PRODUCT - TAB SCH MG: TAB at 11:09

## 2020-09-26 RX ADMIN — INSULIN HUMAN SCH UNIT: 100 INJECTION, SUSPENSION SUBCUTANEOUS at 18:35

## 2020-09-26 RX ADMIN — METFORMIN HYDROCHLORIDE SCH MG: 500 TABLET, FILM COATED ORAL at 11:09

## 2020-09-26 RX ADMIN — FERROUS SULFATE TAB 325 MG (65 MG ELEMENTAL FE) SCH MG: 325 (65 FE) TAB at 11:11

## 2020-09-26 RX ADMIN — PIPERACILLIN AND TAZOBACTAM SCH MLS/HR: 3; .375 INJECTION, POWDER, LYOPHILIZED, FOR SOLUTION INTRAVENOUS; PARENTERAL at 15:53

## 2020-09-26 RX ADMIN — LOSARTAN POTASSIUM SCH MG: 50 TABLET, FILM COATED ORAL at 11:09

## 2020-09-26 RX ADMIN — PANTOPRAZOLE SODIUM SCH MG: 40 TABLET, DELAYED RELEASE ORAL at 05:50

## 2020-09-26 RX ADMIN — PIPERACILLIN AND TAZOBACTAM SCH MLS/HR: 3; .375 INJECTION, POWDER, LYOPHILIZED, FOR SOLUTION INTRAVENOUS; PARENTERAL at 03:00

## 2020-09-26 RX ADMIN — TRAMADOL HYDROCHLORIDE PRN MG: 50 TABLET, FILM COATED ORAL at 11:10

## 2020-09-26 RX ADMIN — VANCOMYCIN HYDROCHLORIDE SCH MLS/HR: 1 INJECTION, POWDER, LYOPHILIZED, FOR SOLUTION INTRAVENOUS at 22:12

## 2020-09-26 RX ADMIN — VANCOMYCIN HYDROCHLORIDE SCH MLS/HR: 1 INJECTION, POWDER, LYOPHILIZED, FOR SOLUTION INTRAVENOUS at 14:04

## 2020-09-26 NOTE — PDOC PROGRESS REPORT
Subjective


Progress Note for:: 09/26/20


Reason For Visit: 


DIABETIC ULCER OF TOE/CELLULITIS


Patient has no complaints, less pain in her foot, and reports less swelling





Physical Exam


Vital Signs: 


                                        











Temp Pulse Resp BP Pulse Ox


 


 98.3 F   78   18   140/73 H  99 


 


 09/26/20 07:40  09/26/20 07:40  09/26/20 07:40  09/26/20 07:40  09/26/20 07:40








                                 Intake & Output











 09/25/20 09/26/20 09/27/20





 06:59 06:59 06:59


 


Intake Total 2962 1640 250


 


Balance 2962 1640 250


 


Weight 155.4 kg 161.4 kg 161.4 kg











General appearance: PRESENT: no acute distress


Musculoskeletal exam: PRESENT: other - Left foot examined, dressings removed.  

Bounding dorsalis pedis pulse.  Packing removed from left fifth ray amputation 

site.  Wound cavity clean, no foul smell; no pus expressed from the dorsum of 

the foot; left second toe dry, with bruised skin and eschar medially; toe 

appears viable





Results


Laboratory Results: 


                                        





                                 09/26/20 05:33 





                                 09/26/20 05:33 





                                        











  09/25/20 09/26/20 09/26/20





  21:16 05:33 05:33


 


WBC   7.6 


 


RBC   4.37 


 


Hgb   10.7 L 


 


Hct   32.0 L 


 


MCV   73 L 


 


MCH   24.5 L 


 


MCHC   33.5 


 


RDW   16.4 H 


 


Plt Count   303 


 


Seg Neutrophils %   63.7 


 


Sodium    136.3 L


 


Potassium    4.1


 


Chloride    103


 


Carbon Dioxide    24


 


Anion Gap    9


 


BUN    7


 


Creatinine  0.70   0.72


 


Est GFR ( Amer)  > 60   > 60


 


Glucose    184 H


 


Calcium    8.6








                                        





09/23/20 18:59   Foot - Left   Gram Stain - Final


09/23/20 18:59   Foot - Left   Wound Culture - Final


                            Group B Beta Streptococcus


                            Skin Melody








Impressions: 


                                        





Foot X-Ray  09/23/20 12:12


IMPRESSION:  Negative exam.  No conventional radiographic evidence of 

osteomyelitis.


 














Assessment & Plan





- Diagnosis


(1) Hyperglycemia


Is this a current diagnosis for this admission?: Yes   


Plan: 


Impression: Patient is postoperative day 1 status post left fifth ray 

amputation, wound left open, foot sepsis stabilizing with no indication of 

infection progression; no indication for further debridement today





Plan:





1.  Wound redressed today; patient may be up ambulating with surgical sandal





2.  Left second toe appears viable at this time.





3.  Will reexamine left foot and wound tomorrow morning.  The above discussed 

with nursing staff











- Time


Time Spent: 30 to 50 Minutes


Critical Time spent with patient: Less than 15 minutes


Anticipated Discharge Disposition: Home, Self Care


Anticipated Discharge Timeframe: within 48 hours

## 2020-09-26 NOTE — PDOC PROGRESS REPORT
Subjective


Progress Note for:: 09/26/20


Subjective:: 





Patient seen by the bedside, status post amputation of toes of the left foot, on

IV antibiotic


Reason For Visit: 


DIABETIC ULCER OF TOE/CELLULITIS








Physical Exam


Vital Signs: 


                                        











Temp Pulse Resp BP Pulse Ox


 


 98.2 F   78   19   154/87 H  100 


 


 09/26/20 11:38  09/26/20 11:38  09/26/20 11:38  09/26/20 11:38  09/26/20 11:38








                                 Intake & Output











 09/25/20 09/26/20 09/27/20





 06:59 06:59 06:59


 


Intake Total 2962 2640 750


 


Balance 2962 2640 750


 


Weight 155.4 kg 161.4 kg 161.4 kg











General appearance: PRESENT: obese


Eye exam: PRESENT: PERRLA


Respiratory exam: PRESENT: clear to auscultation kim


Cardiovascular exam: PRESENT: +S1, +S2


GI/Abdominal exam: PRESENT: soft


Neurological exam: PRESENT: alert, CN II-XII grossly intact





Results


Laboratory Results: 


                                        





                                 09/26/20 05:33 





                                 09/26/20 05:33 





                                        











  09/25/20 09/26/20 09/26/20





  21:16 05:33 05:33


 


WBC   7.6 


 


RBC   4.37 


 


Hgb   10.7 L 


 


Hct   32.0 L 


 


MCV   73 L 


 


MCH   24.5 L 


 


MCHC   33.5 


 


RDW   16.4 H 


 


Plt Count   303 


 


Seg Neutrophils %   63.7 


 


Sodium    136.3 L


 


Potassium    4.1


 


Chloride    103


 


Carbon Dioxide    24


 


Anion Gap    9


 


BUN    7


 


Creatinine  0.70   0.72


 


Est GFR ( Amer)  > 60   > 60


 


Glucose    184 H


 


Calcium    8.6











Impressions: 


                                        





Foot X-Ray  09/23/20 12:12


IMPRESSION:  Negative exam.  No conventional radiographic evidence of osteo

myelitis.


 














Assessment & Plan





- Diagnosis


(1) Type 2 diabetes mellitus with foot ulcer


Qualifiers: 


   Diabetes mellitus long term insulin use: with long term use   Qualified 

Code(s): E11.621 - Type 2 diabetes mellitus with foot ulcer; L97.509 - Non-

pressure chronic ulcer of other part of unspecified foot with unspecified 

severity; Z79.4 - Long term (current) use of insulin   


Is this a current diagnosis for this admission?: Yes   





(2) Cellulitis of unspecified part of limb


Qualifiers: 


   Site of cellulitis of extremity: toe   Laterality: left   Qualified Code(s): 

L03.032 - Cellulitis of left toe   


Is this a current diagnosis for this admission?: Yes   





(3) Type 2 diabetes mellitus with other skin complications


Is this a current diagnosis for this admission?: Yes   





(4) Gangrene of left foot


Is this a current diagnosis for this admission?: Yes   





- Time


Time Spent with patient: 35 or more minutes


Level of Care: IMCU


Medications reviewed and adjusted accordingly: Yes


Anticipated discharge: Home





- Plan Summary


Plan Summary: 





Patient will continue IV antibiotic

## 2020-09-27 RX ADMIN — ENOXAPARIN SODIUM SCH MG: 40 INJECTION SUBCUTANEOUS at 09:36

## 2020-09-27 RX ADMIN — INSULIN HUMAN SCH UNIT: 100 INJECTION, SUSPENSION SUBCUTANEOUS at 09:28

## 2020-09-27 RX ADMIN — INSULIN HUMAN SCH UNIT: 100 INJECTION, SUSPENSION SUBCUTANEOUS at 17:20

## 2020-09-27 RX ADMIN — PIPERACILLIN AND TAZOBACTAM SCH MLS/HR: 3; .375 INJECTION, POWDER, LYOPHILIZED, FOR SOLUTION INTRAVENOUS; PARENTERAL at 15:34

## 2020-09-27 RX ADMIN — AMLODIPINE BESYLATE SCH MG: 10 TABLET ORAL at 09:29

## 2020-09-27 RX ADMIN — PROBIOTIC PRODUCT - TAB SCH MG: TAB at 09:29

## 2020-09-27 RX ADMIN — VANCOMYCIN HYDROCHLORIDE SCH MLS/HR: 1 INJECTION, POWDER, LYOPHILIZED, FOR SOLUTION INTRAVENOUS at 13:03

## 2020-09-27 RX ADMIN — HYDROCHLOROTHIAZIDE SCH MG: 12.5 CAPSULE ORAL at 09:29

## 2020-09-27 RX ADMIN — VANCOMYCIN HYDROCHLORIDE SCH MLS/HR: 1 INJECTION, POWDER, LYOPHILIZED, FOR SOLUTION INTRAVENOUS at 21:30

## 2020-09-27 RX ADMIN — VANCOMYCIN HYDROCHLORIDE SCH MLS/HR: 1 INJECTION, POWDER, LYOPHILIZED, FOR SOLUTION INTRAVENOUS at 05:34

## 2020-09-27 RX ADMIN — INSULIN LISPRO SCH UNIT: 100 INJECTION, SOLUTION INTRAVENOUS; SUBCUTANEOUS at 12:15

## 2020-09-27 RX ADMIN — INSULIN LISPRO SCH: 100 INJECTION, SOLUTION INTRAVENOUS; SUBCUTANEOUS at 07:56

## 2020-09-27 RX ADMIN — METFORMIN HYDROCHLORIDE SCH MG: 500 TABLET, FILM COATED ORAL at 15:40

## 2020-09-27 RX ADMIN — PANTOPRAZOLE SODIUM SCH MG: 40 TABLET, DELAYED RELEASE ORAL at 05:34

## 2020-09-27 RX ADMIN — FERROUS SULFATE TAB 325 MG (65 MG ELEMENTAL FE) SCH MG: 325 (65 FE) TAB at 09:29

## 2020-09-27 RX ADMIN — LOSARTAN POTASSIUM SCH MG: 50 TABLET, FILM COATED ORAL at 09:29

## 2020-09-27 RX ADMIN — INSULIN LISPRO SCH UNIT: 100 INJECTION, SOLUTION INTRAVENOUS; SUBCUTANEOUS at 17:19

## 2020-09-27 RX ADMIN — PIPERACILLIN AND TAZOBACTAM SCH MLS/HR: 3; .375 INJECTION, POWDER, LYOPHILIZED, FOR SOLUTION INTRAVENOUS; PARENTERAL at 09:29

## 2020-09-27 RX ADMIN — INSULIN LISPRO SCH: 100 INJECTION, SOLUTION INTRAVENOUS; SUBCUTANEOUS at 21:30

## 2020-09-27 RX ADMIN — PIPERACILLIN AND TAZOBACTAM SCH MLS/HR: 3; .375 INJECTION, POWDER, LYOPHILIZED, FOR SOLUTION INTRAVENOUS; PARENTERAL at 05:34

## 2020-09-27 RX ADMIN — PROBIOTIC PRODUCT - TAB SCH MG: TAB at 17:20

## 2020-09-27 RX ADMIN — PIPERACILLIN AND TAZOBACTAM SCH MLS/HR: 3; .375 INJECTION, POWDER, LYOPHILIZED, FOR SOLUTION INTRAVENOUS; PARENTERAL at 21:30

## 2020-09-27 RX ADMIN — METFORMIN HYDROCHLORIDE SCH MG: 500 TABLET, FILM COATED ORAL at 09:29

## 2020-09-27 RX ADMIN — SODIUM CHLORIDE PRN MLS/HR: 9 INJECTION, SOLUTION INTRAVENOUS at 10:28

## 2020-09-27 NOTE — PDOC PROGRESS REPORT
Subjective


Progress Note for:: 09/27/20


Reason For Visit: 


DIABETIC ULCER OF TOE/CELLULITIS


Patient has no complaints, states left foot feels better





Physical Exam


Vital Signs: 


                                        











Temp Pulse Resp BP Pulse Ox


 


 98.4 F   69   16   120/65   100 


 


 09/27/20 11:50  09/27/20 11:50  09/27/20 11:50  09/27/20 11:50  09/27/20 11:50








                                 Intake & Output











 09/26/20 09/27/20 09/28/20





 06:59 06:59 06:59


 


Intake Total 2640 4430 610


 


Balance 2640 4430 610


 


Weight 161.4 kg 151.4 kg 











General appearance: PRESENT: no acute distress


Musculoskeletal exam: PRESENT: other - Left foot dressing removed down to the 

skin and wound; wound packing removed, no foul-smelling drainage; granulation 

tissue filling in.:  Left second toe dry, no change from yesterday; mild to 

moderate edema of the foot however less cellulitic changes; range of motion of 

foot good





Results


Laboratory Results: 


                                        





                                 09/26/20 05:33 





                                 09/26/20 05:33 





                                        





09/25/20 13:20   Toe - Diabetic Ulcer   Gram Stain - Final








Impressions: 


                                        





Foot X-Ray  09/23/20 12:12


IMPRESSION:  Negative exam.  No conventional radiographic evidence of 

osteomyelitis.


 














Assessment & Plan





- Diagnosis


(1) Diabetic infection of left foot


Is this a current diagnosis for this admission?: Yes   


Plan: 


Impression: Patient is postoperative day 2 status post left ray amputation, with

a stable wound, resolving sepsis; growing gram Streptococcus and Candida; range 

of motion of the foot





Plan:





1.  May be up with surgical boot





2.  We will discuss addition of antifungal therapy





3.  No indication for additional surgical debridement at this time.








(2) Hyperglycemia


Is this a current diagnosis for this admission?: Yes   





(3) Morbid (severe) obesity due to excess calories


Is this a current diagnosis for this admission?: Yes   





- Time


Time Spent: 30 to 50 Minutes


Critical Time spent with patient: Less than 15 minutes


Anticipated Discharge Disposition: Home, Self Care


Anticipated Discharge Timeframe: TBD

## 2020-09-27 NOTE — PDOC PROGRESS REPORT
Subjective


Progress Note for:: 09/27/20


Subjective:: 





Patient seen by the bedside, the wound culture grew Candida, Streptococcus


Reason For Visit: 


DIABETIC ULCER OF TOE/CELLULITIS








Physical Exam


Vital Signs: 


                                        











Temp Pulse Resp BP Pulse Ox


 


 98.4 F   73   16   120/65   100 


 


 09/27/20 11:50  09/27/20 14:00  09/27/20 11:50  09/27/20 11:50  09/27/20 11:50








                                 Intake & Output











 09/26/20 09/27/20 09/28/20





 06:59 06:59 06:59


 


Intake Total 2640 4430 860


 


Balance 2640 4430 860


 


Weight 161.4 kg 151.4 kg 











General appearance: PRESENT: no acute distress


Eye exam: PRESENT: PERRLA


Respiratory exam: PRESENT: clear to auscultation kim


Cardiovascular exam: PRESENT: +S1, +S2


GI/Abdominal exam: PRESENT: soft


Neurological exam: PRESENT: alert, CN II-XII grossly intact





Results


Laboratory Results: 


                                        





                                 09/26/20 05:33 





                                 09/26/20 05:33 





                                        





09/25/20 13:20   Toe - Diabetic Ulcer   Gram Stain - Final








Impressions: 


                                        





Foot X-Ray  09/23/20 12:12


IMPRESSION:  Negative exam.  No conventional radiographic evidence of 

osteomyelitis.


 














Assessment & Plan





- Diagnosis


(1) Type 2 diabetes mellitus with foot ulcer


Qualifiers: 


   Diabetes mellitus long term insulin use: with long term use   Qualified 

Code(s): E11.621 - Type 2 diabetes mellitus with foot ulcer; L97.509 - Non-

pressure chronic ulcer of other part of unspecified foot with unspecified 

severity; Z79.4 - Long term (current) use of insulin   


Is this a current diagnosis for this admission?: Yes   





(2) Cellulitis of unspecified part of limb


Qualifiers: 


   Site of cellulitis of extremity: toe   Laterality: left   Qualified Code(s): 

L03.032 - Cellulitis of left toe   


Is this a current diagnosis for this admission?: Yes   





(3) Type 2 diabetes mellitus with other skin complications


Is this a current diagnosis for this admission?: Yes   





(4) Gangrene of left foot


Is this a current diagnosis for this admission?: Yes   


Plan: 


Status post amputation of the toes, wound culture grew Candida, start Diflucan








- Time


Time Spent with patient: 25-34 minutes


Level of Care: IMCU


Medications reviewed and adjusted accordingly: Yes


Anticipated discharge: Home


Anticipated DC Timeframe: Other

## 2020-09-28 RX ADMIN — SODIUM CHLORIDE PRN MLS/HR: 9 INJECTION, SOLUTION INTRAVENOUS at 19:05

## 2020-09-28 RX ADMIN — INSULIN LISPRO SCH: 100 INJECTION, SOLUTION INTRAVENOUS; SUBCUTANEOUS at 17:52

## 2020-09-28 RX ADMIN — FERROUS SULFATE TAB 325 MG (65 MG ELEMENTAL FE) SCH MG: 325 (65 FE) TAB at 09:04

## 2020-09-28 RX ADMIN — AMLODIPINE BESYLATE SCH MG: 10 TABLET ORAL at 09:03

## 2020-09-28 RX ADMIN — PIPERACILLIN AND TAZOBACTAM SCH MLS/HR: 3; .375 INJECTION, POWDER, LYOPHILIZED, FOR SOLUTION INTRAVENOUS; PARENTERAL at 09:03

## 2020-09-28 RX ADMIN — PANTOPRAZOLE SODIUM SCH MG: 40 TABLET, DELAYED RELEASE ORAL at 06:54

## 2020-09-28 RX ADMIN — INSULIN LISPRO SCH UNIT: 100 INJECTION, SOLUTION INTRAVENOUS; SUBCUTANEOUS at 22:01

## 2020-09-28 RX ADMIN — PROBIOTIC PRODUCT - TAB SCH MG: TAB at 17:49

## 2020-09-28 RX ADMIN — INSULIN LISPRO SCH UNIT: 100 INJECTION, SOLUTION INTRAVENOUS; SUBCUTANEOUS at 11:58

## 2020-09-28 RX ADMIN — INSULIN HUMAN SCH UNIT: 100 INJECTION, SUSPENSION SUBCUTANEOUS at 17:49

## 2020-09-28 RX ADMIN — INSULIN HUMAN SCH UNIT: 100 INJECTION, SUSPENSION SUBCUTANEOUS at 09:04

## 2020-09-28 RX ADMIN — METFORMIN HYDROCHLORIDE SCH MG: 500 TABLET, FILM COATED ORAL at 17:50

## 2020-09-28 RX ADMIN — PROBIOTIC PRODUCT - TAB SCH MG: TAB at 09:03

## 2020-09-28 RX ADMIN — ENOXAPARIN SODIUM SCH MG: 40 INJECTION SUBCUTANEOUS at 09:10

## 2020-09-28 RX ADMIN — SODIUM CHLORIDE PRN MLS/HR: 9 INJECTION, SOLUTION INTRAVENOUS at 09:03

## 2020-09-28 RX ADMIN — LOSARTAN POTASSIUM SCH MG: 50 TABLET, FILM COATED ORAL at 09:03

## 2020-09-28 RX ADMIN — PIPERACILLIN AND TAZOBACTAM SCH MLS/HR: 3; .375 INJECTION, POWDER, LYOPHILIZED, FOR SOLUTION INTRAVENOUS; PARENTERAL at 02:27

## 2020-09-28 RX ADMIN — INSULIN LISPRO SCH: 100 INJECTION, SOLUTION INTRAVENOUS; SUBCUTANEOUS at 08:47

## 2020-09-28 RX ADMIN — METFORMIN HYDROCHLORIDE SCH MG: 500 TABLET, FILM COATED ORAL at 09:03

## 2020-09-28 RX ADMIN — VANCOMYCIN HYDROCHLORIDE SCH MLS/HR: 1 INJECTION, POWDER, LYOPHILIZED, FOR SOLUTION INTRAVENOUS at 17:51

## 2020-09-28 RX ADMIN — VANCOMYCIN HYDROCHLORIDE SCH MLS/HR: 1 INJECTION, POWDER, LYOPHILIZED, FOR SOLUTION INTRAVENOUS at 06:54

## 2020-09-28 RX ADMIN — HYDROCHLOROTHIAZIDE SCH MG: 12.5 CAPSULE ORAL at 09:03

## 2020-09-28 NOTE — PDOC PROGRESS REPORT
Subjective


Progress Note for:: 09/28/20


Reason For Visit: 


DIABETIC ULCER OF TOE/CELLULITIS








Physical Exam


Vital Signs: 


                                        











Temp Pulse Resp BP Pulse Ox


 


 98.2 F   67   13   138/83 H  99 


 


 09/28/20 11:36  09/28/20 11:36  09/28/20 11:36  09/28/20 11:36  09/28/20 11:36








                                 Intake & Output











 09/27/20 09/28/20 09/29/20





 06:59 06:59 06:59


 


Intake Total 4430 3490 2623


 


Balance 4430 3490 2623


 


Weight 151.4 kg 150.4 kg 














Results


Laboratory Results: 


                                        





                                 09/26/20 05:33 





                                 09/26/20 05:33 





                                        





09/23/20 18:12   Blood   Blood Culture - Final


                            NO GROWTH IN 5 DAYS


09/23/20 12:25   Blood   Blood Culture - Final


                            NO GROWTH IN 5 DAYS


09/25/20 13:20   Toe - Diabetic Ulcer   Gram Stain - Final


09/25/20 13:20   Toe - Diabetic Ulcer   Wound Culture - Final


                            Group C Beta Streptococcus


                            C.albicans/C.dubliniensis


                            No Anaerobic Organisms








Impressions: 


                                        





Foot X-Ray  09/23/20 12:12


IMPRESSION:  Negative exam.  No conventional radiographic evidence of 

osteomyelitis.


 














Assessment & Plan





- Diagnosis


(1) Gangrene of left foot


Is this a current diagnosis for this admission?: Yes   





- Time


Anticipated Discharge Disposition: Home with Home Health


Anticipated Discharge Timeframe: within 48 hours





- Plan Summary


Plan Summary: 





35-year-old female status post amputation of the left fifth toe with drainage of

a large foot abscess.  I have removed her dressing today and inspected the 

wound.  It appears clean, without purulence.  The erythema is decreasing.  I 

have ordered a home wound VAC.  Continue with damp to dry dressing changes twice

daily in the hospital.  Once her wound VAC has been approved, she is ready for 

discharge.  She should follow-up with [facility] in 1 to 2 weeks for

wound care.  Surgery will sign off at this time.  Please renotify with any 

questions or concerns.

## 2020-09-29 RX ADMIN — INSULIN LISPRO SCH: 100 INJECTION, SOLUTION INTRAVENOUS; SUBCUTANEOUS at 17:29

## 2020-09-29 RX ADMIN — METFORMIN HYDROCHLORIDE SCH MG: 500 TABLET, FILM COATED ORAL at 09:13

## 2020-09-29 RX ADMIN — INSULIN LISPRO SCH: 100 INJECTION, SOLUTION INTRAVENOUS; SUBCUTANEOUS at 09:03

## 2020-09-29 RX ADMIN — SODIUM CHLORIDE PRN MLS/HR: 9 INJECTION, SOLUTION INTRAVENOUS at 04:45

## 2020-09-29 RX ADMIN — ENOXAPARIN SODIUM SCH MG: 40 INJECTION SUBCUTANEOUS at 09:14

## 2020-09-29 RX ADMIN — PANTOPRAZOLE SODIUM SCH MG: 40 TABLET, DELAYED RELEASE ORAL at 05:55

## 2020-09-29 RX ADMIN — LOSARTAN POTASSIUM SCH MG: 50 TABLET, FILM COATED ORAL at 09:14

## 2020-09-29 RX ADMIN — INSULIN HUMAN SCH UNIT: 100 INJECTION, SUSPENSION SUBCUTANEOUS at 09:13

## 2020-09-29 RX ADMIN — FERROUS SULFATE TAB 325 MG (65 MG ELEMENTAL FE) SCH MG: 325 (65 FE) TAB at 09:12

## 2020-09-29 RX ADMIN — AMLODIPINE BESYLATE SCH MG: 10 TABLET ORAL at 09:12

## 2020-09-29 RX ADMIN — METFORMIN HYDROCHLORIDE SCH MG: 500 TABLET, FILM COATED ORAL at 17:28

## 2020-09-29 RX ADMIN — INSULIN LISPRO SCH UNIT: 100 INJECTION, SOLUTION INTRAVENOUS; SUBCUTANEOUS at 12:44

## 2020-09-29 RX ADMIN — INSULIN HUMAN SCH UNIT: 100 INJECTION, SUSPENSION SUBCUTANEOUS at 17:28

## 2020-09-29 RX ADMIN — PROBIOTIC PRODUCT - TAB SCH MG: TAB at 17:28

## 2020-09-29 RX ADMIN — HYDROCHLOROTHIAZIDE SCH MG: 12.5 CAPSULE ORAL at 09:12

## 2020-09-29 RX ADMIN — INSULIN LISPRO SCH: 100 INJECTION, SOLUTION INTRAVENOUS; SUBCUTANEOUS at 21:00

## 2020-09-29 RX ADMIN — PROBIOTIC PRODUCT - TAB SCH MG: TAB at 09:12

## 2020-09-29 NOTE — PDOC PROGRESS REPORT
Subjective


Progress Note for:: 09/28/20


Subjective:: 





No fever or chills. No chest pain or difficulty with breathing. No nausea, 

vomiting, or abdominal pain. Awaiting wound vac application to her left foot 

amputated toes.


Reason For Visit: 


DIABETIC ULCER OF TOE/CELLULITIS








Physical Exam


Vital Signs: 


                                        











Temp Pulse Resp BP Pulse Ox


 


 98.2 F   67   13   138/83 H  99 


 


 09/28/20 11:36  09/28/20 11:36  09/28/20 11:36  09/28/20 11:36  09/28/20 11:36








                                 Intake & Output











 09/27/20 09/28/20 09/29/20





 06:59 06:59 06:59


 


Intake Total 4430 3490 250


 


Balance 4430 3490 250


 


Weight 151.4 kg 150.4 kg 











Physical Exam: 





General appearance: PRESENT: appropriate in responses, morbidly obese


Head exam: PRESENT: atraumatic, normocephalic


Eye exam: PRESENT: conjunctiva pink.  ABSENT: pallor, sclera icterus


Mouth exam: PRESENT: moist


Respiratory exam: PRESENT: clear to auscultation kim


Cardiovascular exam: PRESENT: RRR, +S1, +S2.  ABSENT: diastolic murmur, rubs, 

systolic murmur


GI/Abdominal exam: PRESENT: normal bowel sounds, soft.  ABSENT: distended, 

guarding, mass, organomegaly, rebound, tenderness.


Extremities exam: ABSENT: pedal edema, s/p left 2nd and 5th toes amputation 

dressing satisfactory.


Neurological exam: PRESENT: alert, awake, oriented to person, oriented to place,

oriented to time, oriented to situation, CN II-XII grossly intact.  ABSENT: 

motor sensory deficit


Psychiatric exam: PRESENT: appropriate affect, normal mood.  ABSENT: homicidal 

ideation, suicidal ideation


Skin exam: PRESENT: dry, warm. Satisfactory left foot dressing.





Results


Laboratory Results: 


                                        





                                 09/26/20 05:33 





                                 09/26/20 05:33 





                                        





09/23/20 12:25   Blood   Blood Culture - Final


                            NO GROWTH IN 5 DAYS


09/25/20 13:20   Toe - Diabetic Ulcer   Gram Stain - Final


09/25/20 13:20   Toe - Diabetic Ulcer   Wound Culture - Final


                            Group C Beta Streptococcus


                            C.albicans/C.dubliniensis


                            No Anaerobic Organisms








Impressions: 


                                        





Foot X-Ray  09/23/20 12:12


IMPRESSION:  Negative exam.  No conventional radiographic evidence of 

osteomyelitis.


 














Assessment & Plan





- Diagnosis


(1) Cellulitis of foot


Is this a current diagnosis for this admission?: Yes   





(2) Diabetic ulcer of toe


Qualifiers: 


   Diabetes mellitus type: type 2   Laterality: left   Non-pressure ulcer stage:

with other severity   Qualified Code(s): E11.621 - Type 2 diabetes mellitus with

foot ulcer; L97.528 - Non-pressure chronic ulcer of other part of left foot with

other specified severity   


Is this a current diagnosis for this admission?: Yes   





(3) Uncontrolled stage 2 hypertension


Is this a current diagnosis for this admission?: Yes   





(4) Uncontrolled type 2 diabetes mellitus


Qualifiers: 


   Glycemic state: with hyperglycemia   Qualified Code(s): E11.65 - Type 2 

diabetes mellitus with hyperglycemia   


Is this a current diagnosis for this admission?: Yes   





(5) Morbid (severe) obesity due to excess calories


Is this a current diagnosis for this admission?: Yes   





- Time


Time Spent with patient: 25-34 minutes


Level of Care: IMCU


Medications reviewed and adjusted accordingly: Yes


Anticipated discharge: Home with Homehealth


Anticipated DC Timeframe: within 72 hours





- Inpatient Certification


Based on my medical assessment, after consideration of the patient's 

comorbidities, presenting symptoms, or acuity I expect that the services needed 

warrant INPATIENT care.: Yes


I certify that my determination is in accordance with my understanding of 

Medicare's requirements for reasonable and necessary INPATIENT services [42 CFR 

412.3e].: Yes


Medical Necessity: Significant Comorbidiites Make Outpatient Treatment Too 

Risky, Need Close Monitoring Due to Risk of Patient Decompensation, Need For IV 

Fluids, Need For Continuous Telemetry Monitoring, Need for IV Antibiotics, Need 

for Surgery, Risk of Complication if Not Cared For in Hospital, Risk of 

Diagnosis Which Will Require Inpatient Eval/Care/Monitoring


Post Hospital Care: D/C Planner Documentation





- Plan Summary


Plan Summary: 





D/C IV antibiotic coverage. No bacterial growth x 5 days of blood culture. 

Continue local wound care as per surgical team recommendations.

## 2020-09-29 NOTE — PDOC PROGRESS REPORT
Subjective


Progress Note for:: 09/29/20


Subjective:: 





No fever or chills. No chest pain or difficulty with breathing. No fever or 

chills. Accucheck improving. Left foot wound post amputation satisfactory. No 

nausea, vomiting, or abdominal pain. Still awaiting wound vac device for her 

left foot amputated toes wound management.


Reason For Visit: 


DIABETIC ULCER OF TOE/CELLULITIS








Physical Exam


Vital Signs: 


                                        











Temp Pulse Resp BP Pulse Ox


 


 98.1 F   74   17   114/65   100 


 


 09/29/20 15:43  09/29/20 15:43  09/29/20 15:43  09/29/20 15:43  09/29/20 15:43








                                 Intake & Output











 09/28/20 09/29/20 09/30/20





 06:59 06:59 06:59


 


Intake Total 3490 5520 910


 


Balance 3490 5520 910


 


Weight 150.4 kg 160.2 kg 











Physical Exam: 





General appearance: PRESENT: appropriate in responses, morbidly obese


Head exam: PRESENT: atraumatic, normocephalic


Eye exam: PRESENT: conjunctiva pink.  ABSENT: pallor, sclera icterus


Mouth exam: PRESENT: moist


Respiratory exam: PRESENT: clear to auscultation kim


Cardiovascular exam: PRESENT: RRR, +S1, +S2.  ABSENT: diastolic murmur, rubs, 

systolic murmur


GI/Abdominal exam: PRESENT: normal bowel sounds, soft.  ABSENT: distended, 

guarding, mass, organomegaly, rebound, tenderness.


Extremities exam: ABSENT: pedal edema, s/p left 2nd and 5th toes amputation 

dressing satisfactory.


Neurological exam: PRESENT: alert, awake, oriented to person, oriented to place,

oriented to time, oriented to situation, CN II-XII grossly intact.  ABSENT: 

motor sensory deficit


Psychiatric exam: PRESENT: appropriate affect, normal mood.  ABSENT: homicidal 

ideation, suicidal ideation


Skin exam: PRESENT: dry, warm. Satisfactory left foot dressing.





Results


Laboratory Results: 


                                        





                                 09/26/20 05:33 





                                 09/26/20 05:33 





                                        





09/23/20 18:12   Blood   Blood Culture - Final


                            NO GROWTH IN 5 DAYS








Impressions: 


                                        





Foot X-Ray  09/23/20 12:12


IMPRESSION:  Negative exam.  No conventional radiographic evidence of 

osteomyelitis.


 














Assessment & Plan





- Diagnosis


(1) Cellulitis of foot


Is this a current diagnosis for this admission?: Yes   





(2) Diabetic ulcer of toe


Qualifiers: 


   Diabetes mellitus type: type 2   Laterality: left   Non-pressure ulcer stage:

with other severity   Qualified Code(s): E11.621 - Type 2 diabetes mellitus with

foot ulcer; L97.528 - Non-pressure chronic ulcer of other part of left foot with

other specified severity   


Is this a current diagnosis for this admission?: Yes   





(3) Uncontrolled stage 2 hypertension


Is this a current diagnosis for this admission?: Yes   





(4) Uncontrolled type 2 diabetes mellitus


Qualifiers: 


   Glycemic state: with hyperglycemia   Qualified Code(s): E11.65 - Type 2 

diabetes mellitus with hyperglycemia   


Is this a current diagnosis for this admission?: Yes   





(5) Morbid (severe) obesity due to excess calories


Is this a current diagnosis for this admission?: Yes   





- Time


Time Spent with patient: 25-34 minutes


Level of Care: IMCU


Medications reviewed and adjusted accordingly: Yes


Anticipated discharge: Home with Homehealth


Anticipated DC Timeframe: within 24 hours





- Inpatient Certification


Based on my medical assessment, after consideration of the patient's 

comorbidities, presenting symptoms, or acuity I expect that the services needed 

warrant INPATIENT care.: Yes


I certify that my determination is in accordance with my understanding of 

Medicare's requirements for reasonable and necessary INPATIENT services [42 CFR 

412.3e].: Yes


Medical Necessity: Significant Comorbidiites Make Outpatient Treatment Too 

Risky, Need Close Monitoring Due to Risk of Patient Decompensation, Need For IV 

Fluids, Need For Continuous Telemetry Monitoring, Risk of Complication if Not 

Cared For in Hospital, Risk of Diagnosis Which Will Require Inpatient 

Eval/Care/Monitoring


Post Hospital Care: D/C Planner Documentation





- Plan Summary


Plan Summary: 





Continue current medication management. Follow up on wound vac device 

availability. Possible d/c tomorrow.

## 2020-09-30 VITALS — SYSTOLIC BLOOD PRESSURE: 133 MMHG | DIASTOLIC BLOOD PRESSURE: 81 MMHG

## 2020-09-30 LAB
ADD MANUAL DIFF: NO
ANION GAP SERPL CALC-SCNC: 10 MMOL/L (ref 5–19)
BASOPHILS # BLD AUTO: 0.1 10^3/UL (ref 0–0.2)
BASOPHILS NFR BLD AUTO: 0.8 % (ref 0–2)
BUN SERPL-MCNC: 7 MG/DL (ref 7–20)
CALCIUM: 8.9 MG/DL (ref 8.4–10.2)
CHLORIDE SERPL-SCNC: 105 MMOL/L (ref 98–107)
CO2 SERPL-SCNC: 21 MMOL/L (ref 22–30)
EOSINOPHIL # BLD AUTO: 0.2 10^3/UL (ref 0–0.6)
EOSINOPHIL NFR BLD AUTO: 3.1 % (ref 0–6)
ERYTHROCYTE [DISTWIDTH] IN BLOOD BY AUTOMATED COUNT: 16.3 % (ref 11.5–14)
GLUCOSE SERPL-MCNC: 134 MG/DL (ref 75–110)
HCT VFR BLD CALC: 32.5 % (ref 36–47)
HGB BLD-MCNC: 10.9 G/DL (ref 12–15.5)
LYMPHOCYTES # BLD AUTO: 1.5 10^3/UL (ref 0.5–4.7)
LYMPHOCYTES NFR BLD AUTO: 20.9 % (ref 13–45)
MCH RBC QN AUTO: 24.6 PG (ref 27–33.4)
MCHC RBC AUTO-ENTMCNC: 33.7 G/DL (ref 32–36)
MCV RBC AUTO: 73 FL (ref 80–97)
MONOCYTES # BLD AUTO: 0.7 10^3/UL (ref 0.1–1.4)
MONOCYTES NFR BLD AUTO: 9.5 % (ref 3–13)
NEUTROPHILS # BLD AUTO: 4.8 10^3/UL (ref 1.7–8.2)
NEUTS SEG NFR BLD AUTO: 65.7 % (ref 42–78)
PLATELET # BLD: 339 10^3/UL (ref 150–450)
POTASSIUM SERPL-SCNC: 4.5 MMOL/L (ref 3.6–5)
RBC # BLD AUTO: 4.44 10^6/UL (ref 3.72–5.28)
TOTAL CELLS COUNTED % (AUTO): 100 %
WBC # BLD AUTO: 7.3 10^3/UL (ref 4–10.5)

## 2020-09-30 RX ADMIN — PANTOPRAZOLE SODIUM SCH MG: 40 TABLET, DELAYED RELEASE ORAL at 05:14

## 2020-09-30 RX ADMIN — INSULIN HUMAN SCH UNIT: 100 INJECTION, SUSPENSION SUBCUTANEOUS at 09:34

## 2020-09-30 RX ADMIN — PROBIOTIC PRODUCT - TAB SCH MG: TAB at 09:34

## 2020-09-30 RX ADMIN — SODIUM CHLORIDE PRN MLS/HR: 9 INJECTION, SOLUTION INTRAVENOUS at 05:14

## 2020-09-30 RX ADMIN — HYDROCHLOROTHIAZIDE SCH MG: 12.5 CAPSULE ORAL at 09:34

## 2020-09-30 RX ADMIN — AMLODIPINE BESYLATE SCH MG: 10 TABLET ORAL at 09:34

## 2020-09-30 RX ADMIN — LOSARTAN POTASSIUM SCH MG: 50 TABLET, FILM COATED ORAL at 09:34

## 2020-09-30 RX ADMIN — FERROUS SULFATE TAB 325 MG (65 MG ELEMENTAL FE) SCH MG: 325 (65 FE) TAB at 09:35

## 2020-09-30 RX ADMIN — INSULIN LISPRO SCH: 100 INJECTION, SOLUTION INTRAVENOUS; SUBCUTANEOUS at 09:35

## 2020-09-30 RX ADMIN — ENOXAPARIN SODIUM SCH MG: 40 INJECTION SUBCUTANEOUS at 09:35

## 2020-09-30 RX ADMIN — METFORMIN HYDROCHLORIDE SCH MG: 500 TABLET, FILM COATED ORAL at 09:34

## 2020-09-30 NOTE — PDOC DISCHARGE SUMMARY
Impression





- Admit/DC Date/PCP


Admission Date/Primary Care Provider: 


  09/23/20 18:12





  ERIKA CHILD





Discharge Date: 09/30/20





- Discharge Diagnosis


(1) Cellulitis of foot


Is this a current diagnosis for this admission?: Yes   





(2) Diabetic ulcer of toe


Is this a current diagnosis for this admission?: Yes   





(3) Uncontrolled stage 2 hypertension


Is this a current diagnosis for this admission?: Yes   





(4) Uncontrolled type 2 diabetes mellitus


Is this a current diagnosis for this admission?: Yes   





(5) Morbid (severe) obesity due to excess calories


Is this a current diagnosis for this admission?: Yes   





- Assessment


Summary: 


Patient was admitted for left foot cellulitis with open 2nd and 5th toes wound 

and possible infection. Patient reported use of heat compress on affected foot 

couple of days prior to her presentation and worsening blisters formation on the

foot and toes. Her blood glucose and diabetic control has been poor. She was 

seen in consultation by the surgicalist group and eventually taken to surgery on

09/25/2020 with left 2nf and 5th toes amputation. Post operatively remain 

stable. Blood culture was reported no growth after 5 days of incubation. Her 

tissue pathology reported evidence of osteomyelitis in 2nd left toe with viable 

margin. She was treated with antibiotics for the duration of her blood culture 

period. She remain afebrile in the lst 72 hours. She will be discharged home [facility] service to manage her surgical wound on wound vac. she will follow up in 

the office as instructed upon discharge.





- Additional Information


Resuscitation Status: Full Code


Discharge Diet: Cardiac, Diabetic


Discharge Activity: Activity As Tolerated


Referrals: 


ERIKA CHILD MD [Primary Care Provider] - 


Home Medications: 








Metformin HCl 1,000 mg PO BID #60 tablet 03/07/19 


Amlodipine Besylate [Norvasc 10 mg Tablet] 10 mg PO DAILY 09/23/20 


Cyanocobalamin (Vitamin B-12) [Vitamin B-12] 50 mcg PO DAILY 09/23/20 


Ferrous Sulfate [Feosol 325 mg Tablet] 325 mg PO DAILY 09/23/20 


Hydrochlorothiazide [Hydrodiuril 12.5 mg Tablet] 12.5 mg PO DAILY 09/23/20 


Insulin NPH Human Isophane [Novolin N Flexpen] 30 units SQ QPM 09/23/20 


Insulin NPH Human Isophane [Novolin N Flexpen] 50 units SQ QAM 09/23/20 


Insulin Regular, Human [Novolin R] 0 units SQ .PERSLIDINGSCALE MDD AT LUNCH 

09/23/20 


Irbesartan 300 mg PO DAILY 09/23/20 


Lactobacillus Acidophilus/Fos [Acidophilus Probiotic Tablet] 1 tab PO BID 

09/23/20 











History of Present Illiness


History of Present Illness: 


CHEPE SCHMITT is a 35 year old female patient known to my practice who presented

to the ED with several weeks of left foot and leg pain. Patient reported usage 

of warm compress on her left foot for pain. She subsequently developed blister 

lesion on her pinky toe that eventually developed into a sore.  Patient reported

development of warmth and swelling over her left foot. There is development of 

black discoloration over her second toe with increasing pain that prompted her 

coming to the ED. She denied any definite fever or elevated temperature but 

admitted to intermittent chills. She has history of diabetes mellitus type 2 and

admitted to poor glycemic control. Her medication and dietary compliance remain 

a concern. She denied any chest pain, abdominal pain, nausea, or vomiting. Her 

morbidities are as listed below. She was advised hospitalization for further 

evaluation and management.


 





Hospital Course


Hospital Course: 


Patient was admitted for left foot cellulitis with open 2nd and 5th toes wound 

and possible infection. Patient reported use of heat compress on affected foot 

couple of days prior to her presentation and worsening blisters formation on the

foot and toes. Her blood glucose and diabetic control has been poor. She was 

seen in consultation by the surgicalist group and eventually taken to surgery on

09/25/2020 with left 2nf and 5th toes amputation. Post operatively remain 

stable. Blood culture was reported no growth after 5 days of incubation. Her 

tissue pathology reported evidence of osteomyelitis in 2nd left toe with viable 

margin. She was treated with antibiotics for the duration of her blood culture 

period. She remain afebrile in the lst 72 hours. She will be discharged home 

with A service to manage her surgical wound on wound vac. she will follow up 

in the office as instructed upon discharge.





Physical Exam


Vital Signs: 


                                        











Temp Pulse Resp BP Pulse Ox


 


 98.3 F   67   16   135/70 H  98 


 


 09/30/20 11:43  09/30/20 11:43  09/30/20 11:43  09/30/20 11:43  09/30/20 11:43








                                 Intake & Output











 09/29/20 09/30/20 10/01/20





 06:59 06:59 06:59


 


Intake Total 5520 7683 


 


Balance 5520 2685 


 


Weight 160.2 kg 160.2 kg 














General appearance: PRESENT: appropriate in responses, morbidly obese


Head exam: PRESENT: atraumatic, normocephalic


Eye exam: PRESENT: conjunctiva pink.  ABSENT: pallor, sclera icterus


Mouth exam: PRESENT: moist


Respiratory exam: PRESENT: clear to auscultation kim


Cardiovascular exam: PRESENT: RRR, +S1, +S2.  ABSENT: diastolic murmur, rubs, 

systolic murmur


GI/Abdominal exam: PRESENT: normal bowel sounds, soft.  ABSENT: distended, 

guarding, mass, organomegaly, rebound, tenderness.


Extremities exam: ABSENT: pedal edema, s/p left 2nd and 5th toes amputation 

dressing satisfactory.


Neurological exam: PRESENT: alert, awake, oriented to person, oriented to place,

oriented to time, oriented to situation, CN II-XII grossly intact.  ABSENT: 

motor sensory deficit


Psychiatric exam: PRESENT: appropriate affect, normal mood.  ABSENT: homicidal 

ideation, suicidal ideation


Skin exam: PRESENT: dry, warm. Satisfactory left foot dressing.














Results


Laboratory Results: 


                                        











WBC  7.3 10^3/uL (4.0-10.5)   09/30/20  10:23    


 


RBC  4.44 10^6/uL (3.72-5.28)   09/30/20  10:23    


 


Hgb  10.9 g/dL (12.0-15.5)  L  09/30/20  10:23    


 


Hct  32.5 % (36.0-47.0)  L  09/30/20  10:23    


 


MCV  73 fl (80-97)  L  09/30/20  10:23    


 


MCH  24.6 pg (27.0-33.4)  L  09/30/20  10:23    


 


MCHC  33.7 g/dL (32.0-36.0)   09/30/20  10:23    


 


RDW  16.3 % (11.5-14.0)  H  09/30/20  10:23    


 


Plt Count  339 10^3/uL (150-450)   09/30/20  10:23    


 


Lymph % (Auto)  20.9 % (13-45)   09/30/20  10:23    


 


Mono % (Auto)  9.5 % (3-13)   09/30/20  10:23    


 


Eos % (Auto)  3.1 % (0-6)   09/30/20  10:23    


 


Baso % (Auto)  0.8 % (0-2)   09/30/20  10:23    


 


Absolute Neuts (auto)  4.8 10^3/uL (1.7-8.2)   09/30/20  10:23    


 


Absolute Lymphs (auto)  1.5 10^3/uL (0.5-4.7)   09/30/20  10:23    


 


Absolute Monos (auto)  0.7 10^3/uL (0.1-1.4)   09/30/20  10:23    


 


Absolute Eos (auto)  0.2 10^3/uL (0.0-0.6)   09/30/20  10:23    


 


Absolute Basos (auto)  0.1 10^3/uL (0.0-0.2)   09/30/20  10:23    


 


Seg Neutrophils %  65.7 % (42-78)   09/30/20  10:23    


 


Sodium  136.0 mmol/L (137-145)  L  09/30/20  10:23    


 


Potassium  4.5 mmol/L (3.6-5.0)   09/30/20  10:23    


 


Chloride  105 mmol/L ()   09/30/20  10:23    


 


Carbon Dioxide  21 mmol/L (22-30)  L  09/30/20  10:23    


 


Anion Gap  10  (5-19)   09/30/20  10:23    


 


BUN  7 mg/dL (7-20)   09/30/20  10:23    


 


Creatinine  0.70 mg/dL (0.52-1.25)   09/30/20  10:23    


 


Est GFR ( Amer)  > 60  (>60)   09/30/20  10:23    


 


Est GFR (MDRD) Non-Af  > 60  (>60)   09/30/20  10:23    


 


Glucose  134 mg/dL ()  H  09/30/20  10:23    


 


POC Glucose  130 mg/dL ()  H  09/30/20  11:43    


 


Hemoglobin A1c %  10.7 % (4.7-6.0)  H  09/24/20  06:04    


 


Lactic Acid  1.9 mmol/L (0.7-2.1)   09/23/20  12:25    


 


Calcium  8.9 mg/dL (8.4-10.2)   09/30/20  10:23    


 


Total Bilirubin  0.8 mg/dL (0.2-1.3)   09/24/20  06:04    


 


Direct Bilirubin  0.4 mg/dL (0.0-0.4)   09/24/20  06:04    


 


Neonat Total Bilirubin  Not Reportable   09/24/20  06:04    


 


Neonat Direct Bilirubin  Not Reportable   09/24/20  06:04    


 


Neonat Indirect Bili  Not Reportable   09/24/20  06:04    


 


AST  14 U/L (14-36)   09/24/20  06:04    


 


ALT  9 U/L (<35)   09/24/20  06:04    


 


Alkaline Phosphatase  71 U/L ()   09/24/20  06:04    


 


Total Protein  6.8 g/dL (6.3-8.2)   09/24/20  06:04    


 


Albumin  3.2 g/dL (3.5-5.0)  L  09/24/20  06:04    


 


Triglycerides  149 mg/dL (<150)   09/24/20  06:04    


 


Cholesterol  138.83 mg/dL (0-200)   09/24/20  06:04    


 


LDL Cholesterol Direct  81 mg/dL (<100)   09/24/20  06:04    


 


VLDL Cholesterol  30.0 mg/dL (10-31)   09/24/20  06:04    


 


HDL Cholesterol  30 mg/dL (>40)  L  09/24/20  06:04    


 


Urine Color  YELLOW   09/23/20  17:30    


 


Urine Appearance  CLEAR   09/23/20  17:30    


 


Urine pH  6.0  (5.0-9.0)   09/23/20  17:30    


 


Ur Specific Gravity  1.014   09/23/20  17:30    


 


Urine Protein  NEGATIVE mg/dL (NEGATIVE)   09/23/20  17:30    


 


Urine Glucose (UA)  150 mg/dL (NEGATIVE)  H  09/23/20  17:30    


 


Urine Ketones  NEGATIVE mg/dL (NEGATIVE)   09/23/20  17:30    


 


Urine Blood  NEGATIVE  (NEGATIVE)   09/23/20  17:30    


 


Urine Nitrite  NEGATIVE  (NEGATIVE)   09/23/20  17:30    


 


Urine Bilirubin  NEGATIVE  (NEGATIVE)   09/23/20  17:30    


 


Urine Urobilinogen  2.0 mg/dL (<2.0)  H  09/23/20  17:30    


 


Ur Leukocyte Esterase  NEGATIVE  (NEGATIVE)   09/23/20  17:30    


 


Urine WBC (Auto)  0 /HPF  09/23/20  17:30    


 


Urine RBC (Auto)  1 /HPF  09/23/20  17:30    


 


Squamous Epi Cells Auto  1 /HPF  09/23/20  17:30    


 


Urine Mucus (Auto)  RARE /LPF  09/23/20  17:30    


 


Urine Ascorbic Acid  NEGATIVE  (NEGATIVE)   09/23/20  17:30    


 


Urine HCG, Qual  NEGATIVE  (NEGATIVE)   09/23/20  17:30    


 


Time Trough Drawn  2116 09/25/20  21:16    


 


Vancomycin Trough  11.5 ug/mL (5.0-20.0)   09/25/20  21:16    


 


SARS-CoV-2 (PCR)  NEGATIVE  (NEGATIVE)   09/24/20  15:22    











Impressions: 


                                        





Foot X-Ray  09/23/20 12:12


IMPRESSION:  Negative exam.  No conventional radiographic evidence of 

osteomyelitis.


 














Plan


Health Concerns: 


Wound management with poorly controlled diabetes mellitus.


Plan of Treatment: 


Wound management with wound vac, improvement in medication and dietary 

compliance.


Goals: 


Reduce readmission risk level with close monitoring. She will benefit from 

remote patient monitoring for her glycemic control if approved by her insurance.


Time Spent: Greater than 30 Minutes





Stroke


Is this a Stroke Patient?: No





Acute Heart Failure


Is this a Heart Failure Patient?: No

## 2020-10-18 ENCOUNTER — HOSPITAL ENCOUNTER (EMERGENCY)
Dept: HOSPITAL 62 - ER | Age: 35
Discharge: HOME | End: 2020-10-18
Payer: COMMERCIAL

## 2020-10-18 VITALS — DIASTOLIC BLOOD PRESSURE: 93 MMHG | SYSTOLIC BLOOD PRESSURE: 146 MMHG

## 2020-10-18 DIAGNOSIS — R22.42: Primary | ICD-10-CM

## 2020-10-18 DIAGNOSIS — R50.9: ICD-10-CM

## 2020-10-18 DIAGNOSIS — Z89.422: ICD-10-CM

## 2020-10-18 DIAGNOSIS — M79.672: ICD-10-CM

## 2020-10-18 DIAGNOSIS — I10: ICD-10-CM

## 2020-10-18 DIAGNOSIS — E11.9: ICD-10-CM

## 2020-10-18 LAB
ADD MANUAL DIFF: NO
ALBUMIN SERPL-MCNC: 4 G/DL (ref 3.5–5)
ALP SERPL-CCNC: 80 U/L (ref 38–126)
ANION GAP SERPL CALC-SCNC: 13 MMOL/L (ref 5–19)
AST SERPL-CCNC: 15 U/L (ref 14–36)
BASOPHILS # BLD AUTO: 0.1 10^3/UL (ref 0–0.2)
BASOPHILS NFR BLD AUTO: 0.7 % (ref 0–2)
BILIRUB DIRECT SERPL-MCNC: 0.3 MG/DL (ref 0–0.4)
BILIRUB SERPL-MCNC: 0.4 MG/DL (ref 0.2–1.3)
BUN SERPL-MCNC: 11 MG/DL (ref 7–20)
CALCIUM: 9.6 MG/DL (ref 8.4–10.2)
CHLORIDE SERPL-SCNC: 99 MMOL/L (ref 98–107)
CO2 SERPL-SCNC: 23 MMOL/L (ref 22–30)
EOSINOPHIL # BLD AUTO: 0.1 10^3/UL (ref 0–0.6)
EOSINOPHIL NFR BLD AUTO: 1.8 % (ref 0–6)
ERYTHROCYTE [DISTWIDTH] IN BLOOD BY AUTOMATED COUNT: 16.7 % (ref 11.5–14)
GLUCOSE SERPL-MCNC: 231 MG/DL (ref 75–110)
HCT VFR BLD CALC: 33.1 % (ref 36–47)
HGB BLD-MCNC: 11.4 G/DL (ref 12–15.5)
INR PPP: 1
LYMPHOCYTES # BLD AUTO: 2.2 10^3/UL (ref 0.5–4.7)
LYMPHOCYTES NFR BLD AUTO: 29 % (ref 13–45)
MCH RBC QN AUTO: 24.7 PG (ref 27–33.4)
MCHC RBC AUTO-ENTMCNC: 34.6 G/DL (ref 32–36)
MCV RBC AUTO: 71 FL (ref 80–97)
MONOCYTES # BLD AUTO: 0.6 10^3/UL (ref 0.1–1.4)
MONOCYTES NFR BLD AUTO: 7.7 % (ref 3–13)
NEUTROPHILS # BLD AUTO: 4.6 10^3/UL (ref 1.7–8.2)
NEUTS SEG NFR BLD AUTO: 60.8 % (ref 42–78)
PLATELET # BLD: 377 10^3/UL (ref 150–450)
POTASSIUM SERPL-SCNC: 4.3 MMOL/L (ref 3.6–5)
PROT SERPL-MCNC: 8.5 G/DL (ref 6.3–8.2)
PROTHROMBIN TIME: 13.4 SEC (ref 11.4–15.4)
RBC # BLD AUTO: 4.63 10^6/UL (ref 3.72–5.28)
TOTAL CELLS COUNTED % (AUTO): 100 %
WBC # BLD AUTO: 7.5 10^3/UL (ref 4–10.5)

## 2020-10-18 PROCEDURE — 83605 ASSAY OF LACTIC ACID: CPT

## 2020-10-18 PROCEDURE — 96361 HYDRATE IV INFUSION ADD-ON: CPT

## 2020-10-18 PROCEDURE — 80053 COMPREHEN METABOLIC PANEL: CPT

## 2020-10-18 PROCEDURE — 87040 BLOOD CULTURE FOR BACTERIA: CPT

## 2020-10-18 PROCEDURE — 73630 X-RAY EXAM OF FOOT: CPT

## 2020-10-18 PROCEDURE — 93971 EXTREMITY STUDY: CPT

## 2020-10-18 PROCEDURE — 85025 COMPLETE CBC W/AUTO DIFF WBC: CPT

## 2020-10-18 PROCEDURE — 99285 EMERGENCY DEPT VISIT HI MDM: CPT

## 2020-10-18 PROCEDURE — 96374 THER/PROPH/DIAG INJ IV PUSH: CPT

## 2020-10-18 PROCEDURE — 36415 COLL VENOUS BLD VENIPUNCTURE: CPT

## 2020-10-18 PROCEDURE — 85610 PROTHROMBIN TIME: CPT

## 2020-10-18 NOTE — ER DOCUMENT REPORT
ED General





- General


Chief Complaint: Foot Pain


Stated Complaint: LEFT TOE PAIN/POST AMPUTATION


Time Seen by Provider: 10/18/20 13:56


Primary Care Provider: 


ERIKA CHILD MD [Primary Care Provider] - Follow up as needed


TRAVEL OUTSIDE OF THE [place] IN LAST 30 DAYS: No





- HPI


Notes: 





35-year-old female presents with left foot pain.  Patient underwent amputation 

of her left fifth toe on 9/25 for infected diabetic ulcer.  She states she has 

been doing well outpatient.  She has a wound vac in place, home health has been 

changing every M/W/F.  She states that she has been ambulatory except for 

yesterday due to new pain.  She states she has pain to the top of her left foot 

and it radiates upwards.  She describes the pain as hurting.  She states that 

she had a fever yesterday, temperature 99F.  No increased amount of fluid into 

the wound vac.  She sees her surgeon tomorrow for follow-up.  She is not 

currently on any antibiotics.  States her second toe has been healing well.





- Related Data


Allergies/Adverse Reactions: 


                                        





No Known Allergies Allergy (Verified 10/18/20 13:50)


   











Past Medical History





- General


Information source: Patient





- Social History


Smoking Status: Never Smoker


Chew tobacco use (# tins/day): No


Drug Abuse: None


Family History: Reviewed & Not Pertinent, Hypertension


Patient has homicidal ideation: No





- Past Medical History


Cardiac Medical History: Reports: Hx Hypertension


   Denies: Hx Coronary Artery Disease, Hx Heart Attack


Pulmonary Medical History: 


   Denies: Hx Asthma, Hx Bronchitis, Hx COPD, Hx Pneumonia


Neurological Medical History: Denies: Hx Cerebrovascular Accident, Hx Seizures


Endocrine Medical History: Reports: Hx Diabetes Mellitus Type 2


Renal/ Medical History: Denies: Hx Peritoneal Dialysis


Musculoskeletal Medical History: Denies Hx Arthritis


Psychiatric Medical History: 


   Denies: Hx Depression


Past Surgical History: Reports: Hx Gynecologic Surgery - D&C 2019





- Immunizations


Hx Diphtheria, Pertussis, Tetanus Vaccination: No





Review of Systems





- Review of Systems


Constitutional: Fever


EENT: No symptoms reported


Cardiovascular: No symptoms reported


Respiratory: No symptoms reported


Gastrointestinal: No symptoms reported


Genitourinary: No symptoms reported


Musculoskeletal: See HPI


Skin: See HPI


Hematologic/Lymphatic: No symptoms reported


Neurological/Psychological: denies: Weakness, Numbness





Physical Exam





- Vital signs


Vitals: 


                                        











Temp Pulse Resp BP Pulse Ox


 


 98.9 F   111 H  20   155/96 H  99 


 


 10/18/20 13:48  10/18/20 13:48  10/18/20 13:48  10/18/20 13:48  10/18/20 13:48














- General


General appearance: Appears well, Alert


In distress: None





- HEENT


Head: Normocephalic, Atraumatic


Extraocular movements intact: Yes


Pupils: PERRL





- Respiratory


Respiratory status: No respiratory distress





- Cardiovascular


Rhythm: Regular


Pulses: Normal: Dorsalis pedis


Normal capillary refill: Yes





- Abdominal


Inspection: Obese





- Extremities


Notes: 





There is mild swelling to the dorsum of the left foot, there is no erythema or 

increased warmth.  Mild tenderness to the distal left calf.  A wound VAC is in 

place, it is draining a clear fluid.  The visible wound appears to have 

granulation tissue, no gina purulence.  No tenderness when palpating to the 

wound.  Intact range of motion to left foot/ankle





- Neurological


Neuro grossly intact: Yes


Cognition: Normal


Orientation: AAOx4





- Psychological


Associated symptoms: Normal affect





- Skin


Skin Temperature: Warm


Notes: 





Evidence of a well healed ulcer to left second toe





Course





- Re-evaluation


Re-evalutation: 





35-year-old female status post fifth metatarsal amputation on 9/25 here with new

pain to her foot, onset yesterday.  Patient is afebrile, hemodynamically stable,

well-appearing, nontoxic.  There is some mild swelling to the dorsum of the left

foot, there is no erythema or increased warmth to suggest a cellulitis.  Visible

wound appears to be well-healing, no gina purulence, no tenderness to the 

wound.  Given her recent surgery, will obtain ultrasound to assess for DVT.  Via

the triage process she had labs and x-ray done.  No leukocytosis or left shift, 

electrolytes within normal limits, no elevation of lactic acid.  X-ray does not 

demonstrate acute changes suggestive of osteomyelitis at this time.  Toradol 

ordered for pain.


10/18/20 18:23


Ultrasound is negative for DVT.  I updated the patient on results.  Heart rate 

84.  Given that she has had a new onset swelling, it is possible that may be an 

early infection is taking place.  Not have a concern for a severe deep space 

infection at that time.  I prescribed her Keflex.  Encouraged her to please keep

her surgical follow-up appointment tomorrow and to discuss continuation of 

antibiotics.  Return precautions given, patient stable at time of discharge.





- Vital Signs


Vital signs: 


                                        











Temp Pulse Resp BP Pulse Ox


 


 98.9 F   111 H  16   134/87 H  100 


 


 10/18/20 13:48  10/18/20 13:48  10/18/20 17:31  10/18/20 17:31  10/18/20 17:31














- Laboratory


Result Diagrams: 


                                 10/18/20 14:17





                                 10/18/20 14:17


Laboratory results interpreted by me: 


                                        











  10/18/20 10/18/20





  14:17 14:17


 


Hgb  11.4 L 


 


Hct  33.1 L 


 


MCV  71 L 


 


MCH  24.7 L 


 


RDW  16.7 H 


 


Sodium   135.4 L


 


Glucose   231 H


 


Total Protein   8.5 H














- Diagnostic Test


Radiology reviewed: Image reviewed, Reports reviewed





Discharge





- Discharge


Clinical Impression: 


 Localized swelling of left foot





Disposition: HOME, SELF-CARE


Additional Instructions: 


Please follow-up with your surgeon as planned tomorrow.  Have wrote for a course

of Keflex for possible early infection.  Please discuss this tomorrow.  Please 

return to the emergency department for any concerning worsening symptoms.


Prescriptions: 


Cephalexin Monohydrate [Keflex 500 mg Capsule] 500 mg PO Q6H 5 Days #20 capsule


Referrals: 


ERIKA CHILD MD [Primary Care Provider] - Follow up as needed

## 2020-10-18 NOTE — RADIOLOGY REPORT (SQ)
EXAM DESCRIPTION:  FOOT LEFT COMPLETE



IMAGES COMPLETED DATE/TIME:  10/18/2020 1:41 pm



REASON FOR STUDY:  Left foot pain; eval osteomyelitis



COMPARISON:  9/23/2020



NUMBER OF VIEWS:  Three views.



TECHNIQUE:  AP, lateral and oblique  radiographic images acquired of the left foot.



LIMITATIONS:  None.



FINDINGS:  MINERALIZATION: Normal.

BONES: There is been interval resection of the distal metaphysis 5th digit metatarsal and distal phal
anges.  Distal amputation site demonstrates mild lucency probably postsurgical change.  No other lyti
c or blastic bone lesion.  Moderate osteoarthritis of the midfoot.  Small plantar calcaneal spur and 
calcifications of the plantar fascia, stable.

JOINTS: No effusions.

SOFT TISSUES: There is soft tissue thickening and edema overlying the 5th digit, consistent with rece
nt surgical procedure.  Diffuse subcutaneous edema.

OTHER: No other significant finding.



IMPRESSION:  Interval amputation of the distal 5th digit metatarsal and phalanges.  Expected postoper
ative changes.  Soft tissue swelling.  No radiographic evidence of osteomyelitis.



TECHNICAL DOCUMENTATION:  JOB ID:  [id number]

 2011 [employer]- All Rights Reserved



Reading location - IP/workstation name: [email or web address]

## 2020-10-18 NOTE — ER DOCUMENT REPORT
ED Medical Screen (RME)





- General


Chief Complaint: Foot Pain


Stated Complaint: LEFT TOE PAIN/POST AMPUTATION


Time Seen by Provider: 10/18/20 13:56


Primary Care Provider: 


ERIKA CHILD MD [Primary Care Provider] - Follow up as needed


Notes: 





Patient is a 35-year-old female who presents emergency department with a chief 

complaint of left foot pain that radiates up her left leg.  About 3 weeks ago, 

the patient had surgery for a diabetic foot ulcer.  She also has cellulitis.  

Patient denies any fever, body aches, or chills.  Patient has a wound VAC to her

left foot.





Exam: Wound VAC noted.  Small amount of purulent drainage noted to webspace of 

fifth and fourth digit and left toe.





I have greeted and performed a rapid initial assessment of this patient.  A 

comprehensive ED assessment and evaluation of the patient, analysis of test 

results and completion of medical decision making process will be conducted by 

an additional ED providers.


TRAVEL OUTSIDE OF THE [place] IN LAST 30 DAYS: No





- Related Data


Allergies/Adverse Reactions: 


                                        





No Known Allergies Allergy (Verified 10/18/20 13:50)


   











Past Medical History





- Social History


Chew tobacco use (# tins/day): No


Drug Abuse: None





- Past Medical History


Cardiac Medical History: Reports: Hx Hypertension


   Denies: Hx Coronary Artery Disease, Hx Heart Attack


Pulmonary Medical History: 


   Denies: Hx Asthma, Hx Bronchitis, Hx COPD, Hx Pneumonia


Neurological Medical History: Denies: Hx Cerebrovascular Accident, Hx Seizures


Endocrine Medical History: Reports: Hx Diabetes Mellitus Type 2


Renal/ Medical History: Denies: Hx Peritoneal Dialysis


Musculoskeltal Medical History: Denies Hx Arthritis


Psychiatric Medical History: 


   Denies: Hx Depression


Past Surgical History: Reports: Hx Gynecologic Surgery - D&C 2019





- Immunizations


Hx Diphtheria, Pertussis, Tetanus Vaccination: No





Physical Exam





- Vital signs


Vitals: 





                                        











Temp Pulse Resp BP Pulse Ox


 


 98.9 F   111 H  20   155/96 H  99 


 


 10/18/20 13:48  10/18/20 13:48  10/18/20 13:48  10/18/20 13:48  10/18/20 13:48














Course





- Vital Signs


Vital signs: 





                                        











Temp Pulse Resp BP Pulse Ox


 


 98.9 F   111 H  20   155/96 H  99 


 


 10/18/20 13:48  10/18/20 13:48  10/18/20 13:48  10/18/20 13:48  10/18/20 13:48














Doctor's Discharge





- Discharge


Referrals: 


ERIKA CHILD MD [Primary Care Provider] - Follow up as needed

## 2020-10-18 NOTE — RADIOLOGY REPORT (SQ)
EXAM DESCRIPTION:  VENOUS UNILATERAL LOWER



IMAGES COMPLETED DATE/TIME:  10/18/2020 4:56 pm



REASON FOR STUDY:  LEFT LE PAIN.  Recent left 5th digit amputation.



COMPARISON:  None.



TECHNIQUE:  Dynamic and static gray scale and color images acquired of the left leg venous system. Se
lected spectral images acquired with additional compression and augmentation maneuvers. The contralat
eral common femoral vein and saphenofemoral junction were also imaged. Images stored on PACS.



LIMITATIONS:  None.



FINDINGS:  COMMON FEMORAL: Normal phasicity, compression and augmentation. No visualized echogenic ma
terial on gray scale. No defects on color images.

FEMORAL: Normal compression and augmentation. No visualized echogenic material on gray scale. No defe
cts on color images.

POPLITEAL: Normal compression, augmentation. No visualized echogenic material on gray scale. No defec
ts on color images.

CALF VESSELS: Normal compression, augmentation. No visualized echogenic material on gray scale. No de
fects on color images.

GSV and SSV: Normal compression, augmentation. No visualized echogenic material on gray scale. No def
ects on color images.

ANY DEEP VENOUS INSUFFICIENCY: Not evaluated.

ANY EVIDENCE OF POPLITEAL CYST: No.

OTHER: There is an enlarged left inguinal lymph node measuring 4.6 x 2 x 3.8 cm.  This has a thickene
d cortex loss of normal fatty hilum.  Normal vasculature.

CONTRALATERAL COMMON FEMORAL VEIN AND SAPHENOFEMORAL JUNCTION:

Normal phasicity, compression and augmentation. No visualized echogenic material on gray scale. No de
fects on color images.



IMPRESSION:

1. No DVT or SVT in the left lower extremity.

2. Enlarged left inguinal lymph node with thickened cortex.  Given patient's recent surgical procedur
e this is likely reactive.  A follow-up ultrasound in 6-8 weeks could be performed to confirm resolut
ion.



TECHNICAL DOCUMENTATION:  JOB ID:  [id number]

 2011 [employer]- All Rights Reserved



Reading location - IP/workstation name: [email or web address]

## 2020-11-23 ENCOUNTER — HOSPITAL ENCOUNTER (OUTPATIENT)
Dept: HOSPITAL 62 - SP | Age: 35
End: 2020-11-23
Attending: PODIATRIST
Payer: COMMERCIAL

## 2020-11-23 DIAGNOSIS — L97.522: Primary | ICD-10-CM

## 2020-11-23 PROCEDURE — 93922 UPR/L XTREMITY ART 2 LEVELS: CPT

## 2020-11-23 PROCEDURE — 93925 LOWER EXTREMITY STUDY: CPT

## 2020-11-23 NOTE — RADIOLOGY REPORT (SQ)
EXAM DESCRIPTION:  ARTERIAL LOWER EXTREM BILAT



IMAGES COMPLETED DATE/TIME:  11/23/2020 4:00 pm



REASON FOR STUDY:  LT FOOT ULCER L97.522  NON-PRS CHRONIC ULCER OTH PRT LEFT FOOT W FAT LAYER



COMPARISON:  None.



TECHNIQUE:  Dynamic and static gray scale and color images acquired of the lower extremity arteries. 
Additional selected spectral images recorded.   ABIs recorded.



LIMITATIONS:  None.



FINDINGS:  RIGHT LEG:

ABIS: Normal, over 1.0.

INFLOW ARTERIES: Normal, no obstruction evident.

FEMORAL ARTERIES:Multiphasic waveforms. Normal, no velocity elevation to suggest focal stenosis. Norm
al color Doppler evaluation.  No aneurysm.

POPLITEAL ARTERY:Multiphasic waveforms. Normal, no velocity elevation to suggest focal stenosis. Norm
al color Doppler evaluation.  No aneurysm.

PATENT TIBIOPERONEAL TRUNK AND 3 VESSEL RUNOFF: Yes, normal vessels.

TBI: Not performed.

OTHER: No other significant finding.

LEFT LEG:

ABIS: Normal, over 1.0.

INFLOW ARTERIES: Normal, no obstruction evident.

FEMORAL ARTERIES:Multiphasic waveforms. Normal, no velocity elevation to suggest focal stenosis. Norm
al color Doppler evaluation.  No aneurysm.

POPLITEAL ARTERY:Multiphasic waveforms. Normal, no velocity elevation to suggest focal stenosis. Norm
al color Doppler evaluation.  No aneurysm.

PATENT TIBIOPERONEAL TRUNK AND 3 VESSEL RUNOFF: Yes, normal vessels.

TBI: Not performed.

OTHER: No other significant finding.



IMPRESSION:  NORMAL BILATERAL LOWER EXTREMITY ARTERIAL DOPPLER WITH ABIs.



COMMENT:  ADA

NORMAL: Greater than 1.0

MINIMAL DISEASE: 0.9 to 1.0

CLAUDICATION: 0.5 to 0.9

SEVERE ARTERIAL DISEASE:  Less than 0.5

[facility] AND [facility]

NORMAL: Greater than 1.0 (1.2 If Heavy Calcifications)

NORMAL TO MILD ISCHEMIA: 0.8 to 1.0

MODERATE ISCHEMIA: 0.4 to 0.8

SEVERE ISCHEMIA:  Less than 0.4



TECHNICAL DOCUMENTATION:  JOB ID:  [id number]

 2011 [employer]- All Rights Reserved



Reading location - IP/workstation name: [email or web address]

## 2020-11-23 NOTE — RADIOLOGY REPORT (SQ)
EXAM DESCRIPTION:  PHYSIO ARTERIAL LTD



COMPLETE DATE/TIME:  11/23/2020 4:00 pm



REASON FOR STUDY:  LT FOOT ULCER L97.522  NON-PRS CHRONIC ULCER OTH PRT LEFT FOOT W FAT LAYER



FINDINGS:  Please see combined report for performance of procedure and radiologic supervision and int
erpretation.



IMPRESSION:  Please see combined report for performance of procedure and radiologic supervision and i
nterpretation.



Reading location - IP/workstation name: [email or web address]

## 2020-12-03 ENCOUNTER — HOSPITAL ENCOUNTER (INPATIENT)
Dept: HOSPITAL 62 - ER | Age: 35
LOS: 7 days | Discharge: HOME HEALTH | DRG: 616 | End: 2020-12-10
Attending: INTERNAL MEDICINE | Admitting: INTERNAL MEDICINE
Payer: COMMERCIAL

## 2020-12-03 DIAGNOSIS — E66.01: ICD-10-CM

## 2020-12-03 DIAGNOSIS — E11.69: Primary | ICD-10-CM

## 2020-12-03 DIAGNOSIS — D63.8: ICD-10-CM

## 2020-12-03 DIAGNOSIS — B95.1: ICD-10-CM

## 2020-12-03 DIAGNOSIS — I10: ICD-10-CM

## 2020-12-03 DIAGNOSIS — L08.89: ICD-10-CM

## 2020-12-03 DIAGNOSIS — A41.9: ICD-10-CM

## 2020-12-03 DIAGNOSIS — E11.628: ICD-10-CM

## 2020-12-03 DIAGNOSIS — Z83.3: ICD-10-CM

## 2020-12-03 DIAGNOSIS — Z20.828: ICD-10-CM

## 2020-12-03 DIAGNOSIS — Z79.899: ICD-10-CM

## 2020-12-03 DIAGNOSIS — E11.65: ICD-10-CM

## 2020-12-03 DIAGNOSIS — Z79.4: ICD-10-CM

## 2020-12-03 DIAGNOSIS — M86.172: ICD-10-CM

## 2020-12-03 DIAGNOSIS — Z89.422: ICD-10-CM

## 2020-12-03 LAB
ADD MANUAL DIFF: NO
ALBUMIN SERPL-MCNC: 3.7 G/DL (ref 3.5–5)
ALP SERPL-CCNC: 86 U/L (ref 38–126)
ANION GAP SERPL CALC-SCNC: 9 MMOL/L (ref 5–19)
AST SERPL-CCNC: 13 U/L (ref 14–36)
BASOPHILS # BLD AUTO: 0 10^3/UL (ref 0–0.2)
BASOPHILS NFR BLD AUTO: 0.3 % (ref 0–2)
BILIRUB DIRECT SERPL-MCNC: 0.2 MG/DL (ref 0–0.4)
BILIRUB SERPL-MCNC: 0.7 MG/DL (ref 0.2–1.3)
BUN SERPL-MCNC: 7 MG/DL (ref 7–20)
CALCIUM: 9.4 MG/DL (ref 8.4–10.2)
CHLORIDE SERPL-SCNC: 98 MMOL/L (ref 98–107)
CO2 SERPL-SCNC: 27 MMOL/L (ref 22–30)
EOSINOPHIL # BLD AUTO: 0.1 10^3/UL (ref 0–0.6)
EOSINOPHIL NFR BLD AUTO: 0.5 % (ref 0–6)
ERYTHROCYTE [DISTWIDTH] IN BLOOD BY AUTOMATED COUNT: 17.4 % (ref 11.5–14)
GLUCOSE SERPL-MCNC: 220 MG/DL (ref 75–110)
HCT VFR BLD CALC: 27.5 % (ref 36–47)
HGB BLD-MCNC: 9.2 G/DL (ref 12–15.5)
LYMPHOCYTES # BLD AUTO: 1.8 10^3/UL (ref 0.5–4.7)
LYMPHOCYTES NFR BLD AUTO: 15.6 % (ref 13–45)
MCH RBC QN AUTO: 23.2 PG (ref 27–33.4)
MCHC RBC AUTO-ENTMCNC: 33.4 G/DL (ref 32–36)
MCV RBC AUTO: 70 FL (ref 80–97)
MONOCYTES # BLD AUTO: 1.2 10^3/UL (ref 0.1–1.4)
MONOCYTES NFR BLD AUTO: 9.7 % (ref 3–13)
NEUTROPHILS # BLD AUTO: 8.8 10^3/UL (ref 1.7–8.2)
NEUTS SEG NFR BLD AUTO: 73.9 % (ref 42–78)
PLATELET # BLD: 471 10^3/UL (ref 150–450)
POTASSIUM SERPL-SCNC: 4.2 MMOL/L (ref 3.6–5)
PROT SERPL-MCNC: 8.4 G/DL (ref 6.3–8.2)
RBC # BLD AUTO: 3.95 10^6/UL (ref 3.72–5.28)
TOTAL CELLS COUNTED % (AUTO): 100 %
WBC # BLD AUTO: 11.9 10^3/UL (ref 4–10.5)

## 2020-12-03 PROCEDURE — 87205 SMEAR GRAM STAIN: CPT

## 2020-12-03 PROCEDURE — 93005 ELECTROCARDIOGRAM TRACING: CPT

## 2020-12-03 PROCEDURE — 93010 ELECTROCARDIOGRAM REPORT: CPT

## 2020-12-03 PROCEDURE — 36430 TRANSFUSION BLD/BLD COMPNT: CPT

## 2020-12-03 PROCEDURE — C9803 HOPD COVID-19 SPEC COLLECT: HCPCS

## 2020-12-03 PROCEDURE — 82962 GLUCOSE BLOOD TEST: CPT

## 2020-12-03 PROCEDURE — 99285 EMERGENCY DEPT VISIT HI MDM: CPT

## 2020-12-03 PROCEDURE — 96365 THER/PROPH/DIAG IV INF INIT: CPT

## 2020-12-03 PROCEDURE — 87077 CULTURE AEROBIC IDENTIFY: CPT

## 2020-12-03 PROCEDURE — 80053 COMPREHEN METABOLIC PANEL: CPT

## 2020-12-03 PROCEDURE — 86900 BLOOD TYPING SEROLOGIC ABO: CPT

## 2020-12-03 PROCEDURE — 99140 ANES COMP EMERGENCY COND: CPT

## 2020-12-03 PROCEDURE — 86850 RBC ANTIBODY SCREEN: CPT

## 2020-12-03 PROCEDURE — 87070 CULTURE OTHR SPECIMN AEROBIC: CPT

## 2020-12-03 PROCEDURE — 83036 HEMOGLOBIN GLYCOSYLATED A1C: CPT

## 2020-12-03 PROCEDURE — 83605 ASSAY OF LACTIC ACID: CPT

## 2020-12-03 PROCEDURE — 85025 COMPLETE CBC W/AUTO DIFF WBC: CPT

## 2020-12-03 PROCEDURE — 80061 LIPID PANEL: CPT

## 2020-12-03 PROCEDURE — 88305 TISSUE EXAM BY PATHOLOGIST: CPT

## 2020-12-03 PROCEDURE — P9016 RBC LEUKOCYTES REDUCED: HCPCS

## 2020-12-03 PROCEDURE — 87040 BLOOD CULTURE FOR BACTERIA: CPT

## 2020-12-03 PROCEDURE — 86140 C-REACTIVE PROTEIN: CPT

## 2020-12-03 PROCEDURE — 80048 BASIC METABOLIC PNL TOTAL CA: CPT

## 2020-12-03 PROCEDURE — 88311 DECALCIFY TISSUE: CPT

## 2020-12-03 PROCEDURE — 86901 BLOOD TYPING SEROLOGIC RH(D): CPT

## 2020-12-03 PROCEDURE — 36573 INSJ PICC RS&I 5 YR+: CPT

## 2020-12-03 PROCEDURE — 86920 COMPATIBILITY TEST SPIN: CPT

## 2020-12-03 PROCEDURE — 80202 ASSAY OF VANCOMYCIN: CPT

## 2020-12-03 PROCEDURE — 01480 ANES OPEN PX LOWER L/A/F NOS: CPT

## 2020-12-03 PROCEDURE — 87150 DNA/RNA AMPLIFIED PROBE: CPT

## 2020-12-03 PROCEDURE — 36415 COLL VENOUS BLD VENIPUNCTURE: CPT

## 2020-12-03 PROCEDURE — 87075 CULTR BACTERIA EXCEPT BLOOD: CPT

## 2020-12-03 RX ADMIN — SODIUM CHLORIDE PRN MLS/HR: 9 INJECTION, SOLUTION INTRAVENOUS at 21:19

## 2020-12-03 RX ADMIN — INSULIN LISPRO SCH UNIT: 100 INJECTION, SOLUTION INTRAVENOUS; SUBCUTANEOUS at 21:28

## 2020-12-03 RX ADMIN — SODIUM CHLORIDE PRN MLS/HR: 9 INJECTION, SOLUTION INTRAVENOUS at 17:01

## 2020-12-03 RX ADMIN — SODIUM CHLORIDE PRN MLS/HR: 9 INJECTION, SOLUTION INTRAVENOUS at 18:44

## 2020-12-03 RX ADMIN — OXYCODONE AND ACETAMINOPHEN PRN TAB: 5; 325 TABLET ORAL at 23:38

## 2020-12-03 NOTE — EKG REPORT
SEVERITY:- OTHERWISE NORMAL ECG -

SINUS TACHYCARDIA

:

Confirmed by: Noe Orozco MD 03-Dec-2020 17:41:29

## 2020-12-03 NOTE — PDOC H&P
History of Present Illness


Admission Date/PCP: 


  12/03/20 17:24





  [facility]





Patient complains of: Left foot pain


History of Present Illness: 


CHEPE SCHMITT is a 35 year old female known to my practice who was referred to 

the ED for further evaluation due to worsening pain in her left foot. Patient 

reported that she had episode of fever 3 days prior to her presentation and 

developed pain in her left foot that worsen over last 2 days. She denied any 

instrumentation, trauma, or injury preceding her symptoms onset. She reported 

compliance with her left foot post 5th toe amputation dressing at home. Her home

blood glucose level have been satisfactory until 3 days ago when it has remained

above 200 mg/dL. Asael reported compliance with her medication and dietary 

restrictions. She denied any nausea, vomiting, abdominal pain, or diarrhea. No 

chest pain or difficulty with her breathing. Her initial ED evaluation was 

significant for worsening left foot open wound, leukocytosis and X ray 

suggestive of osteomyelitis involving the 5th metatarsal bone. Her morbidities 

are as listed below. She was advised hospitalization for further evaluation and 

management. She will need surgical consultation for possible intervention.








Past Medical History


Cardiac Medical History: Reports: Hypertension


   Denies: Coronary Artery Disease, Myocardial Infarction


Pulmonary Medical History: 


   Denies: Asthma, Bronchitis, Chronic Obstructive Pulmonary Disease (COPD), 

Pneumonia


Neurological Medical History: 


   Denies: Seizures


Endocrine Medical History: Reports: Diabetes Mellitus Type 2


Musculoskeltal Medical History: 


   Denies: Arthritis


Psychiatric Medical History: 


   Denies: Depression


Hematology: 


   Denies: Anemia





Social History


Smoking Status: Never Smoker


Electronic Cigarette use?: No


Frequency of Alcohol Use: None


Hx Recreational Drug Use: No


Drugs: None


Hx Prescription Drug Abuse: No





Family History


Family History: Reviewed & Not Pertinent, Hypertension


Parental Family History Reviewed: Yes


Children Family History Reviewed: Yes


Sibling(s) Family History Reviewed.: Yes





Medication/Allergy


Home Medications: 








Metformin HCl 1,000 mg PO BID #60 tablet 03/07/19 


Amlodipine Besylate [Norvasc 10 mg Tablet] 10 mg PO DAILY 09/23/20 


Ferrous Sulfate [Feosol 325 mg Tablet] 325 mg PO DAILY 09/23/20 


Hydrochlorothiazide [Hydrodiuril 12.5 mg Tablet] 12.5 mg PO DAILY 09/23/20 


Insulin NPH Human Isophane [Novolin N Flexpen] 30 units SQ QPM 09/23/20 


Insulin NPH Human Isophane [Novolin N Flexpen] 50 units SQ QAM 09/23/20 


Insulin Regular, Human [Novolin R] 0 units SQ .PERSLIDINGSCALE MDD AT LUNCH 

09/23/20 


Irbesartan 300 mg PO DAILY 09/23/20 


Lactobacillus Acidophilus/Fos [Acidophilus Probiotic Tablet] 1 tab PO BID 

09/23/20 


Tramadol HCl [Ultram 50 mg Tablet] 50 mg PO Q6HP PRN #60 tablet 09/30/20 








Allergies/Adverse Reactions: 


                                        





No Known Allergies Allergy (Verified 12/03/20 14:40)


   











Review of Systems


Constitutional: PRESENT: fever(s).  ABSENT: chills, headache(s)


Eyes: ABSENT: visual disturbances


Ears: ABSENT: hearing changes


Cardiovascular: ABSENT: chest pain, dyspnea on exertion, edema, orthropnea, pal

pitations


Respiratory: ABSENT: cough, hemoptysis


Gastrointestinal: ABSENT: abdominal pain, constipation, diarrhea, hematemesis, 

hematochezia, nausea, vomiting


Genitourinary: ABSENT: dysuria, hematuria


Musculoskeletal: ABSENT: joint swelling


Integumentary: PRESENT: wounds - left foot.  ABSENT: rash


Neurological: ABSENT: abnormal gait, abnormal speech, confusion, dizziness, 

focal weakness, syncope


Psychiatric: ABSENT: anxiety, depression, homidical ideation, suicidal ideation


Endocrine: ABSENT: cold intolerance, heat intolerance, menstrual abnormalities, 

polydipsia, polyuria


Hematologic/Lymphatic: ABSENT: easy bleeding, easy bruising, lymphadenopathy





Physical Exam


Vital Signs: 


                                        











Temp Pulse Resp BP Pulse Ox


 


 100.3 F   139 H  25 H  119/52 L  98 


 


 12/03/20 16:39  12/03/20 14:05  12/03/20 18:03  12/03/20 18:03  12/03/20 18:03








                                 Intake & Output











 12/02/20 12/03/20 12/04/20





 06:59 06:59 06:59


 


Intake Total   1000


 


Balance   1000


 


Weight   153.7 kg











General appearance: PRESENT: no acute distress, morbidly obese


Head exam: PRESENT: atraumatic, normocephalic


Eye exam: PRESENT: conjunctiva pink, EOMI, PERRLA.  ABSENT: scleral icterus


Ear exam: PRESENT: normal external ear exam


Mouth exam: PRESENT: moist, tongue midline


Neck exam: PRESENT: full ROM.  ABSENT: carotid bruit, JVD, lymphadenopathy, 

thyromegaly


Respiratory exam: PRESENT: clear to auscultation kim


Cardiovascular exam: PRESENT: RRR, +S1, +S2.  ABSENT: diastolic murmur, rubs, 

systolic murmur


Pulses: PRESENT: normal dorsalis pedis pul, +2 pedal pulses bilateral


Vascular exam: PRESENT: normal capillary refill.  ABSENT: pallor


GI/Abdominal exam: PRESENT: normal bowel sounds, soft.  ABSENT: distended, 

guarding, mass, organolmegaly, rebound, tenderness


Rectal exam: PRESENT: deferred


Extremities exam: PRESENT: pedal edema - left foot and lower leg regions, 

tenderness - left foot to palpation and examination manipulation


Musculoskeletal exam: PRESENT: tenderness - in left foot


Neurological exam: PRESENT: alert, awake, oriented to person, oriented to place,

oriented to time, oriented to situation, CN II-XII grossly intact.  ABSENT: 

motor sensory deficit


Psychiatric exam: PRESENT: appropriate affect, normal mood.  ABSENT: homicidal 

ideation, suicidal ideation


Skin exam: PRESENT: dry, mottled - left foot lateral aspect, warm.  ABSENT: 

cyanosis, intact - open wound involving lateral as-ect of left foot soft tissue 

structure., rash





Results


Laboratory Results: 


                                        





                                 12/03/20 15:10 





                                 12/03/20 15:10 





                                        











  12/03/20 12/03/20 12/03/20





  15:10 15:10 15:10


 


WBC  11.9 H  


 


RBC  3.95  


 


Hgb  9.2 L  


 


Hct  27.5 L  


 


MCV  70 L  


 


MCH  23.2 L  


 


MCHC  33.4  


 


RDW  17.4 H  


 


Plt Count  471 H  


 


Seg Neutrophils %  73.9  


 


Sodium   134.3 L 


 


Potassium   4.2 


 


Chloride   98 


 


Carbon Dioxide   27 


 


Anion Gap   9 


 


BUN   7 


 


Creatinine   0.70 


 


Est GFR ( Amer)   > 60 


 


Glucose   220 H 


 


Lactic Acid    2.3 H


 


Calcium   9.4 


 


Total Bilirubin   0.7 


 


AST   13 L 


 


Alkaline Phosphatase   86 


 


C-Reactive Protein   


 


Total Protein   8.4 H 


 


Albumin   3.7 














  12/03/20





  15:10


 


WBC 


 


RBC 


 


Hgb 


 


Hct 


 


MCV 


 


MCH 


 


MCHC 


 


RDW 


 


Plt Count 


 


Seg Neutrophils % 


 


Sodium 


 


Potassium 


 


Chloride 


 


Carbon Dioxide 


 


Anion Gap 


 


BUN 


 


Creatinine 


 


Est GFR (African Amer) 


 


Glucose 


 


Lactic Acid 


 


Calcium 


 


Total Bilirubin 


 


AST 


 


Alkaline Phosphatase 


 


C-Reactive Protein  252.6 H


 


Total Protein 


 


Albumin 











Impressions: 


                                        





Foot X-Ray  12/03/20 14:49


IMPRESSION:  Osteomyelitis in the 5th metatarsal.


 














Assessment & Plan





- Diagnosis


(1) Osteomyelitis of foot, left, acute


Is this a current diagnosis for this admission?: Yes   


Plan: 


See admitting attending physician orders for details about care plan.








(2) Probable sepsis


Is this a current diagnosis for this admission?: Yes   


Plan: 


See admitting attending physician orders for details about care plan.








(3) Uncontrolled type 2 diabetes mellitus


Qualifiers: 


   Glycemic state: with hyperglycemia   Qualified Code(s): E11.65 - Type 2 

diabetes mellitus with hyperglycemia   


Is this a current diagnosis for this admission?: Yes   


Plan: 


See admitting attending physician orders for details about care plan.








(4) HTN (hypertension)


Qualifiers: 


   Hypertension type: essential hypertension   Qualified Code(s): I10 - 

Essential (primary) hypertension   


Is this a current diagnosis for this admission?: Yes   


Plan: 


See admitting attending physician orders for details about care plan.








(5) Morbid (severe) obesity due to excess calories


Is this a current diagnosis for this admission?: Yes   


Plan: 


See admitting attending physician orders for details about care plan.








- Time


Time Spent: 50 to 70 Minutes


Medications reviewed and adjusted accordingly: Yes


Anticipated Discharge Disposition: Home with Home Health


Anticipated Discharge Timeframe: within 72 hours





- Inpatient Certification


Based on my medical assessment, after consideration of the patient's 

comorbidities, presenting symptoms, or acuity I expect that the services needed 

warrant INPATIENT care.: Yes


I certify that my determination is in accordance with my understanding of 

Medicare's requirements for reasonable and necessary INPATIENT services [42 CFR 

412.3e].: Yes


Medical Necessity: Significant Comorbidiites Make Outpatient Treatment Too 

Risky, Need Close Monitoring Due to Risk of Patient Decompensation, Need For IV 

Fluids, Need For Continuous Telemetry Monitoring, Need for Pain Control, Need 

for IV Antibiotics, Need for Surgery, Risk of Complication if Not Cared For in H

ospital, Risk of Diagnosis Which Will Require Inpatient Eval/Care/Monitoring


Post Hospital Care: D/C Planner Documentation





- Plan Summary


Plan Summary: 





See admitting attending physician orders for details about care plan.

## 2020-12-03 NOTE — ER DOCUMENT REPORT
ED General





- General


Chief Complaint: Abscess


Stated Complaint: POSSIBLE ABSCESS/FEVER


Time Seen by Provider: 12/03/20 14:41


Primary Care Provider: 


BASHIR BEVERLY DPM [ACTIVE STAFF] - Follow up as needed


Mode of Arrival: Wheelchair


Information source: Patient


TRAVEL OUTSIDE OF THE [place] IN LAST 30 DAYS: No





- HPI


Patient complains to provider of: Pain and swelling the left foot


Notes: 





This patient is a 35-year-old female with poorly controlled type 2 diabetes and 

hypertension.  She has had painful swelling of her left foot for more than a 

week.  She had an amputation of her left fifth toe due to gangrene in September 

of this year.  She is followed by a local podiatrist.  Unfortunately her wound 

has broken down and ulcerated her foot is now infected again.  She has been 

running fevers intermittently as high as 103 degrees orally.  She denies any 

chills.  She does feel some malaise.  She states she has been taking her 

medications as prescribed.  Evidently she was seen in her podiatrist office jenise ortiz today and was told to present here to the emergency department for 

possible admission because of the worsening condition of her foot.  She denies 

any other illnesses or symptoms.  She is otherwise in her usual state of health.





- Related Data


Allergies/Adverse Reactions: 


                                        





No Known Allergies Allergy (Verified 12/03/20 14:40)


   











Past Medical History





- General


Information source: Patient





- Social History


Smoking Status: Never Smoker


Chew tobacco use (# tins/day): No


Drug Abuse: None


Family History: Reviewed & Not Pertinent, Hypertension


Patient has homicidal ideation: No





- Medical History


Notes: 





Past medical history is reviewed as documented in the medical record.





- Past Medical History


Cardiac Medical History: Reports: Hx Hypertension


   Denies: Hx Coronary Artery Disease, Hx Heart Attack


Pulmonary Medical History: 


   Denies: Hx Asthma, Hx Bronchitis, Hx COPD, Hx Pneumonia


Neurological Medical History: Denies: Hx Cerebrovascular Accident, Hx Seizures


Endocrine Medical History: Reports: Hx Diabetes Mellitus Type 2


Renal/ Medical History: Denies: Hx Peritoneal Dialysis


Musculoskeletal Medical History: Denies Hx Arthritis


Psychiatric Medical History: 


   Denies: Hx Depression


Past Surgical History: Reports: Hx Gynecologic Surgery - D&C 2019





- Immunizations


Hx Diphtheria, Pertussis, Tetanus Vaccination: No





Review of Systems





- Review of Systems


Notes: 





Complete review of systems is normal or nondiagnostic except as noted in the 

history of present illness.





Physical Exam





- Vital signs


Vitals: 


                                        











Temp Pulse Resp BP Pulse Ox


 


 99.3 F   139 H  18   139/79 H  100 


 


 12/03/20 14:05  12/03/20 14:05  12/03/20 14:05  12/03/20 14:05  12/03/20 14:05














- Notes


Notes: 





General: Well-developed well-nourished obese female no acute distress.  Vital 

signs and nursing chief complaint are reviewed.


HEENT: Grossly normal to inspection.


Neck: Supple no adenopathy.


Lungs: Clear to auscultation all fields.


Heart: Regular rate and rhythm without murmur.


Abdomen: Obese soft nontender no vascular megaly rigidity or guarding.


Extremities: Patient has a deep ulcer along the surgical wound involving her 

left fifth metatarsal.  This is somewhat necrotic wound edges and some purulent 

discharge.  The foot itself is somewhat edematous and tender.  It is slightly 

warm to the touch.  Pulses are diminished but are palpable.


Neuro: Alert and oriented x3.  No focal neuro deficits noted.





Course





- Re-evaluation


Re-evalutation: 





12/03/20 17:13


Patient had Zosyn and vancomycin ordered by the provider in triage.  Labs and 

cultures were initiated.  After I evaluated her and determined that admission 

was in fact indicated.  I spoke to her primary care doctor who accepted her for 

admission and said he would come in and see her.





- Vital Signs


Vital signs: 


                                        











Temp Pulse Resp BP Pulse Ox


 


 100.3 F   139 H  20   139/79 H  100 


 


 12/03/20 16:39  12/03/20 14:05  12/03/20 16:39  12/03/20 14:05  12/03/20 16:39














- Laboratory


Result Diagrams: 


                                 12/03/20 15:10





                                 12/03/20 15:10


Laboratory results interpreted by me: 


                                        











  12/03/20 12/03/20 12/03/20





  15:10 15:10 15:10


 


WBC  11.9 H  


 


Hgb  9.2 L  


 


Hct  27.5 L  


 


MCV  70 L  


 


MCH  23.2 L  


 


RDW  17.4 H  


 


Plt Count  471 H  


 


Absolute Neuts (auto)  8.8 H  


 


Sodium   134.3 L 


 


Glucose   220 H 


 


Lactic Acid    2.3 H


 


AST   13 L 


 


Total Protein   8.4 H 














- Diagnostic Test


Radiology reviewed: Image reviewed, Reports reviewed


Radiology results interpreted by me: 





12/03/20 17:14





                                        





Foot X-Ray  12/03/20 14:49


IMPRESSION:  Osteomyelitis in the 5th metatarsal.


 














Discharge





- Discharge


Clinical Impression: 


 Cellulitis of foot, Osteomyelitis left fifth metatarsal





Type 2 diabetes mellitus with foot ulcer


Qualifiers:


 Diabetes mellitus long term insulin use: unspecified long term insulin use 

status Qualified Code(s): E11.621 - Type 2 diabetes mellitus with foot ulcer





Condition: Fair


Disposition: ADMITTED AS INPATIENT


Admitting Provider: Radhames


Unit Admitted: Medical Floor


Referrals: 


BASHIR BEVERLY DPM [ACTIVE STAFF] - Follow up as needed

## 2020-12-03 NOTE — ER DOCUMENT REPORT
ED Medical Screen (RME)





- General


Chief Complaint: Abscess


Stated Complaint: POSSIBLE ABSCESS/FEVER


Time Seen by Provider: 12/03/20 14:41


Primary Care Provider: 


BASHIR TUCKER DPM [Primary Care Provider] - Follow up as needed


TRAVEL OUTSIDE OF THE [place] IN LAST 30 DAYS: No





- HPI


Notes: 





12/03/20 14:58


35-year-old female with past medical history of diabetes and osteomyelitis of t

he left foot to the emergency department from wound care with Dr. Tucker for 

complaints of infection to the left foot.  She initially had her left second and

fifth toe amputated in September by Dr. Robert.  She is on antibiotics but has 

not recently been on any.  She is been going to the wound care clinic with Dr. Tucker.  She initially had a wound VAC but is now doing more regular dressing 

changes.  She states earlier in the week she started to develop a fever.  T-max 

was 103.  She states also that she started to notice increased swelling and pain

in the left foot.  She states when she went to see Dr. Tucker today he feels like

she may be has another abscess in the wound.  Brief medical screening exam 

patient has erythema, edema, and warmth to the dorsum of the left foot.  Noted 

per surgical wound to the lateral foot with drainage.  Noted vital signs with a 

heart rate of 139.  Concern for possible sepsis here.  Patient is nontoxic in 

appearance.  Went ahead and started sepsis protocol.  I performed a brief 

medical screening exam on the patient determined that the patient needs further 

evaluation and management by main side provider.  I have placed initial orders 

to help expedite care.





- Related Data


Allergies/Adverse Reactions: 


                                        





No Known Allergies Allergy (Verified 12/03/20 14:40)


   











Past Medical History





- Social History


Chew tobacco use (# tins/day): No


Drug Abuse: None





- Past Medical History


Cardiac Medical History: Reports: Hx Hypertension


   Denies: Hx Coronary Artery Disease, Hx Heart Attack


Pulmonary Medical History: 


   Denies: Hx Asthma, Hx Bronchitis, Hx COPD, Hx Pneumonia


Neurological Medical History: Denies: Hx Cerebrovascular Accident, Hx Seizures


Endocrine Medical History: Reports: Hx Diabetes Mellitus Type 2


Renal/ Medical History: Denies: Hx Peritoneal Dialysis


Musculoskeltal Medical History: Denies Hx Arthritis


Psychiatric Medical History: 


   Denies: Hx Depression


Past Surgical History: Reports: Hx Gynecologic Surgery - D&C 2019





- Immunizations


Hx Diphtheria, Pertussis, Tetanus Vaccination: No





Physical Exam





- Vital signs


Vitals: 





                                        











Temp Pulse Resp BP Pulse Ox


 


 99.3 F   139 H  18   139/79 H  100 


 


 12/03/20 14:05  12/03/20 14:05  12/03/20 14:05  12/03/20 14:05  12/03/20 14:05














Course





- Vital Signs


Vital signs: 





                                        











Temp Pulse Resp BP Pulse Ox


 


 99.3 F   139 H  18   139/79 H  100 


 


 12/03/20 14:05  12/03/20 14:05  12/03/20 14:05  12/03/20 14:05  12/03/20 14:05














Doctor's Discharge





- Discharge


Referrals: 


BASHIR TUCKER DPM [Primary Care Provider] - Follow up as needed

## 2020-12-03 NOTE — RADIOLOGY REPORT (SQ)
EXAM DESCRIPTION:  FOOT LEFT COMPLETE



IMAGES COMPLETED DATE/TIME:  12/3/2020 4:06 pm



REASON FOR STUDY:  left foot infection



COMPARISON:  None.



NUMBER OF VIEWS:  Three views.



TECHNIQUE:  AP, lateral and oblique  radiographic images acquired of the left foot.



LIMITATIONS:  None.



FINDINGS:  MINERALIZATION: Normal.

BONES: Amputation of the 5th digit from the mid 5th metatarsal.  Extensive bone destruction in the re
maining portion of the 5th metatarsal.

JOINTS: No effusions.

SOFT TISSUES: No soft tissue swelling.  No foreign body.

OTHER: No other significant finding.



IMPRESSION:  Osteomyelitis in the 5th metatarsal.



TECHNICAL DOCUMENTATION:  JOB ID:  [id number]

 2011 [employer]- All Rights Reserved



Reading location - IP/workstation name: [email or web address]

## 2020-12-04 LAB
ADD MANUAL DIFF: NO
ALBUMIN SERPL-MCNC: 2.9 G/DL (ref 3.5–5)
ALP SERPL-CCNC: 73 U/L (ref 38–126)
ANION GAP SERPL CALC-SCNC: 4 MMOL/L (ref 5–19)
AST SERPL-CCNC: 11 U/L (ref 14–36)
BASOPHILS # BLD AUTO: 0 10^3/UL (ref 0–0.2)
BASOPHILS NFR BLD AUTO: 0.4 % (ref 0–2)
BILIRUB DIRECT SERPL-MCNC: 0.2 MG/DL (ref 0–0.4)
BILIRUB SERPL-MCNC: 0.6 MG/DL (ref 0.2–1.3)
BUN SERPL-MCNC: 7 MG/DL (ref 7–20)
CALCIUM: 8.8 MG/DL (ref 8.4–10.2)
CHLORIDE SERPL-SCNC: 102 MMOL/L (ref 98–107)
CHOLEST SERPL-MCNC: 108.73 MG/DL (ref 0–200)
CO2 SERPL-SCNC: 29 MMOL/L (ref 22–30)
EOSINOPHIL # BLD AUTO: 0.2 10^3/UL (ref 0–0.6)
EOSINOPHIL NFR BLD AUTO: 1.4 % (ref 0–6)
ERYTHROCYTE [DISTWIDTH] IN BLOOD BY AUTOMATED COUNT: 17.4 % (ref 11.5–14)
GLUCOSE SERPL-MCNC: 204 MG/DL (ref 75–110)
HCT VFR BLD CALC: 24.1 % (ref 36–47)
HGB BLD-MCNC: 8.1 G/DL (ref 12–15.5)
LDLC SERPL DIRECT ASSAY-MCNC: 55 MG/DL (ref ?–100)
LYMPHOCYTES # BLD AUTO: 1.4 10^3/UL (ref 0.5–4.7)
LYMPHOCYTES NFR BLD AUTO: 12.9 % (ref 13–45)
MCH RBC QN AUTO: 23.2 PG (ref 27–33.4)
MCHC RBC AUTO-ENTMCNC: 33.4 G/DL (ref 32–36)
MCV RBC AUTO: 69 FL (ref 80–97)
MONOCYTES # BLD AUTO: 1.3 10^3/UL (ref 0.1–1.4)
MONOCYTES NFR BLD AUTO: 12 % (ref 3–13)
NEUTROPHILS # BLD AUTO: 7.8 10^3/UL (ref 1.7–8.2)
NEUTS SEG NFR BLD AUTO: 73.3 % (ref 42–78)
PLATELET # BLD: 374 10^3/UL (ref 150–450)
POTASSIUM SERPL-SCNC: 4.2 MMOL/L (ref 3.6–5)
PROT SERPL-MCNC: 7 G/DL (ref 6.3–8.2)
RBC # BLD AUTO: 3.48 10^6/UL (ref 3.72–5.28)
TOTAL CELLS COUNTED % (AUTO): 100 %
TRIGL SERPL-MCNC: 122 MG/DL (ref ?–150)
VLDLC SERPL CALC-MCNC: 24 MG/DL (ref 10–31)
WBC # BLD AUTO: 10.6 10^3/UL (ref 4–10.5)

## 2020-12-04 RX ADMIN — INSULIN LISPRO SCH UNIT: 100 INJECTION, SOLUTION INTRAVENOUS; SUBCUTANEOUS at 22:04

## 2020-12-04 RX ADMIN — VANCOMYCIN HYDROCHLORIDE SCH MLS/HR: 1 INJECTION, POWDER, LYOPHILIZED, FOR SOLUTION INTRAVENOUS at 11:19

## 2020-12-04 RX ADMIN — PANTOPRAZOLE SODIUM SCH MG: 40 TABLET, DELAYED RELEASE ORAL at 06:18

## 2020-12-04 RX ADMIN — FERROUS SULFATE TAB 325 MG (65 MG ELEMENTAL FE) SCH MG: 325 (65 FE) TAB at 11:18

## 2020-12-04 RX ADMIN — PIPERACILLIN AND TAZOBACTAM SCH MLS/HR: 3; .375 INJECTION, POWDER, LYOPHILIZED, FOR SOLUTION INTRAVENOUS; PARENTERAL at 06:18

## 2020-12-04 RX ADMIN — AMLODIPINE BESYLATE SCH MG: 10 TABLET ORAL at 11:18

## 2020-12-04 RX ADMIN — INSULIN LISPRO SCH: 100 INJECTION, SOLUTION INTRAVENOUS; SUBCUTANEOUS at 17:53

## 2020-12-04 RX ADMIN — ENOXAPARIN SODIUM SCH MG: 40 INJECTION SUBCUTANEOUS at 11:19

## 2020-12-04 RX ADMIN — INSULIN HUMAN SCH UNIT: 100 INJECTION, SUSPENSION SUBCUTANEOUS at 17:55

## 2020-12-04 RX ADMIN — METFORMIN HYDROCHLORIDE SCH MG: 500 TABLET, FILM COATED ORAL at 17:54

## 2020-12-04 RX ADMIN — PIPERACILLIN AND TAZOBACTAM SCH MLS/HR: 3; .375 INJECTION, POWDER, LYOPHILIZED, FOR SOLUTION INTRAVENOUS; PARENTERAL at 13:00

## 2020-12-04 RX ADMIN — INSULIN LISPRO SCH UNIT: 100 INJECTION, SOLUTION INTRAVENOUS; SUBCUTANEOUS at 08:42

## 2020-12-04 RX ADMIN — PIPERACILLIN AND TAZOBACTAM SCH MLS/HR: 3; .375 INJECTION, POWDER, LYOPHILIZED, FOR SOLUTION INTRAVENOUS; PARENTERAL at 00:18

## 2020-12-04 RX ADMIN — VANCOMYCIN HYDROCHLORIDE SCH MLS/HR: 1 INJECTION, POWDER, LYOPHILIZED, FOR SOLUTION INTRAVENOUS at 18:35

## 2020-12-04 RX ADMIN — SODIUM CHLORIDE PRN MLS/HR: 9 INJECTION, SOLUTION INTRAVENOUS at 17:51

## 2020-12-04 RX ADMIN — INSULIN LISPRO SCH UNIT: 100 INJECTION, SOLUTION INTRAVENOUS; SUBCUTANEOUS at 12:47

## 2020-12-04 RX ADMIN — PIPERACILLIN AND TAZOBACTAM SCH MLS/HR: 3; .375 INJECTION, POWDER, LYOPHILIZED, FOR SOLUTION INTRAVENOUS; PARENTERAL at 17:53

## 2020-12-04 NOTE — PDOC CONSULTATION
Consultation


Consult Date: 12/04/20


Provider Consulted: CESARIO JACKSON


Consult reason:: Osteomyelitis of the remaining left fifth metatarsal bone





History of Present Illness


Admission Date/PCP: 


  12/03/20 17:24





  ERIKA CHILD





History of Present Illness: 


CHEPE SCHMITT is a 35 year old female diabetic who had amputation of the left 

fifth toe together with metatarsal head last 9/25/2020 by .  Patient 

wound was left open and was followed at the wound care center.  He did have his 

wound VAC placed at one time.  However for the past few days her left foot 

started to get more swollen and more painful on the lateral aspect of the left 

foot.  X-rays done in ED showed osteomyelitis of the remaining left fifth 

metatarsal bone.  Patient complaining of fever and chills about 4 days ago just 

prior to admission.








Past Medical History


Cardiac Medical History: Reports: Hypertension


   Denies: Coronary Artery Disease, Myocardial Infarction


Pulmonary Medical History: 


   Denies: Asthma, Bronchitis, Chronic Obstructive Pulmonary Disease (COPD), 

Pneumonia


Neurological Medical History: 


   Denies: Seizures


Endocrine Medical History: Reports: Diabetes Mellitus Type 2


Musculoskeltal Medical History: 


   Denies: Arthritis


Psychiatric Medical History: 


   Denies: Depression


Hematology: 


   Denies: Anemia





Past Surgical History


Past Surgical History: Reports: Amputation - Ray amputation left fifth toe





Social History


Smoking Status: Never Smoker


Electronic Cigarette use?: No


Frequency of Alcohol Use: None


Hx Recreational Drug Use: No


Drugs: None


Hx Prescription Drug Abuse: No





Family History


Family History: Reviewed & Not Pertinent, Hypertension


Parental Family History Reviewed: Yes - Positive diabetes mellitus


Children Family History Reviewed: No


Sibling(s) Family History Reviewed.: No





Medication/Allergy


Home Medications: 








Metformin HCl 1,000 mg PO BID #60 tablet 03/07/19 


Amlodipine Besylate [Norvasc 10 mg Tablet] 10 mg PO DAILY 09/23/20 


Ferrous Sulfate [Feosol 325 mg Tablet] 325 mg PO DAILY 09/23/20 


Hydrochlorothiazide [Hydrodiuril 12.5 mg Tablet] 12.5 mg PO DAILY 09/23/20 


Insulin NPH Human Isophane [Novolin N Flexpen] 40 units SUBCUT QPM 09/23/20 


Insulin NPH Human Isophane [Novolin N Flexpen] 50 units SUBCUT QAM 09/23/20 


Insulin Regular, Human [Novolin R] 0 units SUBCUT .PERSLIDINGSCALE 09/23/20 


Irbesartan 300 mg PO DAILY 09/23/20 


Lactobacillus Acidophilus/Fos [Acidophilus Probiotic Tablet] 1 tab PO BID 

09/23/20 


Tramadol HCl [Ultram 50 mg Tablet] 50 mg PO Q6HP PRN #60 tablet 09/30/20 








Allergies/Adverse Reactions: 


                                        





No Known Allergies Allergy (Verified 12/03/20 14:40)


   











Review of Systems


Constitutional: PRESENT: as per HPI


Musculoskeletal: PRESENT: other - Pain to left foot lateral aspect





Physical Exam


Vital Signs: 


                                        











Temp Pulse Resp BP Pulse Ox


 


 98.3 F   94   12   133/67 H  100 


 


 12/04/20 16:07  12/04/20 16:07  12/04/20 16:07  12/04/20 16:07  12/04/20 16:07








                                 Intake & Output











 12/03/20 12/04/20 12/05/20





 06:59 06:59 06:59


 


Intake Total  2170 250


 


Balance  2170 250


 


Weight  152.8 kg 











General appearance: PRESENT: mild distress


Head exam: PRESENT: atraumatic


Eye exam: PRESENT: conjunctiva pink


Mouth exam: PRESENT: moist


Neck exam: PRESENT: full ROM


Respiratory exam: PRESENT: clear to auscultation kim


Cardiovascular exam: PRESENT: RRR


Pulses: PRESENT: normal radial pulses, normal dorsalis pedis pul


Vascular exam: PRESENT: normal capillary refill


GI/Abdominal exam: PRESENT: soft


Rectal exam: PRESENT: deferred


Musculoskeletal exam: PRESENT: other - Left foot swollen with tenderness and 

proximal lateral foot close to the heel Partially opened left fifth toe 

amputation site with small amount of serosanguineous drainage


Neurological exam: PRESENT: alert, oriented to person, oriented to place, 

oriented to time, oriented to situation


Psychiatric exam: PRESENT: appropriate affect


Skin exam: PRESENT: normal color, warm





Results


Laboratory Results: 


                                        





                                 12/04/20 06:12 





                                 12/04/20 06:12 





                                        











  12/04/20 12/04/20





  06:12 06:12


 


WBC  10.6 H 


 


RBC  3.48 L 


 


Hgb  8.1 L 


 


Hct  24.1 L 


 


MCV  69 L 


 


MCH  23.2 L 


 


MCHC  33.4 


 


RDW  17.4 H 


 


Plt Count  374 


 


Seg Neutrophils %  73.3 


 


Sodium   135.1 L


 


Potassium   4.2


 


Chloride   102


 


Carbon Dioxide   29


 


Anion Gap   4 L


 


BUN   7


 


Creatinine   0.74


 


Est GFR (African Amer)   > 60


 


Glucose   204 H


 


Calcium   8.8


 


Total Bilirubin   0.6


 


AST   11 L


 


Alkaline Phosphatase   73


 


Total Protein   7.0


 


Albumin   2.9 L


 


Triglycerides   122


 


Cholesterol   108.73


 


LDL Cholesterol Direct   55


 


VLDL Cholesterol   24.0


 


HDL Cholesterol   22 L











Impressions: 


                                        





Foot X-Ray  12/03/20 14:49


IMPRESSION:  Osteomyelitis in the 5th metatarsal.


 














Assessment & Plan





- Diagnosis


(1) Type 2 diabetes mellitus


Is this a current diagnosis for this admission?: Yes   





(2) Osteomyelitis left fifth metatarsal bone


Is this a current diagnosis for this admission?: Yes   





- Time


Time Spent: 30 to 50 Minutes





- Inpatient Certification


Medical Necessity: Need for IV Antibiotics, Need for Surgery





- Plan Summary


Plan Summary: 





35-year-old female type 2 diabetes with previous amputation of the left fifth 

toe and metatarsal head 9/25/2020.  The amputation wound was left open and being

followed at the wound care center and at one time the wound VAC placed.  However

for the past several days patient complaining of more pain and swelling of the 

left foot associated with fever and chills.  X-ray of the left foot showed 

osteomyelitis of the remaining left fifth metatarsal bone.  Is also tender 

around the proximal part of the metatarsal bone towards the ankle area.  There 

is a palpable dorsalis pedis artery pulse. There is some serosanguineous 

drainage on his squeezing around the fifth metatarsal bone area.  Her white 

count slightly elevated and her blood sugar still elevated.


She will need at least a completion amputation of the left fifth metatarsal 

bone.  I discussed the procedure with the patient and told her that she may need

higher up amputation such as below knee if the infection is not well controlled 

with IV antibiotics and the amputation of the fifth metatarsal.  She appears to 

understand this since apparently her grandmother also had diabetes and had 

amputation of the leg.





Plans:


Schedule completion of amputation of the left fifth metatarsal bone for tomorrow

by Dr. Armijo.

## 2020-12-04 NOTE — PDOC PROGRESS REPORT
Subjective


Date:: 12/04/20


Subjective:: 





Patient denied any chest pain or difficulty with breathing. No reported signific

ant pain in left foot. No fever or chills. Patient was seen in consultation by 

Dr. Rees, surgeon, and plan for further surgical intervention tomorrow. No 

nausea, vomiting, or abdominal pain.


Reason For Visit: 


CELLULITIS OF FOOT,TYPE 2 DIABETES MELLITUS WITH








Physical Exam


Vital Signs: 


                                        











Temp Pulse Resp BP Pulse Ox


 


 98.1 F   97   17   132/63 H  99 


 


 12/04/20 06:26  12/04/20 02:00  12/03/20 23:26  12/03/20 23:26  12/03/20 23:26








                                 Intake & Output











 12/03/20 12/04/20 12/05/20





 06:59 06:59 06:59


 


Intake Total  2170 


 


Balance  2170 


 


Weight  152.8 kg 











General appearance: PRESENT: no acute distress, morbidly obese


Head exam: PRESENT: atraumatic, normocephalic


Eye exam: PRESENT: conjunctiva pink.  ABSENT: scleral icterus


Ear exam: PRESENT: normal external ear exam


Mouth exam: PRESENT: moist, tongue midline


Neck exam: PRESENT: full ROM.  ABSENT: carotid bruit, JVD, lymphadenopathy, 

thyromegaly


Respiratory exam: PRESENT: clear to auscultation kim


Cardiovascular exam: PRESENT: RRR, +S1, +S2.  ABSENT: diastolic murmur, rubs, 

systolic murmur


Pulses: PRESENT: normal dorsalis pedis pul


Vascular exam: PRESENT: normal capillary refill.  ABSENT: pallor


GI/Abdominal exam: PRESENT: normal bowel sounds, soft.  ABSENT: distended, 

guarding, mass, organolmegaly, rebound, tenderness


Rectal exam: PRESENT: deferred


Extremities exam: PRESENT: pedal edema - limiuted to left foot, tenderness


Musculoskeletal exam: PRESENT: deformity - left foot s/p 5th toe amputation, 

tenderness - left foot to palpation and manipulation on examination, No sign

ificant wound drainage.


Neurological exam: PRESENT: alert, awake, oriented to person, oriented to place,

oriented to time, oriented to situation, CN II-XII grossly intact.  ABSENT: 

motor sensory deficit


Psychiatric exam: PRESENT: appropriate affect, normal mood.  ABSENT: homicidal 

ideation, suicidal ideation


Skin exam: PRESENT: dry, intact, warm.  ABSENT: cyanosis, rash





Results


Laboratory Results: 


                                        





                                 12/04/20 06:12 





                                 12/04/20 06:12 





                                        











  12/03/20 12/03/20 12/03/20





  15:10 15:10 15:10


 


WBC  11.9 H  


 


RBC  3.95  


 


Hgb  9.2 L  


 


Hct  27.5 L  


 


MCV  70 L  


 


MCH  23.2 L  


 


MCHC  33.4  


 


RDW  17.4 H  


 


Plt Count  471 H  


 


Seg Neutrophils %  73.9  


 


Sodium   134.3 L 


 


Potassium   4.2 


 


Chloride   98 


 


Carbon Dioxide   27 


 


Anion Gap   9 


 


BUN   7 


 


Creatinine   0.70 


 


Est GFR ( Amer)   > 60 


 


Glucose   220 H 


 


Lactic Acid    2.3 H


 


Calcium   9.4 


 


Total Bilirubin   0.7 


 


AST   13 L 


 


Alkaline Phosphatase   86 


 


C-Reactive Protein   


 


Total Protein   8.4 H 


 


Albumin   3.7 


 


Triglycerides   


 


Cholesterol   


 


LDL Cholesterol Direct   


 


VLDL Cholesterol   


 


HDL Cholesterol   














  12/03/20 12/04/20 12/04/20





  15:10 06:12 06:12


 


WBC   10.6 H 


 


RBC   3.48 L 


 


Hgb   8.1 L 


 


Hct   24.1 L 


 


MCV   69 L 


 


MCH   23.2 L 


 


MCHC   33.4 


 


RDW   17.4 H 


 


Plt Count   374 


 


Seg Neutrophils %   73.3 


 


Sodium    135.1 L


 


Potassium    4.2


 


Chloride    102


 


Carbon Dioxide    29


 


Anion Gap    4 L


 


BUN    7


 


Creatinine    0.74


 


Est GFR ( Amer)    > 60


 


Glucose    204 H


 


Lactic Acid   


 


Calcium    8.8


 


Total Bilirubin    0.6


 


AST    11 L


 


Alkaline Phosphatase    73


 


C-Reactive Protein  252.6 H  


 


Total Protein    7.0


 


Albumin    2.9 L


 


Triglycerides    122


 


Cholesterol    108.73


 


LDL Cholesterol Direct    55


 


VLDL Cholesterol    24.0


 


HDL Cholesterol    22 L











Impressions: 


                                        





Foot X-Ray  12/03/20 14:49


IMPRESSION:  Osteomyelitis in the 5th metatarsal.


 














Assessment & Plan





- Diagnosis


(1) Osteomyelitis of foot, left, acute


Is this a current diagnosis for this admission?: Yes   





(2) Probable sepsis


Is this a current diagnosis for this admission?: Yes   





(3) Uncontrolled type 2 diabetes mellitus


Qualifiers: 


   Glycemic state: with hyperglycemia   Qualified Code(s): E11.65 - Type 2 

diabetes mellitus with hyperglycemia   


Is this a current diagnosis for this admission?: Yes   





(4) HTN (hypertension)


Qualifiers: 


   Hypertension type: essential hypertension   Qualified Code(s): I10 - Ess

ential (primary) hypertension   


Is this a current diagnosis for this admission?: Yes   





(5) Morbid (severe) obesity due to excess calories


Is this a current diagnosis for this admission?: Yes   





- Time


Time Spent with patient: 25-34 minutes


Level of Care: TELE


Medications reviewed and adjusted accordingly: Yes


Anticipated discharge: Home with Homehealth


Anticipated DC Timeframe: within 72 hours





- Inpatient Certification


Based on my medical assessment, after consideration of the patient's 

comorbidities, presenting symptoms, or acuity I expect that the services needed 

warrant INPATIENT care.: Yes


I certify that my determination is in accordance with my understanding of 

Medicare's requirements for reasonable and necessary INPATIENT services [42 CFR 

412.3e].: Yes


Medical Necessity: Significant Comorbidiites Make Outpatient Treatment Too 

Risky, Need Close Monitoring Due to Risk of Patient Decompensation, Need For IV 

Fluids, Need For Continuous Telemetry Monitoring, Need for Pain Control, Need 

for IV Antibiotics, Need for Surgery, Risk of Complication if Not Cared For in 

Hospital, Risk of Diagnosis Which Will Require Inpatient Eval/Care/Monitoring


Post Hospital Care: D/C Planner Documentation





- Plan Summary


Plan Summary: 





Continue antibiotic coverage. Follow up on wound and blood culture findings. 

Maintain NPO after midnight as per surgical team plan for intervention tomorrow.

## 2020-12-05 LAB — VANCOMYCIN,TROUGH: 8.2 UG/ML (ref 5–20)

## 2020-12-05 PROCEDURE — 0Y6N0ZF DETACHMENT AT LEFT FOOT, PARTIAL 5TH RAY, OPEN APPROACH: ICD-10-PCS | Performed by: SURGERY

## 2020-12-05 RX ADMIN — METFORMIN HYDROCHLORIDE SCH MG: 500 TABLET, FILM COATED ORAL at 11:27

## 2020-12-05 RX ADMIN — INSULIN HUMAN SCH UNIT: 100 INJECTION, SUSPENSION SUBCUTANEOUS at 17:40

## 2020-12-05 RX ADMIN — PIPERACILLIN AND TAZOBACTAM SCH: 3; .375 INJECTION, POWDER, LYOPHILIZED, FOR SOLUTION INTRAVENOUS; PARENTERAL at 15:39

## 2020-12-05 RX ADMIN — METFORMIN HYDROCHLORIDE SCH MG: 500 TABLET, FILM COATED ORAL at 17:39

## 2020-12-05 RX ADMIN — OXYCODONE AND ACETAMINOPHEN PRN TAB: 5; 325 TABLET ORAL at 18:49

## 2020-12-05 RX ADMIN — INSULIN LISPRO SCH: 100 INJECTION, SOLUTION INTRAVENOUS; SUBCUTANEOUS at 13:29

## 2020-12-05 RX ADMIN — AMLODIPINE BESYLATE SCH MG: 10 TABLET ORAL at 11:27

## 2020-12-05 RX ADMIN — VANCOMYCIN HYDROCHLORIDE SCH MLS/HR: 1 INJECTION, POWDER, LYOPHILIZED, FOR SOLUTION INTRAVENOUS at 01:15

## 2020-12-05 RX ADMIN — PIPERACILLIN AND TAZOBACTAM SCH MLS/HR: 3; .375 INJECTION, POWDER, LYOPHILIZED, FOR SOLUTION INTRAVENOUS; PARENTERAL at 01:12

## 2020-12-05 RX ADMIN — LOSARTAN POTASSIUM SCH MG: 50 TABLET, FILM COATED ORAL at 11:28

## 2020-12-05 RX ADMIN — INSULIN HUMAN SCH UNIT: 100 INJECTION, SUSPENSION SUBCUTANEOUS at 11:28

## 2020-12-05 RX ADMIN — FERROUS SULFATE TAB 325 MG (65 MG ELEMENTAL FE) SCH MG: 325 (65 FE) TAB at 11:28

## 2020-12-05 RX ADMIN — INSULIN LISPRO SCH: 100 INJECTION, SOLUTION INTRAVENOUS; SUBCUTANEOUS at 07:57

## 2020-12-05 RX ADMIN — VANCOMYCIN HYDROCHLORIDE SCH MLS/HR: 1 INJECTION, POWDER, LYOPHILIZED, FOR SOLUTION INTRAVENOUS at 21:27

## 2020-12-05 RX ADMIN — PIPERACILLIN AND TAZOBACTAM SCH MLS/HR: 3; .375 INJECTION, POWDER, LYOPHILIZED, FOR SOLUTION INTRAVENOUS; PARENTERAL at 06:07

## 2020-12-05 RX ADMIN — INSULIN LISPRO SCH UNIT: 100 INJECTION, SOLUTION INTRAVENOUS; SUBCUTANEOUS at 17:39

## 2020-12-05 RX ADMIN — ENOXAPARIN SODIUM SCH: 40 INJECTION SUBCUTANEOUS at 11:31

## 2020-12-05 RX ADMIN — INSULIN LISPRO SCH UNIT: 100 INJECTION, SOLUTION INTRAVENOUS; SUBCUTANEOUS at 23:28

## 2020-12-05 RX ADMIN — PANTOPRAZOLE SODIUM SCH: 40 TABLET, DELAYED RELEASE ORAL at 06:06

## 2020-12-05 RX ADMIN — PIPERACILLIN AND TAZOBACTAM SCH MLS/HR: 3; .375 INJECTION, POWDER, LYOPHILIZED, FOR SOLUTION INTRAVENOUS; PARENTERAL at 18:48

## 2020-12-05 RX ADMIN — VANCOMYCIN HYDROCHLORIDE SCH MLS/HR: 1 INJECTION, POWDER, LYOPHILIZED, FOR SOLUTION INTRAVENOUS at 11:29

## 2020-12-05 NOTE — CDI QUERY
CDI Query


CDI Review: 





Documentation in the Medical Record indicates this patient has:





Height:  5 ft 11 in


Weight:  152.8 kg (336.16 lbs)


Calculated BMI:  46.9 kg/m2





The following is also documented in the Medical Record (if pertinent to 

diagnoses)





Per Progress Notes:  "Morbid Obesity due to excess calories"





Based on your medical judgement, can you further clarify in the Progress Notes 

the diagnosis associated with these findings:





   Morbid Obesity / 46.9 kg/m2


   Overweight / 46.9 kg/m2


   Obesity / 46.9 kg/m2


   Other condition (please specify)


   None of the above / Not applicable





Please note:


Obesity is defined as:


Class 1:  BMI of 30 to < 35


Class 2:  BMI of 35 to < 40


Class 3:  BMI of > 40 (this is also defined as Morbid Obesity)


Overweight:  BMI 25 to < 30


Normal weight:  BMI 18.5 to < 25


The condition of Morbid Obesity is a significant contributing factor to the 

health and recovery of a patient.  Documentation of the BMI is clinical evidence

for the diagnosis.








Thank you for your consideration.





PHYLLIS Delgado RN


Clinical 


Physician Advisor


[phone]


[email or web address]

## 2020-12-05 NOTE — PDOC PROGRESS REPORT
Subjective


Date:: 12/05/20


Subjective:: 





Patient was admitted for the left foot osteomyelitis currently going for the [facility]


No other events happens


Reason For Visit: 


CELLULITIS OF FOOT,TYPE 2 DIABETES MELLITUS WITH








Physical Exam


Vital Signs: 


                                        











Temp Pulse Resp BP Pulse Ox


 


 98.4 F   95   18   126/68 H  98 


 


 12/05/20 07:42  12/05/20 07:00  12/05/20 04:13  12/05/20 04:13  12/05/20 04:13








                                 Intake & Output











 12/04/20 12/05/20 12/06/20





 06:59 06:59 06:59


 


Intake Total 2170 2708 


 


Balance 2170 2708 


 


Weight 152.8 kg 152 kg 











General appearance: PRESENT: no acute distress, well-developed, well-nourished


Head exam: PRESENT: atraumatic, normocephalic


Eye exam: PRESENT: conjunctiva pink, EOMI, PERRLA.  ABSENT: scleral icterus


Ear exam: PRESENT: normal external ear exam


Mouth exam: PRESENT: moist, tongue midline


Neck exam: PRESENT: full ROM.  ABSENT: carotid bruit, JVD, lymphadenopathy, 

thyromegaly


Respiratory exam: PRESENT: clear to auscultation kim


Cardiovascular exam: PRESENT: RRR.  ABSENT: diastolic murmur, rubs, systolic 

murmur


Vascular exam: PRESENT: normal capillary refill


GI/Abdominal exam: PRESENT: normal bowel sounds, soft.  ABSENT: distended, g

uarding, mass, organolmegaly, rebound, tenderness


Rectal exam: PRESENT: deferred


Neurological exam: PRESENT: alert, awake, oriented to person, oriented to place,

oriented to time, oriented to situation, CN II-XII grossly intact.  ABSENT: 

motor sensory deficit


Psychiatric exam: PRESENT: appropriate affect, normal mood.  ABSENT: homicidal 

ideation, suicidal ideation


Skin exam: PRESENT: dry, intact, warm.  ABSENT: cyanosis, rash





Results


Laboratory Results: 


                                        





                                 12/04/20 06:12 





                                 12/04/20 06:12 








Impressions: 


                                        





Foot X-Ray  12/03/20 14:49


IMPRESSION:  Osteomyelitis in the 5th metatarsal.


 














Assessment & Plan





- Diagnosis


(1) Osteomyelitis of foot, left, acute


Is this a current diagnosis for this admission?: Yes   





(2) Type 2 diabetes mellitus with foot ulcer


Qualifiers: 


   Diabetes mellitus long term insulin use: unspecified long term insulin use 

status   Qualified Code(s): E11.621 - Type 2 diabetes mellitus with foot ulcer; 

L97.509 - Non-pressure chronic ulcer of other part of unspecified foot with 

unspecified severity   


Is this a current diagnosis for this admission?: Yes   





- Time


Time Spent with patient: 15-24 minutes


Level of Care: TELE


Medications reviewed and adjusted accordingly: Yes


Anticipated discharge: Home


Anticipated DC Timeframe: Other





- Plan Summary


Plan Summary: 





Continues the IV antibiotic continues sliding scale

## 2020-12-05 NOTE — OPERATIVE REPORT
Operative Report


DATE OF SURGERY: 12/05/20


PREOPERATIVE DIAGNOSIS: Osteomyelitis of the left remaining fifth metatarsal samuel

ne


POSTOPERATIVE DIAGNOSIS: Same


OPERATION: Debridement and amputation remaining left fifth metatarsal bone


SURGEON: CESARIO JACKSON


ANESTHESIA: GA


TISSUE REMOVED OR ALTERED: Fifth metatarsal bone


COMPLICATIONS: 





None


ESTIMATED BLOOD LOSS: 150 cc


QUANTITATIVE BLOOD LOSS: 150


INTRAOPERATIVE FINDINGS: Evidence of fracture of the proximal metatarsal bone 

with osteomyelitis.  Small amount of abscess noted around the bone


PROCEDURE: 





After adequate general anesthesia patient was placed in supine position with the

left leg slightly elevated on support at the area of the calf.  The left foot 

and ankle and lower leg were then prepped and draped in the usual sterile 

fashion.  After appropriate timeout was then called.  Next a the previous 

incision for the ray amputation of the fifth toe was then extended proximally 

towards the area of the ankle.  This was deepened down into the bone.  The bone 

was palpated and noted to be fractured the proximal side.  Pieces of the bone 

were then rongeured.  Small amount of abscess from this area that was sent for 

CNS together were a few of the bones for.  The bone was practically completely 

removed and some necrotic tissue surrounded also removed with the use of knife 

and rongeur.  The area was then curetted.  The edges of the previous incision 

was then trimmed.  The cavity was then irrigated with saline solution.  There 

was some oozing noted and this was then controlled with packing using IV 

uniforms soaked Kerlix.  Present covered with sterile 4 x 4 ABD and Kerlix and 

wrapped further with Ace bandage.  Needle instruments and sponge counts were all

correct.  Patient tolerated procedure well brought to recovery room extubated in

satisfactory condition.

## 2020-12-06 LAB
ADD MANUAL DIFF: NO
ANION GAP SERPL CALC-SCNC: 8 MMOL/L (ref 5–19)
BASOPHILS # BLD AUTO: 0.1 10^3/UL (ref 0–0.2)
BASOPHILS NFR BLD AUTO: 0.7 % (ref 0–2)
BUN SERPL-MCNC: 7 MG/DL (ref 7–20)
CALCIUM: 8.7 MG/DL (ref 8.4–10.2)
CHLORIDE SERPL-SCNC: 103 MMOL/L (ref 98–107)
CO2 SERPL-SCNC: 26 MMOL/L (ref 22–30)
EOSINOPHIL # BLD AUTO: 0.3 10^3/UL (ref 0–0.6)
EOSINOPHIL NFR BLD AUTO: 2.8 % (ref 0–6)
ERYTHROCYTE [DISTWIDTH] IN BLOOD BY AUTOMATED COUNT: 17.8 % (ref 11.5–14)
GLUCOSE SERPL-MCNC: 119 MG/DL (ref 75–110)
HCT VFR BLD CALC: 22.2 % (ref 36–47)
HGB BLD-MCNC: 7.4 G/DL (ref 12–15.5)
LYMPHOCYTES # BLD AUTO: 1.8 10^3/UL (ref 0.5–4.7)
LYMPHOCYTES NFR BLD AUTO: 17.5 % (ref 13–45)
MCH RBC QN AUTO: 22.9 PG (ref 27–33.4)
MCHC RBC AUTO-ENTMCNC: 33.4 G/DL (ref 32–36)
MCV RBC AUTO: 69 FL (ref 80–97)
MONOCYTES # BLD AUTO: 0.7 10^3/UL (ref 0.1–1.4)
MONOCYTES NFR BLD AUTO: 6.9 % (ref 3–13)
NEUTROPHILS # BLD AUTO: 7.2 10^3/UL (ref 1.7–8.2)
NEUTS SEG NFR BLD AUTO: 72.1 % (ref 42–78)
PLATELET # BLD: 457 10^3/UL (ref 150–450)
POTASSIUM SERPL-SCNC: 3.7 MMOL/L (ref 3.6–5)
RBC # BLD AUTO: 3.23 10^6/UL (ref 3.72–5.28)
TOTAL CELLS COUNTED % (AUTO): 100 %
VANCOMYCIN,TROUGH: 9.1 UG/ML (ref 5–20)
WBC # BLD AUTO: 10 10^3/UL (ref 4–10.5)

## 2020-12-06 PROCEDURE — 30233N1 TRANSFUSION OF NONAUTOLOGOUS RED BLOOD CELLS INTO PERIPHERAL VEIN, PERCUTANEOUS APPROACH: ICD-10-PCS | Performed by: FAMILY MEDICINE

## 2020-12-06 RX ADMIN — OXYCODONE AND ACETAMINOPHEN PRN TAB: 5; 325 TABLET ORAL at 05:10

## 2020-12-06 RX ADMIN — PANTOPRAZOLE SODIUM SCH MG: 40 TABLET, DELAYED RELEASE ORAL at 05:11

## 2020-12-06 RX ADMIN — SODIUM CHLORIDE PRN MLS/HR: 9 INJECTION, SOLUTION INTRAVENOUS at 09:35

## 2020-12-06 RX ADMIN — SODIUM CHLORIDE PRN MLS/HR: 9 INJECTION, SOLUTION INTRAVENOUS at 20:43

## 2020-12-06 RX ADMIN — AMLODIPINE BESYLATE SCH MG: 10 TABLET ORAL at 09:36

## 2020-12-06 RX ADMIN — METFORMIN HYDROCHLORIDE SCH MG: 500 TABLET, FILM COATED ORAL at 09:36

## 2020-12-06 RX ADMIN — OXYCODONE AND ACETAMINOPHEN PRN TAB: 5; 325 TABLET ORAL at 23:27

## 2020-12-06 RX ADMIN — INSULIN HUMAN SCH UNIT: 100 INJECTION, SUSPENSION SUBCUTANEOUS at 18:35

## 2020-12-06 RX ADMIN — VANCOMYCIN HYDROCHLORIDE SCH MLS/HR: 1 INJECTION, POWDER, LYOPHILIZED, FOR SOLUTION INTRAVENOUS at 09:37

## 2020-12-06 RX ADMIN — VANCOMYCIN HYDROCHLORIDE SCH MLS/HR: 1 INJECTION, POWDER, LYOPHILIZED, FOR SOLUTION INTRAVENOUS at 20:43

## 2020-12-06 RX ADMIN — PIPERACILLIN AND TAZOBACTAM SCH MLS/HR: 3; .375 INJECTION, POWDER, LYOPHILIZED, FOR SOLUTION INTRAVENOUS; PARENTERAL at 05:12

## 2020-12-06 RX ADMIN — PIPERACILLIN AND TAZOBACTAM SCH MLS/HR: 3; .375 INJECTION, POWDER, LYOPHILIZED, FOR SOLUTION INTRAVENOUS; PARENTERAL at 22:29

## 2020-12-06 RX ADMIN — LOSARTAN POTASSIUM SCH MG: 50 TABLET, FILM COATED ORAL at 09:36

## 2020-12-06 RX ADMIN — INSULIN LISPRO SCH: 100 INJECTION, SOLUTION INTRAVENOUS; SUBCUTANEOUS at 09:33

## 2020-12-06 RX ADMIN — INSULIN LISPRO SCH UNIT: 100 INJECTION, SOLUTION INTRAVENOUS; SUBCUTANEOUS at 18:38

## 2020-12-06 RX ADMIN — OXYCODONE AND ACETAMINOPHEN PRN TAB: 5; 325 TABLET ORAL at 16:15

## 2020-12-06 RX ADMIN — PIPERACILLIN AND TAZOBACTAM SCH MLS/HR: 3; .375 INJECTION, POWDER, LYOPHILIZED, FOR SOLUTION INTRAVENOUS; PARENTERAL at 14:21

## 2020-12-06 RX ADMIN — INSULIN LISPRO SCH UNIT: 100 INJECTION, SOLUTION INTRAVENOUS; SUBCUTANEOUS at 14:21

## 2020-12-06 RX ADMIN — ENOXAPARIN SODIUM SCH MG: 40 INJECTION SUBCUTANEOUS at 09:37

## 2020-12-06 RX ADMIN — INSULIN HUMAN SCH UNIT: 100 INJECTION, SUSPENSION SUBCUTANEOUS at 09:37

## 2020-12-06 RX ADMIN — METFORMIN HYDROCHLORIDE SCH MG: 500 TABLET, FILM COATED ORAL at 18:35

## 2020-12-06 RX ADMIN — INSULIN LISPRO SCH UNIT: 100 INJECTION, SOLUTION INTRAVENOUS; SUBCUTANEOUS at 22:29

## 2020-12-06 RX ADMIN — FERROUS SULFATE TAB 325 MG (65 MG ELEMENTAL FE) SCH MG: 325 (65 FE) TAB at 09:35

## 2020-12-06 RX ADMIN — VANCOMYCIN HYDROCHLORIDE SCH MLS/HR: 1 INJECTION, POWDER, LYOPHILIZED, FOR SOLUTION INTRAVENOUS at 03:33

## 2020-12-06 RX ADMIN — PIPERACILLIN AND TAZOBACTAM SCH MLS/HR: 3; .375 INJECTION, POWDER, LYOPHILIZED, FOR SOLUTION INTRAVENOUS; PARENTERAL at 01:26

## 2020-12-06 NOTE — PDOC PROGRESS REPORT
Subjective


Date:: 12/06/20


Subjective:: 





feels ok


Reason For Visit: 


CELLULITIS OF FOOT,TYPE 2 DIABETES MELLITUS WITH








Physical Exam


Vital Signs: 


                                        











Temp Pulse Resp BP Pulse Ox


 


 97.9 F   89   18   101/59 L  99 


 


 12/05/20 23:59  12/05/20 23:59  12/05/20 23:59  12/05/20 23:59  12/05/20 23:59








                                 Intake & Output











 12/04/20 12/05/20 12/06/20





 06:59 06:59 06:59


 


Intake Total 2170 2708 3165


 


Output Total   100


 


Balance 2170 2708 3065


 


Weight 152.8 kg 152 kg 152 kg











General appearance: PRESENT: no acute distress


Head exam: PRESENT: normocephalic


Eye exam: PRESENT: EOMI


Ear exam: PRESENT: normal external ear exam


Mouth exam: PRESENT: moist


Teeth exam: PRESENT: poor dentation


Neck exam: PRESENT: full ROM


Respiratory exam: PRESENT: clear to auscultation kim


Cardiovascular exam: PRESENT: RRR


Pulses: PRESENT: normal radial pulses, normal femoral pulses


Vascular exam: PRESENT: normal capillary refill


Breast: PRESENT: Normal


GI/Abdominal exam: PRESENT: soft


Rectal exam: PRESENT: deferred


Extremities exam: PRESENT: other - left lateral foot incision clean surgical 

dressing removed wound base clean


Musculoskeletal exam: PRESENT: full ROM


Neurological exam: PRESENT: alert, awake, oriented to person, oriented to place


Psychiatric exam: PRESENT: appropriate affect


Skin exam: PRESENT: dry





Results


Laboratory Results: 


                                        





                                 12/04/20 06:12 





                                 12/04/20 06:12 








Impressions: 


                                        





Foot X-Ray  12/03/20 14:49


IMPRESSION:  Osteomyelitis in the 5th metatarsal.


 














Assessment & Plan





- Time


Anticipated Discharge Disposition: Home, Self Care


Anticipated Discharge Timeframe: unk





- Plan Summary


Plan Summary: 





s/p debridement of left diabetic foot infection


pt doing ok


wound clean


will cont iwth wet to dry dressing changes

## 2020-12-06 NOTE — PDOC PROGRESS REPORT
Subjective


Date:: 12/06/20


Subjective:: 





Patient is currently doing well status post surgery for the foot ulcers


Patient hemoglobin is 7.4


No chest pain no short of breath





Reason For Visit: 


CELLULITIS OF FOOT,TYPE 2 DIABETES MELLITUS WITH








Physical Exam


Vital Signs: 


                                        











Temp Pulse Resp BP Pulse Ox


 


 97.7 F   87   19   126/84 H  99 


 


 12/06/20 07:39  12/06/20 07:39  12/06/20 07:39  12/06/20 07:39  12/06/20 07:39








                                 Intake & Output











 12/05/20 12/06/20 12/07/20





 06:59 06:59 06:59


 


Intake Total 2708 3625 


 


Output Total  100 


 


Balance 2708 3525 


 


Weight 152 kg 136.8 kg 











General appearance: PRESENT: no acute distress


Eye exam: PRESENT: PERRLA


Mouth exam: PRESENT: neck supple


Respiratory exam: PRESENT: clear to auscultation kim


Cardiovascular exam: PRESENT: +S1, +S2


GI/Abdominal exam: PRESENT: normal bowel sounds, soft


Additional comments: 





Dressing is intact


Neurological exam: PRESENT: alert, awake, oriented to person, oriented to place,

oriented to time





Results


Laboratory Results: 


                                        





                                 12/06/20 05:20 





                                 12/06/20 05:20 





                                        











  12/03/20 12/06/20 12/06/20





  15:10 05:20 05:20


 


WBC  11.9 H  10.0 


 


RBC  3.95  3.23 L 


 


Hgb  9.2 L  7.4 L 


 


Hct  27.5 L  22.2 L 


 


MCV  70 L  69 L 


 


MCH  23.2 L  22.9 L 


 


MCHC  33.4  33.4 


 


RDW  17.4 H  17.8 H 


 


Plt Count  471 H  457 H 


 


Seg Neutrophils %  73.9  72.1 


 


Sodium    136.8 L


 


Potassium    3.7


 


Chloride    103


 


Carbon Dioxide    26


 


Anion Gap    8


 


BUN    7


 


Creatinine    0.69


 


Est GFR ( Amer)    > 60


 


Glucose    119 H


 


Calcium    8.7








                                        





12/04/20 00:20   Foot - Left   Gram Stain - Final


12/04/20 00:20   Foot - Left   Wound Culture - Final


                            Group B Beta Streptococcus


                            Skin Melody


12/03/20 15:10   Blood   Blood Culture (PCR) - Final








Impressions: 


                                        





Foot X-Ray  12/03/20 14:49


IMPRESSION:  Osteomyelitis in the 5th metatarsal.


 














Assessment & Plan





- Diagnosis


(1) Osteomyelitis of foot, left, acute


Is this a current diagnosis for this admission?: Yes   


Plan: 


Continues to IV antibiotic








(2) Type 2 diabetes mellitus with foot ulcer


Qualifiers: 


   Diabetes mellitus long term insulin use: unspecified long term insulin use 

status   Qualified Code(s): E11.621 - Type 2 diabetes mellitus with foot ulcer; 

L97.509 - Non-pressure chronic ulcer of other part of unspecified foot with 

unspecified severity   


Is this a current diagnosis for this admission?: Yes   


Plan: 


Continues to current medications








(3) Anemia


Qualifiers: 


   Anemia type: unspecified type   Qualified Code(s): D64.9 - Anemia, 

unspecified   


Is this a current diagnosis for this admission?: Yes   


Plan: 


The recent surgery will transfuse 1 unit of blood








(4) HTN (hypertension)


Qualifiers: 


   Hypertension type: essential hypertension   Qualified Code(s): I10 - 

Essential (primary) hypertension   


Is this a current diagnosis for this admission?: Yes   





- Time


Time Spent with patient: 15-24 minutes


Level of Care: IMCU


Medications reviewed and adjusted accordingly: Yes


Anticipated discharge: Home


Anticipated DC Timeframe: Other





- Plan Summary


Plan Summary: 





Continues IV antibiotics follow the culture and sensitivity

## 2020-12-06 NOTE — PDOC PROGRESS REPORT
Subjective


Date:: 12/06/20


Subjective:: 





Less pains


Reason For Visit: 


CELLULITIS OF FOOT,TYPE 2 DIABETES MELLITUS WITH








Physical Exam


Vital Signs: 


                                        











Temp Pulse Resp BP Pulse Ox


 


 98.0 F   74   16   118/70   100 


 


 12/06/20 18:25  12/06/20 18:25  12/06/20 18:25  12/06/20 18:25  12/06/20 18:25








                                 Intake & Output











 12/05/20 12/06/20 12/07/20





 06:59 06:59 06:59


 


Intake Total 3708 3625 951


 


Output Total  100 


 


Balance 3708 3525 951


 


Weight 152 kg 136.8 kg 











Exam: 





Foot looks less swollen.  Operative site looks clean.  It was repacked with 

wet-to-dry dressings.





Results


Laboratory Results: 


                                        





                                 12/06/20 05:20 





                                 12/06/20 05:20 





                                        











  12/03/20 12/06/20 12/06/20





  15:10 05:20 05:20


 


WBC  11.9 H  10.0 


 


RBC  3.95  3.23 L 


 


Hgb  9.2 L  7.4 L 


 


Hct  27.5 L  22.2 L 


 


MCV  70 L  69 L 


 


MCH  23.2 L  22.9 L 


 


MCHC  33.4  33.4 


 


RDW  17.4 H  17.8 H 


 


Plt Count  471 H  457 H 


 


Seg Neutrophils %  73.9  72.1 


 


Sodium    136.8 L


 


Potassium    3.7


 


Chloride    103


 


Carbon Dioxide    26


 


Anion Gap    8


 


BUN    7


 


Creatinine    0.69


 


Est GFR ( Amer)    > 60


 


Glucose    119 H


 


Calcium    8.7


 


Blood Type   


 


Antibody Screen   














  12/06/20





  09:55


 


WBC 


 


RBC 


 


Hgb 


 


Hct 


 


MCV 


 


MCH 


 


MCHC 


 


RDW 


 


Plt Count 


 


Seg Neutrophils % 


 


Sodium 


 


Potassium 


 


Chloride 


 


Carbon Dioxide 


 


Anion Gap 


 


BUN 


 


Creatinine 


 


Est GFR (African Amer) 


 


Glucose 


 


Calcium 


 


Blood Type  B POSITIVE


 


Antibody Screen  NEGATIVE








                                        





12/04/20 00:20   Foot - Left   Gram Stain - Final


12/04/20 00:20   Foot - Left   Wound Culture - Final


                            Group B Beta Streptococcus


                            Skin Melody


12/03/20 15:10   Blood   Blood Culture (PCR) - Final








Impressions: 


                                        





Foot X-Ray  12/03/20 14:49


IMPRESSION:  Osteomyelitis in the 5th metatarsal.


 














Assessment & Plan





- Diagnosis


(1) Type 2 diabetes mellitus


Is this a current diagnosis for this admission?: Yes   





(2) Osteomyelitis left fifth metatarsal bone


Is this a current diagnosis for this admission?: Yes   





- Time


Anticipated Discharge Disposition: Home with Home Health


Anticipated Discharge Timeframe: within 72 hours





- Inpatient Certification


Medical Necessity: Need for IV Antibiotics, Risk of Complication if Not Cared 

For in Hospital





- Plan Summary


Plan Summary: 





35-year-old female diabetic postop day #2 for removal of left fifth metatarsal 

with osteomyelitis.  The wound looks clean.  There is also less swelling of the 

foot.


Plans:


Continue with wet-to-dry dressings every 12 hours.


We will reevaluate in about 24 hours to see if wound healing can be helped by a 

wound VAC


Continue with the IV antibiotics.

## 2020-12-07 LAB
ANION GAP SERPL CALC-SCNC: 10 MMOL/L (ref 5–19)
BUN SERPL-MCNC: 6 MG/DL (ref 7–20)
CALCIUM: 8.9 MG/DL (ref 8.4–10.2)
CHLORIDE SERPL-SCNC: 103 MMOL/L (ref 98–107)
CO2 SERPL-SCNC: 25 MMOL/L (ref 22–30)
GLUCOSE SERPL-MCNC: 101 MG/DL (ref 75–110)
POTASSIUM SERPL-SCNC: 3.7 MMOL/L (ref 3.6–5)

## 2020-12-07 RX ADMIN — INSULIN LISPRO SCH UNIT: 100 INJECTION, SOLUTION INTRAVENOUS; SUBCUTANEOUS at 23:10

## 2020-12-07 RX ADMIN — PIPERACILLIN AND TAZOBACTAM SCH: 3; .375 INJECTION, POWDER, LYOPHILIZED, FOR SOLUTION INTRAVENOUS; PARENTERAL at 00:50

## 2020-12-07 RX ADMIN — PIPERACILLIN AND TAZOBACTAM SCH MLS/HR: 3; .375 INJECTION, POWDER, LYOPHILIZED, FOR SOLUTION INTRAVENOUS; PARENTERAL at 23:10

## 2020-12-07 RX ADMIN — INSULIN LISPRO SCH UNIT: 100 INJECTION, SOLUTION INTRAVENOUS; SUBCUTANEOUS at 12:02

## 2020-12-07 RX ADMIN — INSULIN LISPRO SCH: 100 INJECTION, SOLUTION INTRAVENOUS; SUBCUTANEOUS at 07:45

## 2020-12-07 RX ADMIN — SODIUM CHLORIDE PRN MLS/HR: 9 INJECTION, SOLUTION INTRAVENOUS at 11:20

## 2020-12-07 RX ADMIN — FERROUS SULFATE TAB 325 MG (65 MG ELEMENTAL FE) SCH MG: 325 (65 FE) TAB at 12:01

## 2020-12-07 RX ADMIN — METFORMIN HYDROCHLORIDE SCH MG: 500 TABLET, FILM COATED ORAL at 17:14

## 2020-12-07 RX ADMIN — PANTOPRAZOLE SODIUM SCH MG: 40 TABLET, DELAYED RELEASE ORAL at 05:29

## 2020-12-07 RX ADMIN — OXYCODONE AND ACETAMINOPHEN PRN TAB: 5; 325 TABLET ORAL at 07:59

## 2020-12-07 RX ADMIN — PIPERACILLIN AND TAZOBACTAM SCH MLS/HR: 3; .375 INJECTION, POWDER, LYOPHILIZED, FOR SOLUTION INTRAVENOUS; PARENTERAL at 12:08

## 2020-12-07 RX ADMIN — OXYCODONE AND ACETAMINOPHEN PRN TAB: 5; 325 TABLET ORAL at 03:48

## 2020-12-07 RX ADMIN — INSULIN LISPRO SCH UNIT: 100 INJECTION, SOLUTION INTRAVENOUS; SUBCUTANEOUS at 17:03

## 2020-12-07 RX ADMIN — OXYCODONE AND ACETAMINOPHEN PRN TAB: 5; 325 TABLET ORAL at 15:09

## 2020-12-07 RX ADMIN — DOCUSATE SODIUM SCH MG: 100 CAPSULE, LIQUID FILLED ORAL at 17:14

## 2020-12-07 RX ADMIN — VANCOMYCIN HYDROCHLORIDE SCH MLS/HR: 1 INJECTION, POWDER, LYOPHILIZED, FOR SOLUTION INTRAVENOUS at 12:02

## 2020-12-07 RX ADMIN — INSULIN HUMAN SCH UNIT: 100 INJECTION, SUSPENSION SUBCUTANEOUS at 17:14

## 2020-12-07 RX ADMIN — PIPERACILLIN AND TAZOBACTAM SCH MLS/HR: 3; .375 INJECTION, POWDER, LYOPHILIZED, FOR SOLUTION INTRAVENOUS; PARENTERAL at 17:03

## 2020-12-07 RX ADMIN — INSULIN HUMAN SCH UNIT: 100 INJECTION, SUSPENSION SUBCUTANEOUS at 08:42

## 2020-12-07 RX ADMIN — DOCUSATE SODIUM SCH MG: 100 CAPSULE, LIQUID FILLED ORAL at 12:00

## 2020-12-07 RX ADMIN — LOSARTAN POTASSIUM SCH MG: 50 TABLET, FILM COATED ORAL at 12:01

## 2020-12-07 RX ADMIN — PIPERACILLIN AND TAZOBACTAM SCH MLS/HR: 3; .375 INJECTION, POWDER, LYOPHILIZED, FOR SOLUTION INTRAVENOUS; PARENTERAL at 05:28

## 2020-12-07 RX ADMIN — AMLODIPINE BESYLATE SCH MG: 10 TABLET ORAL at 12:00

## 2020-12-07 RX ADMIN — ENOXAPARIN SODIUM SCH MG: 40 INJECTION SUBCUTANEOUS at 12:02

## 2020-12-07 RX ADMIN — METFORMIN HYDROCHLORIDE SCH MG: 500 TABLET, FILM COATED ORAL at 12:01

## 2020-12-07 RX ADMIN — VANCOMYCIN HYDROCHLORIDE SCH MLS/HR: 1 INJECTION, POWDER, LYOPHILIZED, FOR SOLUTION INTRAVENOUS at 17:04

## 2020-12-07 RX ADMIN — VANCOMYCIN HYDROCHLORIDE SCH MLS/HR: 1 INJECTION, POWDER, LYOPHILIZED, FOR SOLUTION INTRAVENOUS at 03:49

## 2020-12-07 NOTE — PDOC PROGRESS REPORT
Subjective


Date:: 12/07/20


Reason For Visit: 


CELLULITIS OF FOOT,TYPE 2 DIABETES MELLITUS WITH








Patient is, voiding, taking Percocet as needed pain





Physical Exam


Vital Signs: 


                                        











Temp Pulse Resp BP Pulse Ox


 


 97.7 F   78   16   123/73   100 


 


 12/07/20 00:13  12/07/20 07:00  12/07/20 00:13  12/07/20 00:13  12/07/20 00:13








                                 Intake & Output











 12/06/20 12/07/20 12/08/20





 06:59 06:59 06:59


 


Intake Total 3625 4051 


 


Output Total 100  


 


Balance 3525 4051 


 


Weight 136.8 kg 134.9 kg 











General appearance: PRESENT: other - Anxious


Musculoskeletal exam: PRESENT: other - Left foot dressing removed.  Packing 

removed.  The wound is clean, no foul smell; the foot is moderately edematous.  

There is some early granulation tissue.  Wound repacked, fair amount of anxiety 

during packing





Results


Laboratory Results: 


                                        





                                 12/06/20 05:20 





                                 12/07/20 04:38 





                                        











  12/06/20 12/07/20





  09:55 04:38


 


Sodium   137.6


 


Potassium   3.7


 


Chloride   103


 


Carbon Dioxide   25


 


Anion Gap   10


 


BUN   6 L


 


Creatinine   0.81


 


Est GFR (African Amer)   > 60


 


Glucose   101


 


Calcium   8.9


 


Blood Type  B POSITIVE 


 


Antibody Screen  NEGATIVE 








                                        





12/03/20 15:10   Blood   Blood Culture (PCR) - Final


12/04/20 00:20   Foot - Left   Gram Stain - Final


12/04/20 00:20   Foot - Left   Wound Culture - Final


                            Group B Beta Streptococcus


                            Skin Melody








Impressions: 


                                        





Foot X-Ray  12/03/20 14:49


IMPRESSION:  Osteomyelitis in the 5th metatarsal.


 














Assessment & Plan





- Diagnosis


(1) Osteomyelitis left fifth metatarsal bone


Is this a current diagnosis for this admission?: Yes   


Plan: 


Impression: Patient 48 hours status post left fifth ray amputation, wound packed

open, doing well, clean dry smell.  Growing Streptococcus, on vancomycin and 

Zosyn





Plan:





1.  Continue local wound care, dressing changes; patient may be an appropriate 

candidate for wound VAC therapy on an outpatient basis





2.  We will start stool softener.





3.  PT ordered to get patient up ambulating, with a walker, weightbearing left 

heel only








(2) Anemia


Qualifiers: 


   Anemia type: unspecified type   Qualified Code(s): D64.9 - Anemia, unspe

cified   





(3) Type 2 diabetes mellitus


Is this a current diagnosis for this admission?: Yes   





(4) Morbid (severe) obesity due to excess calories


Is this a current diagnosis for this admission?: Yes   





- Time


Anticipated Discharge Disposition: Home, Self Care


Anticipated Discharge Timeframe: within 48 hours

## 2020-12-07 NOTE — PDOC PROGRESS REPORT
Subjective


Date:: 12/07/20


Subjective:: 





s/p left 5th metatarsal bone complete amputation. No fever or chills. No nausea,

vomiting, or abdominal pain. No chest pain or difficulty with breathing. 

Hyperglycemia persist. Wound culture grew group B beta streptococcus and blood 

culture x 1 bottle Gram positive taye.


Reason For Visit: 


CELLULITIS OF FOOT,TYPE 2 DIABETES MELLITUS WITH








Physical Exam


Vital Signs: 


                                        











Temp Pulse Resp BP Pulse Ox


 


 97.9 F   86   17   133/81 H  100 


 


 12/07/20 16:48  12/07/20 16:48  12/07/20 16:48  12/07/20 16:48  12/07/20 16:48








                                 Intake & Output











 12/06/20 12/07/20 12/08/20





 06:59 06:59 06:59


 


Intake Total 3625 5051 860


 


Output Total 100  


 


Balance 3525 5051 860


 


Weight 136.8 kg 134.9 kg 145.9 kg











General appearance: PRESENT: morbidly obese


Head exam: PRESENT: atraumatic, normocephalic


Eye exam: PRESENT: conjunctiva pink.  ABSENT: scleral icterus


Mouth exam: PRESENT: moist


Respiratory exam: PRESENT: clear to auscultation kim


Cardiovascular exam: PRESENT: RRR, +S1, +S2.  ABSENT: diastolic murmur, rubs, 

systolic murmur


Vascular exam: ABSENT: pallor


GI/Abdominal exam: PRESENT: normal bowel sounds, soft.  ABSENT: tenderness


Extremities exam: PRESENT: pedal edema - left foot and lower leg comparatively 

improving


Musculoskeletal exam: PRESENT: ambulatory - with walker


Neurological exam: PRESENT: alert, awake


Skin exam: PRESENT: dry, warm, other - left foot dressing is satisfactory.





Results


Laboratory Results: 


                                        





                                 12/06/20 05:20 





                                 12/07/20 04:38 





                                        











  12/07/20





  04:38


 


Sodium  137.6


 


Potassium  3.7


 


Chloride  103


 


Carbon Dioxide  25


 


Anion Gap  10


 


BUN  6 L


 


Creatinine  0.81


 


Est GFR (African Amer)  > 60


 


Glucose  101


 


Calcium  8.9








                                        





12/03/20 15:10   Blood   Blood Culture (PCR) - Final








Impressions: 


                                        





Foot X-Ray  12/03/20 14:49


IMPRESSION:  Osteomyelitis in the 5th metatarsal.


 














Assessment & Plan





- Diagnosis


(1) Osteomyelitis of foot, left, acute


Is this a current diagnosis for this admission?: Yes   





(2) Probable sepsis


Is this a current diagnosis for this admission?: Yes   





(3) Uncontrolled type 2 diabetes mellitus


Qualifiers: 


   Glycemic state: with hyperglycemia   Qualified Code(s): E11.65 - Type 2 

diabetes mellitus with hyperglycemia   


Is this a current diagnosis for this admission?: Yes   





(4) HTN (hypertension)


Qualifiers: 


   Hypertension type: essential hypertension   Qualified Code(s): I10 - 

Essential (primary) hypertension   


Is this a current diagnosis for this admission?: Yes   





(5) Morbid (severe) obesity due to excess calories


Is this a current diagnosis for this admission?: Yes   





(6) Morbid obesity with body mass index of 40.0-44.9 in adult


Is this a current diagnosis for this admission?: Yes   


Plan: 


continue current caloric restriction and support therapy








(7) Anemia of chronic disease


Is this a current diagnosis for this admission?: Yes   


Plan: 


Obtain CBC in am.








- Time


Time Spent with patient: 25-34 minutes


Level of Care: TELE


Medications reviewed and adjusted accordingly: Yes


Anticipated discharge: Home with Homehealth


Anticipated DC Timeframe: within 72 hours





- Inpatient Certification


Based on my medical assessment, after consideration of the patient's 

comorbidities, presenting symptoms, or acuity I expect that the services needed 

warrant INPATIENT care.: Yes


I certify that my determination is in accordance with my understanding of 

Medicare's requirements for reasonable and necessary INPATIENT services [42 CFR 

412.3e].: Yes


Medical Necessity: Significant Comorbidiites Make Outpatient Treatment Too 

Risky, Need Close Monitoring Due to Risk of Patient Decompensation, Need For IV 

Fluids, Need For Continuous Telemetry Monitoring, Need for Pain Control, Need 

for IV Antibiotics, Risk of Complication if Not Cared For in Hospital, Risk of 

Diagnosis Which Will Require Inpatient Eval/Care/Monitoring


Post Hospital Care: D/C Planner Documentation





- Plan Summary


Plan Summary: 





Continue current medical management. Obtain CBC in Am with consideration of PRBC

transfusion if hemoglobin continue downward trend.

## 2020-12-08 LAB
ADD MANUAL DIFF: NO
ANION GAP SERPL CALC-SCNC: 8 MMOL/L (ref 5–19)
BASOPHILS # BLD AUTO: 0.1 10^3/UL (ref 0–0.2)
BASOPHILS NFR BLD AUTO: 0.6 % (ref 0–2)
BUN SERPL-MCNC: 7 MG/DL (ref 7–20)
CALCIUM: 8.8 MG/DL (ref 8.4–10.2)
CHLORIDE SERPL-SCNC: 103 MMOL/L (ref 98–107)
CO2 SERPL-SCNC: 28 MMOL/L (ref 22–30)
EOSINOPHIL # BLD AUTO: 0.4 10^3/UL (ref 0–0.6)
EOSINOPHIL NFR BLD AUTO: 4.2 % (ref 0–6)
ERYTHROCYTE [DISTWIDTH] IN BLOOD BY AUTOMATED COUNT: 19 % (ref 11.5–14)
GLUCOSE SERPL-MCNC: 130 MG/DL (ref 75–110)
HCT VFR BLD CALC: 24.7 % (ref 36–47)
HGB BLD-MCNC: 8 G/DL (ref 12–15.5)
LYMPHOCYTES # BLD AUTO: 1.7 10^3/UL (ref 0.5–4.7)
LYMPHOCYTES NFR BLD AUTO: 17.7 % (ref 13–45)
MCH RBC QN AUTO: 23.2 PG (ref 27–33.4)
MCHC RBC AUTO-ENTMCNC: 32.3 G/DL (ref 32–36)
MCV RBC AUTO: 72 FL (ref 80–97)
MONOCYTES # BLD AUTO: 0.7 10^3/UL (ref 0.1–1.4)
MONOCYTES NFR BLD AUTO: 8 % (ref 3–13)
NEUTROPHILS # BLD AUTO: 6.5 10^3/UL (ref 1.7–8.2)
NEUTS SEG NFR BLD AUTO: 69.5 % (ref 42–78)
PLATELET # BLD: 481 10^3/UL (ref 150–450)
POTASSIUM SERPL-SCNC: 3.8 MMOL/L (ref 3.6–5)
RBC # BLD AUTO: 3.45 10^6/UL (ref 3.72–5.28)
TOTAL CELLS COUNTED % (AUTO): 100 %
WBC # BLD AUTO: 9.4 10^3/UL (ref 4–10.5)

## 2020-12-08 PROCEDURE — 02HV33Z INSERTION OF INFUSION DEVICE INTO SUPERIOR VENA CAVA, PERCUTANEOUS APPROACH: ICD-10-PCS | Performed by: RADIOLOGY

## 2020-12-08 PROCEDURE — B518ZZA FLUOROSCOPY OF SUPERIOR VENA CAVA, GUIDANCE: ICD-10-PCS | Performed by: RADIOLOGY

## 2020-12-08 PROCEDURE — B548ZZA ULTRASONOGRAPHY OF SUPERIOR VENA CAVA, GUIDANCE: ICD-10-PCS | Performed by: RADIOLOGY

## 2020-12-08 RX ADMIN — INSULIN LISPRO SCH: 100 INJECTION, SOLUTION INTRAVENOUS; SUBCUTANEOUS at 08:27

## 2020-12-08 RX ADMIN — OXYCODONE AND ACETAMINOPHEN PRN TAB: 5; 325 TABLET ORAL at 05:27

## 2020-12-08 RX ADMIN — VANCOMYCIN HYDROCHLORIDE SCH MLS/HR: 1 INJECTION, POWDER, LYOPHILIZED, FOR SOLUTION INTRAVENOUS at 17:26

## 2020-12-08 RX ADMIN — PIPERACILLIN AND TAZOBACTAM SCH MLS/HR: 3; .375 INJECTION, POWDER, LYOPHILIZED, FOR SOLUTION INTRAVENOUS; PARENTERAL at 05:26

## 2020-12-08 RX ADMIN — INSULIN LISPRO SCH: 100 INJECTION, SOLUTION INTRAVENOUS; SUBCUTANEOUS at 16:19

## 2020-12-08 RX ADMIN — OXYCODONE AND ACETAMINOPHEN PRN TAB: 5; 325 TABLET ORAL at 14:01

## 2020-12-08 RX ADMIN — METFORMIN HYDROCHLORIDE SCH MG: 500 TABLET, FILM COATED ORAL at 17:25

## 2020-12-08 RX ADMIN — PANTOPRAZOLE SODIUM SCH MG: 40 TABLET, DELAYED RELEASE ORAL at 05:26

## 2020-12-08 RX ADMIN — INSULIN HUMAN SCH UNIT: 100 INJECTION, SUSPENSION SUBCUTANEOUS at 09:45

## 2020-12-08 RX ADMIN — VANCOMYCIN HYDROCHLORIDE SCH MLS/HR: 1 INJECTION, POWDER, LYOPHILIZED, FOR SOLUTION INTRAVENOUS at 02:53

## 2020-12-08 RX ADMIN — PIPERACILLIN AND TAZOBACTAM SCH MLS/HR: 3; .375 INJECTION, POWDER, LYOPHILIZED, FOR SOLUTION INTRAVENOUS; PARENTERAL at 17:25

## 2020-12-08 RX ADMIN — SODIUM CHLORIDE SCH ML: 9 INJECTION INTRAMUSCULAR; INTRAVENOUS; SUBCUTANEOUS at 22:19

## 2020-12-08 RX ADMIN — AMOXICILLIN AND CLAVULANATE POTASSIUM SCH TAB: 875; 125 TABLET, FILM COATED ORAL at 22:18

## 2020-12-08 RX ADMIN — VANCOMYCIN HYDROCHLORIDE SCH MLS/HR: 1 INJECTION, POWDER, LYOPHILIZED, FOR SOLUTION INTRAVENOUS at 09:39

## 2020-12-08 RX ADMIN — Medication SCH UNIT: at 22:18

## 2020-12-08 RX ADMIN — INSULIN LISPRO SCH UNIT: 100 INJECTION, SOLUTION INTRAVENOUS; SUBCUTANEOUS at 22:19

## 2020-12-08 RX ADMIN — OXYCODONE AND ACETAMINOPHEN PRN TAB: 5; 325 TABLET ORAL at 00:20

## 2020-12-08 RX ADMIN — OXYCODONE AND ACETAMINOPHEN PRN TAB: 5; 325 TABLET ORAL at 09:38

## 2020-12-08 RX ADMIN — PIPERACILLIN AND TAZOBACTAM SCH: 3; .375 INJECTION, POWDER, LYOPHILIZED, FOR SOLUTION INTRAVENOUS; PARENTERAL at 11:56

## 2020-12-08 RX ADMIN — DOCUSATE SODIUM SCH MG: 100 CAPSULE, LIQUID FILLED ORAL at 17:25

## 2020-12-08 RX ADMIN — INSULIN LISPRO SCH UNIT: 100 INJECTION, SOLUTION INTRAVENOUS; SUBCUTANEOUS at 12:44

## 2020-12-08 RX ADMIN — SODIUM CHLORIDE PRN MLS/HR: 9 INJECTION, SOLUTION INTRAVENOUS at 09:43

## 2020-12-08 RX ADMIN — OXYCODONE AND ACETAMINOPHEN PRN TAB: 5; 325 TABLET ORAL at 22:28

## 2020-12-08 RX ADMIN — PIPERACILLIN AND TAZOBACTAM SCH MLS/HR: 3; .375 INJECTION, POWDER, LYOPHILIZED, FOR SOLUTION INTRAVENOUS; PARENTERAL at 12:45

## 2020-12-08 RX ADMIN — AMLODIPINE BESYLATE SCH MG: 10 TABLET ORAL at 09:39

## 2020-12-08 RX ADMIN — FERROUS SULFATE TAB 325 MG (65 MG ELEMENTAL FE) SCH MG: 325 (65 FE) TAB at 09:39

## 2020-12-08 RX ADMIN — DOCUSATE SODIUM SCH MG: 100 CAPSULE, LIQUID FILLED ORAL at 09:39

## 2020-12-08 RX ADMIN — LOSARTAN POTASSIUM SCH MG: 50 TABLET, FILM COATED ORAL at 09:39

## 2020-12-08 RX ADMIN — METFORMIN HYDROCHLORIDE SCH MG: 500 TABLET, FILM COATED ORAL at 09:39

## 2020-12-08 RX ADMIN — INSULIN HUMAN SCH UNIT: 100 INJECTION, SUSPENSION SUBCUTANEOUS at 17:34

## 2020-12-08 RX ADMIN — ENOXAPARIN SODIUM SCH MG: 40 INJECTION SUBCUTANEOUS at 09:45

## 2020-12-08 NOTE — RADIOLOGY REPORT (SQ)
EXAM DESCRIPTION:  PICC INSERTION



IMAGES COMPLETED DATE/TIME:  12/8/2020 12:02 pm



REASON FOR STUDY:  IV ABX THERAPY AT HOME



COMPARISON:  None.



FLUOROSCOPY TIME:  37 seconds of fluoroscopy was used.

1 images saved to PACS.



TECHNIQUE:  Fluoroscopic and ultrasound guided PICC placement.



LIMITATIONS:  None.



PROCEDURE:  After written consent and assessment were obtained, the patient was brought into the fluo
roscopy room and placed supine on the table. Ultrasound evaluation of potential access sites were per
formed. After successfully identifying a patent left basilic vein, the left arm was prepped and drape
d in a sterile fashion along with the ultrasound probe. The entry site was anesthetized with 1% lidoc
mayo. A 21 gauge 7 cm needle was advanced through the skin and into the basilic vein under live ultra
sound guidance.  An ultrasound image was saved to PACS confirming access site.  A .018 guide wire was
 then inserted through the needle and into the venous system. The needle was then removed and an 11 b
lade scalpel was used to make a 1cm skin incision.  A 5 fr peel-away sheath was advanced over the wir
e and into the venous system. A measurement was then made using the existing wire and live fluoroscop
ic guidance. The wire was then removed and trimmed. The PICC was advanced through the peel-away sheat
h and into the venous system. The peel-away sheath was removed and the catheter was adhered to the pa
tients arm with a stat lock. The catheter was then aspirated and flushed and a sterile bandage was pl
aced over the access site.  A fluoroscopic spot image was saved to PACS confirming the catheter tip w
ithin the superior vena cava.



IMPRESSION:  SUCCESSFUL PLACEMENT OF A 5 FR DUAL LUMEN 53  CM PICC IN THE LEFT BASILIC VEIN.



COMMENT:  Patient medication list reviewed: Yes- Quality ID# [id number]:Eligible professional attests to doc
umenting in the medical record they obtained, updated, or reviewed the patient's current medications.
.

Quality :  Final reports for procedures using fluoroscopy that document radiation exposure larissa
kaela, or exposure time and number of fluorographic images (if radiation exposure indices are not avail
able)

Quality ID #[id number]: The patient was prepped and draped using maximum sterile barrier technique including 
cap, mask, sterile gown, sterile gloves, a large sterile sheet, hand hygiene, and 2% Chlorhexidine fo
r cutaneous antisepsis. When ultrasound is used, sterile ultrasound techniques are followed requiring
 sterile gel and sterile probes.



TECHNICAL DOCUMENTATION:  JOB ID:  [id number]

 2011 [facility]- All Rights Reserved                           rev-5/18



Reading location - IP/workstation name: [id number]

## 2020-12-08 NOTE — PDOC PROGRESS REPORT
Subjective


Date:: 12/08/20


Subjective:: 





mild pains left foot operative site


Reason For Visit: 


CELLULITIS OF FOOT,TYPE 2 DIABETES MELLITUS WITH








Physical Exam


Vital Signs: 


                                        











Temp Pulse Resp BP Pulse Ox


 


 97.9 F   80   16   124/76   100 


 


 12/07/20 23:17  12/08/20 07:00  12/07/20 23:17  12/07/20 23:17  12/07/20 23:17








                                 Intake & Output











 12/07/20 12/08/20 12/09/20





 06:59 06:59 06:59


 


Intake Total 5051 1940 


 


Balance 5051 1940 


 


Weight 134.9 kg 155.4 kg 











Exam: 





Dressings changed wit wet to dry. Wound looks clean and swelling of foot less 

today.





Results


Laboratory Results: 


                                        





                                 12/08/20 04:27 





                                 12/08/20 04:27 





                                        











  12/08/20 12/08/20





  04:27 04:27


 


WBC   9.4


 


RBC   3.45 L


 


Hgb   8.0 L


 


Hct   24.7 L


 


MCV   72 L


 


MCH   23.2 L


 


MCHC   32.3


 


RDW   19.0 H


 


Plt Count   481 H


 


Seg Neutrophils %   69.5


 


Sodium  138.8 


 


Potassium  3.8 


 


Chloride  103 


 


Carbon Dioxide  28 


 


Anion Gap  8 


 


BUN  7 


 


Creatinine  0.87 


 


Est GFR (African Amer)  > 60 


 


Glucose  130 H 


 


Calcium  8.8 








                                        





12/03/20 15:10   Blood   Blood Culture (PCR) - Final








Impressions: 


                                        





Foot X-Ray  12/03/20 14:49


IMPRESSION:  Osteomyelitis in the 5th metatarsal.


 














Assessment & Plan





- Diagnosis


(1) Type 2 diabetes mellitus


Is this a current diagnosis for this admission?: Yes   





(2) Osteomyelitis left fifth metatarsal bone


Is this a current diagnosis for this admission?: Yes   





- Time


Anticipated Discharge Disposition: Home with Home Health


Anticipated Discharge Timeframe: within 48 hours


Critical Time spent with patient: 15-24 minutes





- Inpatient Certification


Medical Necessity: Need for IV Antibiotics





- Plan Summary


Plan Summary: 





POD 3 post amputation left 5th MT bone for osteomyelitis. B strep sensitive to 

Vanco.


Ordered Picc line for IV Vanco daily at home for 4-6 weeks


Arrange wound vac and follow up wound care center.


We will sign off. Call for questions

## 2020-12-09 RX ADMIN — METFORMIN HYDROCHLORIDE SCH MG: 500 TABLET, FILM COATED ORAL at 09:17

## 2020-12-09 RX ADMIN — INSULIN LISPRO SCH: 100 INJECTION, SOLUTION INTRAVENOUS; SUBCUTANEOUS at 11:53

## 2020-12-09 RX ADMIN — INSULIN HUMAN SCH UNIT: 100 INJECTION, SUSPENSION SUBCUTANEOUS at 09:16

## 2020-12-09 RX ADMIN — ENOXAPARIN SODIUM SCH MG: 40 INJECTION SUBCUTANEOUS at 09:16

## 2020-12-09 RX ADMIN — DOCUSATE SODIUM SCH MG: 100 CAPSULE, LIQUID FILLED ORAL at 17:42

## 2020-12-09 RX ADMIN — DOCUSATE SODIUM SCH MG: 100 CAPSULE, LIQUID FILLED ORAL at 09:17

## 2020-12-09 RX ADMIN — SODIUM CHLORIDE SCH ML: 9 INJECTION INTRAMUSCULAR; INTRAVENOUS; SUBCUTANEOUS at 09:17

## 2020-12-09 RX ADMIN — METFORMIN HYDROCHLORIDE SCH MG: 500 TABLET, FILM COATED ORAL at 17:42

## 2020-12-09 RX ADMIN — AMOXICILLIN AND CLAVULANATE POTASSIUM SCH TAB: 875; 125 TABLET, FILM COATED ORAL at 09:22

## 2020-12-09 RX ADMIN — AMOXICILLIN AND CLAVULANATE POTASSIUM SCH TAB: 875; 125 TABLET, FILM COATED ORAL at 21:30

## 2020-12-09 RX ADMIN — AMLODIPINE BESYLATE SCH MG: 10 TABLET ORAL at 09:19

## 2020-12-09 RX ADMIN — INSULIN LISPRO SCH: 100 INJECTION, SOLUTION INTRAVENOUS; SUBCUTANEOUS at 09:10

## 2020-12-09 RX ADMIN — PANTOPRAZOLE SODIUM SCH MG: 40 TABLET, DELAYED RELEASE ORAL at 06:41

## 2020-12-09 RX ADMIN — INSULIN LISPRO SCH UNIT: 100 INJECTION, SOLUTION INTRAVENOUS; SUBCUTANEOUS at 21:29

## 2020-12-09 RX ADMIN — INSULIN HUMAN SCH UNIT: 100 INJECTION, SUSPENSION SUBCUTANEOUS at 17:43

## 2020-12-09 RX ADMIN — OXYCODONE AND ACETAMINOPHEN PRN TAB: 5; 325 TABLET ORAL at 21:29

## 2020-12-09 RX ADMIN — LOSARTAN POTASSIUM SCH MG: 50 TABLET, FILM COATED ORAL at 09:16

## 2020-12-09 RX ADMIN — Medication SCH UNIT: at 09:17

## 2020-12-09 RX ADMIN — Medication SCH UNIT: at 21:30

## 2020-12-09 RX ADMIN — SODIUM CHLORIDE SCH ML: 9 INJECTION INTRAMUSCULAR; INTRAVENOUS; SUBCUTANEOUS at 21:30

## 2020-12-09 RX ADMIN — FERROUS SULFATE TAB 325 MG (65 MG ELEMENTAL FE) SCH MG: 325 (65 FE) TAB at 09:17

## 2020-12-09 RX ADMIN — INSULIN LISPRO SCH: 100 INJECTION, SOLUTION INTRAVENOUS; SUBCUTANEOUS at 21:28

## 2020-12-09 NOTE — PDOC PROGRESS REPORT
Subjective


Date:: 12/09/20


Subjective:: 


No chest pain or difficulty with breathing. No fever or chills. No nausea, 

vomiting, or abdominal pain.


Reason For Visit: 


CELLULITIS OF FOOT,TYPE 2 DIABETES MELLITUS WITH








Physical Exam


Vital Signs: 


                                        











Temp Pulse Resp BP Pulse Ox


 


 98.1 F   104 H  18   142/91 H  100 


 


 12/09/20 13:05  12/09/20 14:00  12/09/20 13:05  12/09/20 13:05  12/09/20 13:05








                                 Intake & Output











 12/08/20 12/09/20 12/10/20





 06:59 06:59 06:59


 


Intake Total 2940 1445 735


 


Balance 2940 1445 735


 


Weight 155.4 kg 156.7 kg 











Physical Exam: 





General appearance: PRESENT: morbidly obese


Head exam: PRESENT: atraumatic, normocephalic


Eye exam: PRESENT: conjunctiva pink.  ABSENT: pallor, scleral icterus


Mouth exam: PRESENT: moist


Respiratory exam: PRESENT: clear to auscultation kim


Cardiovascular exam: PRESENT: RRR, +S1, +S2.  ABSENT: diastolic murmur, rubs, 

systolic murmur


GI/Abdominal exam: PRESENT: normal bowel sounds, soft.  ABSENT: tenderness


Extremities exam: PRESENT: pedal edema - left foot and lower leg comparatively 

improving


Musculoskeletal exam: PRESENT: ambulatory - with walker


Neurological exam: PRESENT: alert, awake


Skin exam: PRESENT: dry, warm, other - left foot dressing is satisfactory.





Results


Laboratory Results: 


                                        





                                 12/08/20 04:27 





                                 12/08/20 04:27 





                                        





12/05/20 08:34   Foot - Abscess   Gram Stain - Final


12/05/20 08:34   Foot - Abscess   Wound Culture - Final


                            Group B Beta Streptococcus


                            Cutibacterium(Propion)Species


                            No Anaerobic Organisms


12/05/20 08:35   Foot - Abscess   Gram Stain - Final


12/05/20 08:35   Foot - Abscess   Wound Culture - Final


                            Group B Beta Streptococcus


                            Cutibacterium(Propion)Species


                            No Anaerobic Organisms


12/03/20 15:10   Blood   Blood Culture (PCR) - Final


12/03/20 15:10   Blood   Blood Culture - Final


                            Arcanobacter Haemolyticum


12/03/20 16:42   Blood   Blood Culture - Final


                            NO GROWTH IN 5 DAYS








Impressions: 


                                        





PICC Line Insertion  12/08/20 00:00


IMPRESSION:  SUCCESSFUL PLACEMENT OF A 5 FR DUAL LUMEN 53  CM PICC IN THE LEFT 

BASILIC VEIN.


 








Foot X-Ray  12/09/20 00:00


IMPRESSION:  INTERVAL AMPUTATION OF THE 5TH METATARSAL.  NO OTHER SIGNIFICANT 

BONY FINDINGS.


 














Assessment & Plan





- Diagnosis


(1) Osteomyelitis of foot, left, acute


Is this a current diagnosis for this admission?: Yes   





(2) Probable sepsis


Is this a current diagnosis for this admission?: Yes   





(3) Uncontrolled type 2 diabetes mellitus


Qualifiers: 


   Glycemic state: with hyperglycemia   Qualified Code(s): E11.65 - Type 2 

diabetes mellitus with hyperglycemia   


Is this a current diagnosis for this admission?: Yes   





(4) HTN (hypertension)


Qualifiers: 


   Hypertension type: essential hypertension   Qualified Code(s): I10 - 

Essential (primary) hypertension   


Is this a current diagnosis for this admission?: Yes   





(5) Morbid (severe) obesity due to excess calories


Is this a current diagnosis for this admission?: Yes   





(6) Morbid obesity with body mass index of 40.0-44.9 in adult


Is this a current diagnosis for this admission?: Yes   





(7) Anemia of chronic disease


Is this a current diagnosis for this admission?: Yes   





- Time


Time Spent with patient: 25-34 minutes


Level of Care: TELE


Medications reviewed and adjusted accordingly: Yes


Anticipated discharge: Home with Homehealth


Anticipated DC Timeframe: within 24 hours





- Inpatient Certification


Based on my medical assessment, after consideration of the patient's 

comorbidities, presenting symptoms, or acuity I expect that the services needed 

warrant INPATIENT care.: Yes


I certify that my determination is in accordance with my understanding of 

Medicare's requirements for reasonable and necessary INPATIENT services [42 CFR 

412.3e].: Yes


Medical Necessity: Significant Comorbidiites Make Outpatient Treatment Too 

Risky, Need Close Monitoring Due to Risk of Patient Decompensation, Need For IV 

Fluids, Need For Continuous Telemetry Monitoring, Risk of Complication if Not 

Cared For in Hospital, Risk of Diagnosis Which Will Require Inpatient 

Eval/Care/Monitoring


Post Hospital Care: D/C Planner Documentation





- Plan Summary


Plan Summary: 





Continue current medial management. Follow up on wound vac device provision.

## 2020-12-09 NOTE — RADIOLOGY REPORT (SQ)
EXAM DESCRIPTION:  FOOT LEFT 2 VIEWS



IMAGES COMPLETED DATE/TIME:  12/9/2020 11:11 am



REASON FOR STUDY:  Rule out Osteomyelitis



COMPARISON:  12/3/2020.



NUMBER OF VIEWS:  Three views.



TECHNIQUE:  AP, lateral and oblique without weight bearing radiographic images acquired of the left f
oot.



LIMITATIONS:  None.



FINDINGS:  MINERALIZATION: Normal.

BONES: Interval amputation of the 5th metatarsal.  No acute fracture or dislocation. No worrisome bon
e lesions. No significant osteophytes.

JOINTS: No erosions.  No earnest-articular osteopenia.  No chondrocalcinosis.

SOFT TISSUES: Postoperative changes.

OTHER: No other significant finding.



IMPRESSION:  INTERVAL AMPUTATION OF THE 5TH METATARSAL.  NO OTHER SIGNIFICANT BONY FINDINGS.



TECHNICAL DOCUMENTATION:  JOB ID:  [id number]

 2011 [employer]- All Rights Reserved



Reading location - IP/workstation name: [email or web address]

## 2020-12-09 NOTE — PDOC PROGRESS REPORT
Subjective


Date:: 12/09/20


Subjective:: 





feels ok


awaiting wound vac


Reason For Visit: 


CELLULITIS OF FOOT,TYPE 2 DIABETES MELLITUS WITH








Physical Exam


Vital Signs: 


                                        











Temp Pulse Resp BP Pulse Ox


 


 98.9 F   76   16   144/74 H  99 


 


 12/09/20 00:15  12/09/20 07:00  12/09/20 00:15  12/09/20 00:15  12/09/20 00:15








                                 Intake & Output











 12/08/20 12/09/20 12/10/20





 06:59 06:59 06:59


 


Intake Total 2940 1445 


 


Balance 2940 1445 


 


Weight 155.4 kg 156.7 kg 











General appearance: PRESENT: no acute distress


Head exam: PRESENT: normocephalic


Eye exam: PRESENT: EOMI


Ear exam: PRESENT: normal external ear exam


Mouth exam: PRESENT: moist


Neck exam: PRESENT: full ROM


Respiratory exam: PRESENT: clear to auscultation kim


Cardiovascular exam: PRESENT: RRR


Pulses: PRESENT: normal radial pulses, normal femoral pulses


Breast: PRESENT: Normal


GI/Abdominal exam: PRESENT: soft


Rectal exam: PRESENT: deferred


Musculoskeletal exam: PRESENT: other


Neurological exam: PRESENT: alert, awake, oriented to person, oriented to place


Psychiatric exam: PRESENT: appropriate affect


Skin exam: PRESENT: dry





Results


Laboratory Results: 


                                        





                                 12/08/20 04:27 





                                 12/08/20 04:27 





                                        





12/03/20 16:42   Blood   Blood Culture - Final


                            NO GROWTH IN 5 DAYS


12/03/20 15:10   Blood   Blood Culture (PCR) - Final








Impressions: 


                                        





Foot X-Ray  12/03/20 14:49


IMPRESSION:  Osteomyelitis in the 5th metatarsal.


 








PICC Line Insertion  12/08/20 00:00


IMPRESSION:  SUCCESSFUL PLACEMENT OF A 5 FR DUAL LUMEN 53  CM PICC IN THE LEFT 

BASILIC VEIN.


 














Assessment & Plan





- Time


Anticipated Discharge Disposition: Home, Self Care


Anticipated Discharge Timeframe: unk





- Plan Summary


Plan Summary: 





s/p left foot debridement for osteo


now on oral abx


awiting wound vac


can be dischared home when wound vac placed

## 2020-12-09 NOTE — PDOC PROGRESS REPORT
Subjective


Date:: 12/08/20


Subjective:: 


No fever or chills. No nausea, vomiting, or abdominal pain. No chest pain or 

difficulty with breathing.


Reason For Visit: 


CELLULITIS OF FOOT,TYPE 2 DIABETES MELLITUS WITH








Physical Exam


Vital Signs: 


                                        











Temp Pulse Resp BP Pulse Ox


 


 98.2 F   88   12   127/74 H  100 


 


 12/08/20 10:56  12/08/20 14:00  12/08/20 10:56  12/08/20 10:56  12/08/20 10:56








                                 Intake & Output











 12/07/20 12/08/20 12/09/20





 06:59 06:59 06:59


 


Intake Total 5051 2940 995


 


Balance 5051 2940 995


 


Weight 134.9 kg 155.4 kg 











Physical Exam: 





General appearance: PRESENT: morbidly obese


Head exam: PRESENT: atraumatic, normocephalic


Eye exam: PRESENT: conjunctiva pink.  ABSENT: scleral icterus


Mouth exam: PRESENT: moist


Respiratory exam: PRESENT: clear to auscultation kim


Cardiovascular exam: PRESENT: RRR, +S1, +S2.  ABSENT: diastolic murmur, rubs, 

systolic murmur


Vascular exam: ABSENT: pallor


GI/Abdominal exam: PRESENT: normal bowel sounds, soft.  ABSENT: tenderness


Extremities exam: PRESENT: pedal edema - left foot and lower leg comparatively 

improving


Musculoskeletal exam: PRESENT: ambulatory - with walker


Neurological exam: PRESENT: alert, awake


Skin exam: PRESENT: dry, warm, other - left foot dressing is satisfactory.








Results


Laboratory Results: 


                                        





                                 12/08/20 04:27 





                                 12/08/20 04:27 





                                        











  12/08/20 12/08/20





  04:27 04:27


 


WBC   9.4


 


RBC   3.45 L


 


Hgb   8.0 L


 


Hct   24.7 L


 


MCV   72 L


 


MCH   23.2 L


 


MCHC   32.3


 


RDW   19.0 H


 


Plt Count   481 H


 


Seg Neutrophils %   69.5


 


Sodium  138.8 


 


Potassium  3.8 


 


Chloride  103 


 


Carbon Dioxide  28 


 


Anion Gap  8 


 


BUN  7 


 


Creatinine  0.87 


 


Est GFR (African Amer)  > 60 


 


Glucose  130 H 


 


Calcium  8.8 








                                        





12/03/20 16:42   Blood   Blood Culture - Final


                            NO GROWTH IN 5 DAYS


12/03/20 15:10   Blood   Blood Culture (PCR) - Final








Impressions: 


                                        





Foot X-Ray  12/03/20 14:49


IMPRESSION:  Osteomyelitis in the 5th metatarsal.


 








PICC Line Insertion  12/08/20 00:00


IMPRESSION:  SUCCESSFUL PLACEMENT OF A 5 FR DUAL LUMEN 53  CM PICC IN THE LEFT 

BASILIC VEIN.


 














Assessment & Plan





- Diagnosis


(1) Osteomyelitis of foot, left, acute


Is this a current diagnosis for this admission?: Yes   





(2) Probable sepsis


Is this a current diagnosis for this admission?: Yes   





(3) Uncontrolled type 2 diabetes mellitus


Qualifiers: 


   Glycemic state: with hyperglycemia   Qualified Code(s): E11.65 - Type 2 

diabetes mellitus with hyperglycemia   


Is this a current diagnosis for this admission?: Yes   





(4) HTN (hypertension)


Qualifiers: 


   Hypertension type: essential hypertension   Qualified Code(s): I10 - Essent

ial (primary) hypertension   


Is this a current diagnosis for this admission?: Yes   





(5) Morbid (severe) obesity due to excess calories


Is this a current diagnosis for this admission?: Yes   





(6) Morbid obesity with body mass index of 40.0-44.9 in adult


Is this a current diagnosis for this admission?: Yes   





(7) Anemia of chronic disease


Is this a current diagnosis for this admission?: Yes   





- Time


Time Spent with patient: 25-34 minutes


Level of Care: TELE


Medications reviewed and adjusted accordingly: Yes


Anticipated DC Timeframe: within 24 hours





- Inpatient Certification


Based on my medical assessment, after consideration of the patient's 

comorbidities, presenting symptoms, or acuity I expect that the services needed 

warrant INPATIENT care.: Yes


I certify that my determination is in accordance with my understanding of 

Medicare's requirements for reasonable and necessary INPATIENT services [42 CFR 

412.3e].: Yes


Medical Necessity: Significant Comorbidiites Make Outpatient Treatment Too 

Risky, Need Close Monitoring Due to Risk of Patient Decompensation, Need For 

Continuous Telemetry Monitoring, Need for Pain Control, Need for IV Antibiotics,

Risk of Complication if Not Cared For in Hospital, Risk of Diagnosis Which Will 

Require Inpatient Eval/Care/Monitoring


Post Hospital Care: D/C Planner Documentation





- Plan Summary


Plan Summary: 


D/C IV antibiotic. Start on Augmentin 875/125 mg p.o bid. Maintain on all other 

current medication management.

## 2020-12-10 VITALS — DIASTOLIC BLOOD PRESSURE: 72 MMHG | SYSTOLIC BLOOD PRESSURE: 150 MMHG

## 2020-12-10 RX ADMIN — PANTOPRAZOLE SODIUM SCH MG: 40 TABLET, DELAYED RELEASE ORAL at 05:27

## 2020-12-10 RX ADMIN — INSULIN LISPRO SCH UNIT: 100 INJECTION, SOLUTION INTRAVENOUS; SUBCUTANEOUS at 07:53

## 2020-12-10 RX ADMIN — INSULIN HUMAN SCH UNIT: 100 INJECTION, SUSPENSION SUBCUTANEOUS at 07:52

## 2020-12-11 NOTE — PDOC DISCHARGE SUMMARY
Impression





- Admit/DC Date/PCP


Admission Date/Primary Care Provider: 


  12/03/20 17:24





  ERIKA CHILD





Discharge Date: 12/10/20





- Discharge Diagnosis


(1) Osteomyelitis of foot, left, acute


Is this a current diagnosis for this admission?: Yes   





(2) Probable sepsis


Is this a current diagnosis for this admission?: Yes   





(3) Uncontrolled type 2 diabetes mellitus


Is this a current diagnosis for this admission?: Yes   





(4) HTN (hypertension)


Is this a current diagnosis for this admission?: Yes   





(5) Morbid (severe) obesity due to excess calories


Is this a current diagnosis for this admission?: Yes   





(6) Morbid obesity with body mass index of 40.0-44.9 in adult


Is this a current diagnosis for this admission?: Yes   





(7) Anemia of chronic disease


Is this a current diagnosis for this admission?: Yes   





- Assessment


Summary: 


Patient presented with worsening pain and swelling involving her left foot and 

her initial evaluation in the ED was suggestive of left foot infection with 

osteomyelitis. She was seen in consultation by the surgicalist and advised 

hospitalization for further evaluation and management. She was taken to surgery 

on 12/05/2020 and had total amputation of her 5th metatarsal bone. her blood 

culture did not grow any significant bacterial. Her wound culture suggested soft

tissue infection. She was managed with IV Vancomycin and Zosyn but eventually 

changed to oral antibiotic for 14 days therapy since she has no residual bone 

infection. Her left foot X-ray revealed complete removal of her left 5th 

metatarsal bone. she will be discharged home today and follow up with the 

surgical team, wound care center and myself in the office as instructed upon 

discharge.





- Additional Information


Resuscitation Status: Full Code


Referrals: 


WOUND CARE [Outside] - 12/21/20 3:30 pm (FOLLOW UP IN 1-2 WEEKS)


BASHIR BEVERLY DPM [ACTIVE STAFF] - 12/22/20 11:15 am


ERIKA CHILD MD [Primary Care Provider] - 12/22/20 9:00 am


()


Prescriptions: 


Amoxicillin/Potassium Clav [Augmentin 875-125 Tablet] 1 tab PO Q12 #28 tablet


Home Medications: 








Metformin HCl 1,000 mg PO BID #60 tablet 03/07/19 


Amlodipine Besylate [Norvasc 10 mg Tablet] 10 mg PO DAILY 09/23/20 


Ferrous Sulfate [Feosol 325 mg Tablet] 325 mg PO DAILY 09/23/20 


Hydrochlorothiazide [Hydrodiuril 12.5 mg Tablet] 12.5 mg PO DAILY 09/23/20 


Insulin NPH Human Isophane [Novolin N Flexpen] 40 units SUBCUT QPM 09/23/20 


Insulin NPH Human Isophane [Novolin N Flexpen] 50 units SUBCUT QAM 09/23/20 


Insulin Regular, Human [Novolin R] 0 units SUBCUT .PERSLIDINGSCALE 09/23/20 


Irbesartan 300 mg PO DAILY 09/23/20 


Lactobacillus Acidophilus/Fos [Acidophilus Probiotic Tablet] 1 tab PO BID 

09/23/20 


Tramadol HCl [Ultram 50 mg Tablet] 50 mg PO Q6HP PRN #60 tablet 09/30/20 


Amoxicillin/Potassium Clav [Augmentin 875-125 Tablet] 1 tab PO Q12 #28 tablet 

12/10/20 











History of Present Illiness


History of Present Illness: 


CHEPE SCHMITT is a 35 year old female known to my practice who was referred to 

the ED for further evaluation due to worsening pain in her left foot. Patient 

reported that she had episode of fever 3 days prior to her presentation and 

developed pain in her left foot that worsen over last 2 days. She denied any 

instrumentation, trauma, or injury preceding her symptoms onset. She reported 

compliance with her left foot post 5th toe amputation dressing at home. Her home

blood glucose level have been satisfactory until 3 days ago when it has remained

above 200 mg/dL. She reported compliance with her medication and dietary 

restrictions. She denied any nausea, vomiting, abdominal pain, or diarrhea. No 

chest pain or difficulty with her breathing. Her initial ED evaluation was 

significant for worsening left foot open wound, leukocytosis and left foot X ray

suggestive of osteomyelitis involving the 5th metatarsal bone. Her morbidities 

are as listed below. She was advised hospitalization for further evaluation and 

management. She will need surgical consultation for possible intervention.


 





Hospital Course


Hospital Course: 


Patient presented with worsening pain and swelling involving her left foot and 

her initial evaluation in the ED was suggestive of left foot infection with 

osteomyelitis. She was seen in consultation by the surgicalist and advised 

hospitalization for further evaluation and management. She was taken to surgery 

on 12/05/2020 and had total amputation of her 5th metatarsal bone. her blood 

culture did not grow any significant bacterial. Her wound culture suggested soft

tissue infection. She was managed with IV Vancomycin and Zosyn but eventually 

changed to oral antibiotic for 14 days therapy since she has no residual bone 

infection. Her left foot X-ray revealed complete removal of her left 5th 

metatarsal bone. she will be discharged home today and follow up with the 

surgical team, wound care center and myself in the office as instructed upon 

discharge.





Physical Exam


Vital Signs: 


                                        











Temp Pulse Resp BP Pulse Ox


 


 98.1 F   77   18   144/84 H  100 


 


 12/10/20 08:34  12/10/20 08:34  12/10/20 08:34  12/10/20 08:34  12/10/20 08:34








                                 Intake & Output











 12/09/20 12/10/20 12/11/20





 06:59 06:59 06:59


 


Intake Total 1445 1235 800


 


Balance 1445 1235 800


 


Weight 156.7 kg 156 kg 




















General appearance: PRESENT: morbidly obese


Head exam: PRESENT: atraumatic, normocephalic


Eye exam: PRESENT: conjunctiva pink.  ABSENT: pallor, sclera icterus


Mouth exam: PRESENT: moist


Respiratory exam: PRESENT: clear to auscultation kim


Cardiovascular exam: PRESENT: RRR, +S1, +S2.  ABSENT: diastolic murmur, rubs, 

systolic murmur


GI/Abdominal exam: PRESENT: normal bowel sounds, soft.  ABSENT: tenderness


Extremities exam: PRESENT: pedal edema - left foot and lower leg comparatively 

improving


Musculoskeletal exam: PRESENT: ambulatory - with walker


Neurological exam: PRESENT: alert, awake


Skin exam: PRESENT: dry, warm, other - left foot dressing is satisfactory.














Results


Laboratory Results: 


                                        











WBC  9.4 10^3/uL (4.0-10.5)   12/08/20  04:27    


 


RBC  3.45 10^6/uL (3.72-5.28)  L  12/08/20  04:27    


 


Hgb  8.0 g/dL (12.0-15.5)  L  12/08/20  04:27    


 


Hct  24.7 % (36.0-47.0)  L  12/08/20  04:27    


 


MCV  72 fl (80-97)  L  12/08/20  04:27    


 


MCH  23.2 pg (27.0-33.4)  L  12/08/20  04:27    


 


MCHC  32.3 g/dL (32.0-36.0)   12/08/20  04:27    


 


RDW  19.0 % (11.5-14.0)  H  12/08/20  04:27    


 


Plt Count  481 10^3/uL (150-450)  H  12/08/20  04:27    


 


Lymph % (Auto)  17.7 % (13-45)   12/08/20  04:27    


 


Mono % (Auto)  8.0 % (3-13)   12/08/20  04:27    


 


Eos % (Auto)  4.2 % (0-6)   12/08/20  04:27    


 


Baso % (Auto)  0.6 % (0-2)   12/08/20  04:27    


 


Absolute Neuts (auto)  6.5 10^3/uL (1.7-8.2)   12/08/20  04:27    


 


Absolute Lymphs (auto)  1.7 10^3/uL (0.5-4.7)   12/08/20  04:27    


 


Absolute Monos (auto)  0.7 10^3/uL (0.1-1.4)   12/08/20  04:27    


 


Absolute Eos (auto)  0.4 10^3/uL (0.0-0.6)   12/08/20  04:27    


 


Absolute Basos (auto)  0.1 10^3/uL (0.0-0.2)   12/08/20  04:27    


 


Seg Neutrophils %  69.5 % (42-78)   12/08/20  04:27    


 


Sodium  138.8 mmol/L (137-145)   12/08/20  04:27    


 


Potassium  3.8 mmol/L (3.6-5.0)   12/08/20  04:27    


 


Chloride  103 mmol/L ()   12/08/20  04:27    


 


Carbon Dioxide  28 mmol/L (22-30)   12/08/20  04:27    


 


Anion Gap  8  (5-19)   12/08/20  04:27    


 


BUN  7 mg/dL (7-20)   12/08/20  04:27    


 


Creatinine  0.87 mg/dL (0.52-1.25)   12/08/20  04:27    


 


Est GFR ( Amer)  > 60  (>60)   12/08/20  04:27    


 


Est GFR (MDRD) Non-Af  > 60  (>60)   12/08/20  04:27    


 


Glucose  130 mg/dL ()  H  12/08/20  04:27    


 


POC Glucose  156 mg/dL ()  H  12/10/20  06:10    


 


Hemoglobin A1c %  8.4 % (4.7-6.0)  H  12/04/20  06:12    


 


Lactic Acid  2.3 mmol/L (0.7-2.1)  H  12/03/20  15:10    


 


Calcium  8.8 mg/dL (8.4-10.2)   12/08/20  04:27    


 


Total Bilirubin  0.6 mg/dL (0.2-1.3)   12/04/20  06:12    


 


Direct Bilirubin  0.2 mg/dL (0.0-0.4)   12/04/20  06:12    


 


Neonat Total Bilirubin  Not Reportable   12/04/20  06:12    


 


Neonat Direct Bilirubin  Not Reportable   12/04/20  06:12    


 


Neonat Indirect Bili  Not Reportable   12/04/20  06:12    


 


AST  11 U/L (14-36)  L  12/04/20  06:12    


 


ALT  7 U/L (<35)   12/04/20  06:12    


 


Alkaline Phosphatase  73 U/L ()   12/04/20  06:12    


 


C-Reactive Protein  252.6 mg/L (<10.0)  H  12/03/20  15:10    


 


Total Protein  7.0 g/dL (6.3-8.2)   12/04/20  06:12    


 


Albumin  2.9 g/dL (3.5-5.0)  L  12/04/20  06:12    


 


Triglycerides  122 mg/dL (<150)   12/04/20  06:12    


 


Cholesterol  108.73 mg/dL (0-200)   12/04/20  06:12    


 


LDL Cholesterol Direct  55 mg/dL (<100)   12/04/20  06:12    


 


VLDL Cholesterol  24.0 mg/dL (10-31)   12/04/20  06:12    


 


HDL Cholesterol  22 mg/dL (>40)  L  12/04/20  06:12    


 


Time Trough Drawn  2029 12/06/20  20:29    


 


Vancomycin Trough  9.1 ug/mL (5.0-20.0)   12/06/20  20:29    


 


Influenza A (RT-PCR)  NEGATIVE  (NEGATIVE)   12/04/20  19:27    


 


Influenza B (RT-PCR)  NEGATIVE  (NEGATIVE)   12/04/20  19:27    


 


RSV (RT-PCR)  NEGATIVE  (NEGATIVE)   12/04/20  19:27    


 


SARS-CoV-2 Rap RNA(RT-PCR)  NEGATIVE  (NEGATIVE)   12/04/20  19:27    


 


Blood Type  B POSITIVE   12/06/20  09:55    


 


Blood Type Confirm  B POSITIVE   12/06/20  11:17    


 


Antibody Screen  NEGATIVE   12/06/20  09:55    


 


Crossmatch  See Detail   12/06/20  09:55    











Impressions: 


                                        





Foot X-Ray  12/03/20 14:49


IMPRESSION:  Osteomyelitis in the 5th metatarsal.


 








PICC Line Insertion  12/08/20 00:00


IMPRESSION:  SUCCESSFUL PLACEMENT OF A 5 FR DUAL LUMEN 53  CM PICC IN THE LEFT 

BASILIC VEIN.


 








Foot X-Ray  12/09/20 00:00


IMPRESSION:  INTERVAL AMPUTATION OF THE 5TH METATARSAL.  NO OTHER SIGNIFICANT 

BONY FINDINGS.


 














Plan


Health Concerns: 


High readmission risk. Compliance with medication, dietary restrictions, and 

wound management.


Plan of Treatment: 


Maintain on oral antibiotic therapy x 14 days., A service to provide visiting 

nurse to monitor wound management on wound vac therapy. Follow up with surgical 

team and wound care center as instructed upon discharge.


Goals: 


Reduce readmission risk, improve glycemic control and wound healing prospect.


Time Spent: Greater than 30 Minutes - I had extensive discussion with patient 

during this hospitalization regarding her high risk of complication from uncon

trolled diabetes mellitus and future amputation if she continue on current trend

of noncompliabnce with her care plan.





Stroke


Is this a Stroke Patient?: No





Acute Heart Failure


Is this a Heart Failure Patient?: No

## 2020-12-15 ENCOUNTER — HOSPITAL ENCOUNTER (OUTPATIENT)
Dept: HOSPITAL 62 - RAD | Age: 35
End: 2020-12-15
Attending: PODIATRIST
Payer: COMMERCIAL

## 2020-12-15 DIAGNOSIS — J93.9: Primary | ICD-10-CM

## 2020-12-15 PROCEDURE — 71046 X-RAY EXAM CHEST 2 VIEWS: CPT

## 2020-12-15 NOTE — RADIOLOGY REPORT (SQ)
EXAM DESCRIPTION:  CHEST 2 VIEWS



IMAGES COMPLETED DATE/TIME:  12/15/2020 12:24 pm



REASON FOR STUDY:  (J93.9)PNEUMOTHORAX, UNSPECIFIED



COMPARISON:  None.



EXAM PARAMETERS:  NUMBER OF VIEWS: two views

TECHNIQUE: Digital Frontal and Lateral radiographic views of the chest acquired.

RADIATION DOSE: NA

LIMITATIONS: none



FINDINGS:  LUNGS AND PLEURA: No opacities, masses or pneumothorax. No pleural effusion.

MEDIASTINUM AND HILAR STRUCTURES: No masses or contour abnormalities.

HEART AND VASCULAR STRUCTURES: Heart normal size.  No evidence for failure.

BONES: No acute findings.

HARDWARE: None in the chest.

OTHER: No other significant finding.



IMPRESSION:  NO ACUTE RADIOGRAPHIC FINDING IN THE CHEST.



TECHNICAL DOCUMENTATION:  JOB ID:  [id number]

 2011 [employer]- All Rights Reserved



Reading location - IP/workstation name: [email or web address]

## 2020-12-17 NOTE — PROGRESS NOTE
Provider Note


Provider Note: 





Please see my discharge and progress note diagnoses including osteomyelitis of 

left foot ( that is bone involvement with infection)

## 2021-01-14 ENCOUNTER — HOSPITAL ENCOUNTER (OUTPATIENT)
Dept: HOSPITAL 62 - RAD | Age: 36
End: 2021-01-14
Attending: PODIATRIST
Payer: COMMERCIAL

## 2021-01-14 DIAGNOSIS — L97.522: ICD-10-CM

## 2021-01-14 DIAGNOSIS — E11.621: Primary | ICD-10-CM

## 2021-01-14 LAB
ADD MANUAL DIFF: NO
ALBUMIN SERPL-MCNC: 3.9 G/DL (ref 3.5–5)
ALP SERPL-CCNC: 88 U/L (ref 38–126)
ANION GAP SERPL CALC-SCNC: 6 MMOL/L (ref 5–19)
AST SERPL-CCNC: 14 U/L (ref 14–36)
BASOPHILS # BLD AUTO: 0 10^3/UL (ref 0–0.2)
BASOPHILS NFR BLD AUTO: 0.5 % (ref 0–2)
BILIRUB DIRECT SERPL-MCNC: 0.2 MG/DL (ref 0–0.4)
BILIRUB SERPL-MCNC: 0.3 MG/DL (ref 0.2–1.3)
BUN SERPL-MCNC: 13 MG/DL (ref 7–20)
CALCIUM: 9.8 MG/DL (ref 8.4–10.2)
CHLORIDE SERPL-SCNC: 101 MMOL/L (ref 98–107)
CO2 SERPL-SCNC: 28 MMOL/L (ref 22–30)
CRP SERPL-MCNC: 15.3 MG/L (ref ?–10)
EOSINOPHIL # BLD AUTO: 0.1 10^3/UL (ref 0–0.6)
EOSINOPHIL NFR BLD AUTO: 1.2 % (ref 0–6)
ERYTHROCYTE [DISTWIDTH] IN BLOOD BY AUTOMATED COUNT: 20.3 % (ref 11.5–14)
ERYTHROCYTE [SEDIMENTATION RATE] IN BLOOD: 64 MM/HR (ref 0–20)
GLUCOSE SERPL-MCNC: 129 MG/DL (ref 75–110)
HCT VFR BLD CALC: 31.5 % (ref 36–47)
HGB BLD-MCNC: 10.8 G/DL (ref 12–15.5)
LYMPHOCYTES # BLD AUTO: 2.7 10^3/UL (ref 0.5–4.7)
LYMPHOCYTES NFR BLD AUTO: 31 % (ref 13–45)
MCH RBC QN AUTO: 24 PG (ref 27–33.4)
MCHC RBC AUTO-ENTMCNC: 34.4 G/DL (ref 32–36)
MCV RBC AUTO: 70 FL (ref 80–97)
MONOCYTES # BLD AUTO: 0.6 10^3/UL (ref 0.1–1.4)
MONOCYTES NFR BLD AUTO: 6.6 % (ref 3–13)
NEUTROPHILS # BLD AUTO: 5.2 10^3/UL (ref 1.7–8.2)
NEUTS SEG NFR BLD AUTO: 60.7 % (ref 42–78)
PLATELET # BLD: 415 10^3/UL (ref 150–450)
POTASSIUM SERPL-SCNC: 4.2 MMOL/L (ref 3.6–5)
PROT SERPL-MCNC: 8.4 G/DL (ref 6.3–8.2)
RBC # BLD AUTO: 4.52 10^6/UL (ref 3.72–5.28)
TOTAL CELLS COUNTED % (AUTO): 100 %
WBC # BLD AUTO: 8.6 10^3/UL (ref 4–10.5)

## 2021-01-14 PROCEDURE — 85652 RBC SED RATE AUTOMATED: CPT

## 2021-01-14 PROCEDURE — 85025 COMPLETE CBC W/AUTO DIFF WBC: CPT

## 2021-01-14 PROCEDURE — 36415 COLL VENOUS BLD VENIPUNCTURE: CPT

## 2021-01-14 PROCEDURE — 86140 C-REACTIVE PROTEIN: CPT

## 2021-01-14 PROCEDURE — 80053 COMPREHEN METABOLIC PANEL: CPT

## 2021-01-14 NOTE — RADIOLOGY REPORT (SQ)
EXAM DESCRIPTION:  FOOT LEFT COMPLETE



IMAGES COMPLETED DATE/TIME:  1/14/2021 2:55 pm



REASON FOR STUDY:  (L97.522)NON-PRS CHRONIC ULCER OTH PRT LEFT FOOT W FAT LAYER EXPOSED L97.522  NON-
PRS CHRONIC ULCER OTH PRT LEFT FOOT W FAT LAYER  E11.621  TYPE 2 DIABETES MELLITUS WITH FOOT ULCER



COMPARISON:  12/9/2020



NUMBER OF VIEWS:  Three views.



TECHNIQUE:  AP, lateral and oblique  radiographic images acquired of the left foot.



LIMITATIONS:  None.



FINDINGS:  Interval development of periosteal reaction along the lateral surface of the proximal 4th 
metatarsal status post amputation of the 5th metatarsal.  Dystrophic calcifications in the more later
al soft tissues.  Plantar swelling.  No foreign body.



IMPRESSION:  Periosteal reaction for which cannot exclude osteomyelitis.



TECHNICAL DOCUMENTATION:  JOB ID:  [id number]

 2011 [employer]- All Rights Reserved



Reading location - IP/workstation name: [email or web address]